# Patient Record
Sex: MALE | Race: WHITE | NOT HISPANIC OR LATINO | Employment: OTHER | URBAN - METROPOLITAN AREA
[De-identification: names, ages, dates, MRNs, and addresses within clinical notes are randomized per-mention and may not be internally consistent; named-entity substitution may affect disease eponyms.]

---

## 2017-12-08 ENCOUNTER — GENERIC CONVERSION - ENCOUNTER (OUTPATIENT)
Dept: OTHER | Facility: OTHER | Age: 82
End: 2017-12-08

## 2017-12-08 ENCOUNTER — APPOINTMENT (OUTPATIENT)
Dept: RADIOLOGY | Facility: MEDICAL CENTER | Age: 82
End: 2017-12-08
Payer: COMMERCIAL

## 2017-12-08 DIAGNOSIS — M17.12 PRIMARY OSTEOARTHRITIS OF LEFT KNEE: ICD-10-CM

## 2017-12-08 PROCEDURE — 73562 X-RAY EXAM OF KNEE 3: CPT

## 2018-01-24 VITALS
HEART RATE: 97 BPM | WEIGHT: 285 LBS | DIASTOLIC BLOOD PRESSURE: 84 MMHG | BODY MASS INDEX: 40.8 KG/M2 | HEIGHT: 70 IN | SYSTOLIC BLOOD PRESSURE: 155 MMHG

## 2018-05-04 ENCOUNTER — OFFICE VISIT (OUTPATIENT)
Dept: OBGYN CLINIC | Facility: MEDICAL CENTER | Age: 83
End: 2018-05-04
Payer: COMMERCIAL

## 2018-05-04 VITALS
DIASTOLIC BLOOD PRESSURE: 83 MMHG | WEIGHT: 284.39 LBS | HEIGHT: 69 IN | HEART RATE: 73 BPM | SYSTOLIC BLOOD PRESSURE: 155 MMHG | BODY MASS INDEX: 42.12 KG/M2

## 2018-05-04 DIAGNOSIS — G89.29 CHRONIC PAIN OF LEFT KNEE: ICD-10-CM

## 2018-05-04 DIAGNOSIS — M17.12 PRIMARY OSTEOARTHRITIS OF LEFT KNEE: Primary | ICD-10-CM

## 2018-05-04 DIAGNOSIS — M25.562 CHRONIC PAIN OF LEFT KNEE: ICD-10-CM

## 2018-05-04 PROCEDURE — 99213 OFFICE O/P EST LOW 20 MIN: CPT | Performed by: ORTHOPAEDIC SURGERY

## 2018-05-04 RX ORDER — ACETAMINOPHEN 325 MG/1
975 TABLET ORAL ONCE
Status: CANCELLED | OUTPATIENT
Start: 2018-05-04 | End: 2018-05-04

## 2018-05-04 RX ORDER — LISINOPRIL AND HYDROCHLOROTHIAZIDE 20; 12.5 MG/1; MG/1
TABLET ORAL
COMMUNITY
Start: 2018-03-23 | End: 2018-05-17

## 2018-05-04 RX ORDER — GABAPENTIN 300 MG/1
300 CAPSULE ORAL ONCE
Status: CANCELLED | OUTPATIENT
Start: 2018-05-04 | End: 2018-05-04

## 2018-05-04 RX ORDER — ASPIRIN 81 MG/1
TABLET, CHEWABLE ORAL DAILY
COMMUNITY

## 2018-05-04 RX ORDER — AMLODIPINE BESYLATE 5 MG/1
5 TABLET ORAL DAILY
COMMUNITY
End: 2018-05-17

## 2018-05-04 NOTE — PROGRESS NOTES
Assessment/Plan:    No problem-specific Assessment & Plan notes found for this encounter  Diagnoses and all orders for this visit:    Primary osteoarthritis of left knee    Chronic pain of left knee    Other orders  -     aspirin 81 mg chewable tablet; Chew  -     amLODIPine (NORVASC) 5 mg tablet; Take by mouth  -     lisinopril-hydrochlorothiazide (PRINZIDE,ZESTORETIC) 20-12 5 MG per tablet;         Plan:    He patient is an 59-year-old male that presents today with left knee pain when walking for a significant distance  He previously had an injection into the left knee which were provided some relief  He states he continues to have pain walking long distances  It was discussed at length the different treatment options and he would like to proceed with a left total knee arthroplasty  Risks and benefits to surgery were discussed with the patient that include but not limited to infection, increased pain, need for revision surgery, damage to blood vessels, damage to nerves, anesthesia risks  The patient understands risks and wishes to proceed all questions were answered to his satisfaction will get this scheduled for him at his  Convenience  Subjective:      Patient ID: Courtney Kunz is a [de-identified] y o  male  HPI  Patient is a 29-year-old male that presents to discuss total knee arthroplasty  He has tried cortisone injections with little relief  He states it affects his activities of daily living to a significant degree      The following portions of the patient's history were reviewed and updated as appropriate: allergies, current medications, past family history, past medical history, past social history, past surgical history and problem list     Review of Systems    ROS:   General: no fever, no chills  HEENT:  No loss of hearing or eyesight problems  Eyes:  No red eyes  Respiratory:  No coughing, shortness of breath or wheezing  Cardiovascular:  No chest pain, no palpitations  GI:  Abdomen soft nontender, denies nausea  Endocrine:  No muscle weakness, no frequent urination, no excessive thirst  Urinary:  No dysuria, no incontinence  Musculoskeletal: see HPI and PE  SKIN:  No skin rash, no dry skin  Neurological:  No headaches, no confusion  Psychiatric:  No suicide thoughts, no anxiety, no depression  Review of all other systems is negative  Objective:      /83   Pulse 73   Ht 5' 9" (1 753 m)   Wt 129 kg (284 lb 6 3 oz)   BMI 42 00 kg/m²          Physical Exam      Left knee examination shows skin intact with no evidence of erythema or ecchymosis noted  Range of motion is 0-115 degrees with mild effusion  tenderness noted with palpation over the medial joint line and over anterior knee  Knee is ligamentous stable to varus and valgus stress  Negative Lachman  Negative anterior and posterior drawer  Sensation intact to light touch L1-S1 distributions  5/5 strength with knee flexion extension, ankle flexion extension and flexion extension of all toes

## 2018-05-17 ENCOUNTER — HOSPITAL ENCOUNTER (OUTPATIENT)
Dept: RADIOLOGY | Facility: HOSPITAL | Age: 83
Discharge: HOME | End: 2018-05-17

## 2018-05-17 ENCOUNTER — OFFICE VISIT (OUTPATIENT)
Dept: LAB | Facility: HOSPITAL | Age: 83
End: 2018-05-17
Attending: ORTHOPAEDIC SURGERY
Payer: COMMERCIAL

## 2018-05-17 DIAGNOSIS — M17.12 PRIMARY OSTEOARTHRITIS OF LEFT KNEE: ICD-10-CM

## 2018-05-17 DIAGNOSIS — M25.562 CHRONIC PAIN OF LEFT KNEE: ICD-10-CM

## 2018-05-17 DIAGNOSIS — G89.29 CHRONIC PAIN OF LEFT KNEE: ICD-10-CM

## 2018-05-17 LAB
ABO GROUP BLD: NORMAL
ALBUMIN SERPL BCP-MCNC: 4.3 G/DL (ref 3.5–5)
ALP SERPL-CCNC: 74 U/L (ref 46–116)
ALT SERPL W P-5'-P-CCNC: 33 U/L (ref 12–78)
ANION GAP SERPL CALCULATED.3IONS-SCNC: 7 MMOL/L (ref 4–13)
APTT PPP: 28 SECONDS (ref 24–36)
AST SERPL W P-5'-P-CCNC: 21 U/L (ref 5–45)
ATRIAL RATE: 83 BPM
BASOPHILS # BLD AUTO: 0.04 THOUSANDS/ΜL (ref 0–0.1)
BASOPHILS NFR BLD AUTO: 1 % (ref 0–1)
BILIRUB SERPL-MCNC: 1.04 MG/DL (ref 0.2–1)
BLD GP AB SCN SERPL QL: NEGATIVE
BUN SERPL-MCNC: 22 MG/DL (ref 5–25)
CALCIUM SERPL-MCNC: 9.5 MG/DL (ref 8.3–10.1)
CHLORIDE SERPL-SCNC: 103 MMOL/L (ref 100–108)
CO2 SERPL-SCNC: 29 MMOL/L (ref 21–32)
CREAT SERPL-MCNC: 1.28 MG/DL (ref 0.6–1.3)
EOSINOPHIL # BLD AUTO: 0.23 THOUSAND/ΜL (ref 0–0.61)
EOSINOPHIL NFR BLD AUTO: 4 % (ref 0–6)
ERYTHROCYTE [DISTWIDTH] IN BLOOD BY AUTOMATED COUNT: 12.8 % (ref 11.6–15.1)
EST. AVERAGE GLUCOSE BLD GHB EST-MCNC: 123 MG/DL
GFR SERPL CREATININE-BSD FRML MDRD: 53 ML/MIN/1.73SQ M
GLUCOSE SERPL-MCNC: 92 MG/DL (ref 65–140)
HBA1C MFR BLD: 5.9 % (ref 4.2–6.3)
HCT VFR BLD AUTO: 46.6 % (ref 36.5–49.3)
HGB BLD-MCNC: 15.4 G/DL (ref 12–17)
IMM GRANULOCYTES # BLD AUTO: 0.01 THOUSAND/UL (ref 0–0.2)
IMM GRANULOCYTES NFR BLD AUTO: 0 % (ref 0–2)
INR PPP: 0.94 (ref 0.86–1.09)
LYMPHOCYTES # BLD AUTO: 1.74 THOUSANDS/ΜL (ref 0.6–4.47)
LYMPHOCYTES NFR BLD AUTO: 30 % (ref 14–44)
MCH RBC QN AUTO: 33 PG (ref 26.8–34.3)
MCHC RBC AUTO-ENTMCNC: 33 G/DL (ref 31.4–37.4)
MCV RBC AUTO: 100 FL (ref 82–98)
MONOCYTES # BLD AUTO: 0.34 THOUSAND/ΜL (ref 0.17–1.22)
MONOCYTES NFR BLD AUTO: 6 % (ref 4–12)
NEUTROPHILS # BLD AUTO: 3.48 THOUSANDS/ΜL (ref 1.85–7.62)
NEUTS SEG NFR BLD AUTO: 60 % (ref 43–75)
NRBC BLD AUTO-RTO: 0 /100 WBCS
P AXIS: 95 DEGREES
PLATELET # BLD AUTO: 198 THOUSANDS/UL (ref 149–390)
PMV BLD AUTO: 9.8 FL (ref 8.9–12.7)
POTASSIUM SERPL-SCNC: 4.5 MMOL/L (ref 3.5–5.3)
PR INTERVAL: 196 MS
PROT SERPL-MCNC: 7.8 G/DL (ref 6.4–8.2)
PROTHROMBIN TIME: 12.7 SECONDS (ref 11.8–14.2)
QRS AXIS: -24 DEGREES
QRSD INTERVAL: 90 MS
QT INTERVAL: 394 MS
QTC INTERVAL: 462 MS
RBC # BLD AUTO: 4.67 MILLION/UL (ref 3.88–5.62)
RH BLD: POSITIVE
SODIUM SERPL-SCNC: 139 MMOL/L (ref 136–145)
SPECIMEN EXPIRATION DATE: NORMAL
T WAVE AXIS: 34 DEGREES
VENTRICULAR RATE: 83 BPM
WBC # BLD AUTO: 5.84 THOUSAND/UL (ref 4.31–10.16)

## 2018-05-17 PROCEDURE — 71046 X-RAY EXAM CHEST 2 VIEWS: CPT

## 2018-05-17 PROCEDURE — 36415 COLL VENOUS BLD VENIPUNCTURE: CPT

## 2018-05-17 PROCEDURE — 85610 PROTHROMBIN TIME: CPT

## 2018-05-17 PROCEDURE — 86901 BLOOD TYPING SEROLOGIC RH(D): CPT | Performed by: ORTHOPAEDIC SURGERY

## 2018-05-17 PROCEDURE — 85730 THROMBOPLASTIN TIME PARTIAL: CPT

## 2018-05-17 PROCEDURE — 93005 ELECTROCARDIOGRAM TRACING: CPT

## 2018-05-17 PROCEDURE — 86900 BLOOD TYPING SEROLOGIC ABO: CPT | Performed by: ORTHOPAEDIC SURGERY

## 2018-05-17 PROCEDURE — 80053 COMPREHEN METABOLIC PANEL: CPT

## 2018-05-17 PROCEDURE — 93010 ELECTROCARDIOGRAM REPORT: CPT | Performed by: INTERNAL MEDICINE

## 2018-05-17 PROCEDURE — 86850 RBC ANTIBODY SCREEN: CPT | Performed by: ORTHOPAEDIC SURGERY

## 2018-05-17 PROCEDURE — 83036 HEMOGLOBIN GLYCOSYLATED A1C: CPT

## 2018-05-17 PROCEDURE — 85025 COMPLETE CBC W/AUTO DIFF WBC: CPT

## 2018-05-17 RX ORDER — ASCORBIC ACID 500 MG
500 TABLET ORAL DAILY
COMMUNITY
End: 2018-06-16 | Stop reason: HOSPADM

## 2018-05-17 RX ORDER — FERROUS SULFATE 325(65) MG
325 TABLET ORAL
COMMUNITY
End: 2018-06-16 | Stop reason: HOSPADM

## 2018-05-17 RX ORDER — LISINOPRIL AND HYDROCHLOROTHIAZIDE 25; 20 MG/1; MG/1
1 TABLET ORAL DAILY
COMMUNITY

## 2018-05-17 RX ORDER — FOLIC ACID 1 MG/1
TABLET ORAL DAILY
COMMUNITY
End: 2018-06-16 | Stop reason: HOSPADM

## 2018-05-17 NOTE — PRE-PROCEDURE INSTRUCTIONS
Pre-Surgery Instructions:   Medication Instructions    ascorbic acid (VITAMIN C) 500 mg tablet Patient was instructed by Physician and understands   aspirin 81 mg chewable tablet Instructed patient per Anesthesia Guidelines   Bimatoprost (LUMIGAN OP) Instructed patient per Anesthesia Guidelines   ferrous sulfate 325 (65 Fe) mg tablet Patient was instructed by Physician and understands   folic acid (FOLVITE) 1 mg tablet Patient was instructed by Physician and understands   lisinopril-hydrochlorothiazide (PRINZIDE,ZESTORETIC) 20-25 MG per tablet Instructed patient per Anesthesia Guidelines  Patient had PAT appt, wife present  Medication list reviewed & instructed  As of 6 7 18 pt to stop aspirin  Instructed on tylenol only  Last dose of lisinopril-hctz 6 12 18  Pt has script & instructed to start vit C, iron, folic acid week of 4 30 81 and continue taking until 6 13 18  Am DOS no medications  Showering instructions & cloths given by office, reviewed at time of call  Soap and incentive given to patient  All instructed and verbally understood by patient and patient wife  Callback number given  ACE/ARB Med Class     Do not take this medication the day before and the morning of the day of surgery/procedure  ASA Med Class: Aspirin     Should be discontinued at least one week prior to planned operation, unless specifically stated otherwise by surgical service  Your Surgeon may have patient stop taking aspirin up to a week before surgery if having intracranial, middle ear, posterior eye, spine surgery or prostate surgery  [Patients taking aspirin for coronary stents should be reviewed by an anesthesiologist in the optimization clinic  Please do not discontinue aspirin in patients with coronary stents unless given specific permission to do so by the cardiologist who prescribed medication ]   If your surgeon approves please continue to take this medication on your normal schedule  You may take this medication on the morning of your surgery with a sip of water

## 2018-05-24 ENCOUNTER — TRANSCRIBE ORDERS (OUTPATIENT)
Dept: ADMINISTRATIVE | Facility: HOSPITAL | Age: 83
End: 2018-05-24

## 2018-05-24 DIAGNOSIS — M67.951 UNSPECIFIED DISORDER OF SYNOVIUM AND TENDON, RIGHT THIGH: ICD-10-CM

## 2018-05-24 DIAGNOSIS — M70.41 PREPATELLAR BURSITIS OF RIGHT KNEE: Primary | ICD-10-CM

## 2018-05-30 ENCOUNTER — ANESTHESIA EVENT (OUTPATIENT)
Dept: PERIOP | Facility: HOSPITAL | Age: 83
DRG: 470 | End: 2018-05-30
Payer: COMMERCIAL

## 2018-05-31 ENCOUNTER — HOSPITAL ENCOUNTER (OUTPATIENT)
Dept: RADIOLOGY | Facility: HOSPITAL | Age: 83
Discharge: HOME | End: 2018-05-31
Payer: COMMERCIAL

## 2018-05-31 DIAGNOSIS — M70.41 PREPATELLAR BURSITIS OF RIGHT KNEE: ICD-10-CM

## 2018-05-31 DIAGNOSIS — M67.951 UNSPECIFIED DISORDER OF SYNOVIUM AND TENDON, RIGHT THIGH: ICD-10-CM

## 2018-05-31 PROCEDURE — 73721 MRI JNT OF LWR EXTRE W/O DYE: CPT

## 2018-06-06 ENCOUNTER — TELEPHONE (OUTPATIENT)
Dept: OBGYN CLINIC | Facility: HOSPITAL | Age: 83
End: 2018-06-06

## 2018-06-06 NOTE — TELEPHONE ENCOUNTER
Call placed to do pre-op elective admission assessment, spoke with pt  Pt reporting he lives in a one story home with his wife, Isidro Bowens  Pt has 2 steps to get into the home and enters to the first and only level  He will stay on the first level  Pt has a walker and a cane, he does not have a bedside commode  Pt is currently ambulating independently of assistive devices and is independent with ADLs  Pt has a walk-in shower  Pt uses [address]  He reports he manages his medications with the assistance of his wife- they use a pillbox  Pt reporting he has the RX for the post-op lovenox, I advised him to fill it PTA and pt agreeable to do so  Pt planning to go home when [facility] and plans to go to outpt PT at [facility] does have a $40 co-pay with outpt PT visit and has a limited number of PT visits (Per surgery scheduler Michael Juarez)  Pt educated on early mobilization (POD0), indication for/use of incentive spirometer (10x/hour while awake) and indication for/use of foot/leg pumps (18 hours/day)  RAPT score 9  Pt enrolled in caresense  Pt denies having questions/concerns at this time

## 2018-06-08 ENCOUNTER — OFFICE VISIT (OUTPATIENT)
Dept: OBGYN CLINIC | Facility: MEDICAL CENTER | Age: 83
End: 2018-06-08
Payer: COMMERCIAL

## 2018-06-08 VITALS — HEIGHT: 69 IN | WEIGHT: 284.39 LBS | BODY MASS INDEX: 42.12 KG/M2

## 2018-06-08 DIAGNOSIS — M25.561 ACUTE PAIN OF RIGHT KNEE: Primary | ICD-10-CM

## 2018-06-08 DIAGNOSIS — M70.41 PREPATELLAR BURSITIS OF RIGHT KNEE: ICD-10-CM

## 2018-06-08 PROCEDURE — 20610 DRAIN/INJ JOINT/BURSA W/O US: CPT | Performed by: ORTHOPAEDIC SURGERY

## 2018-06-08 PROCEDURE — 99213 OFFICE O/P EST LOW 20 MIN: CPT | Performed by: ORTHOPAEDIC SURGERY

## 2018-06-08 NOTE — PROGRESS NOTES
[de-identified] y o male who is scheduled to undergo left total knee replacement this upcoming week, presents for evaluation of painful swelling in the anterior aspect of his right knee  Been present for few weeks, gradually getting worse  Occurs with some trauma to the direct anterior aspect of his right knee, occurs without systemic symptoms such as fevers or chills  Review of Systems  Review of systems negative unless otherwise specified in HPI    Past Medical History  Past Medical History:   Diagnosis Date    Hypertension     Sleep apnea     no CPAP        Past Surgical History  Past Surgical History:   Procedure Laterality Date    CARPAL TUNNEL RELEASE         Current Medications  Current Outpatient Prescriptions on File Prior to Visit   Medication Sig Dispense Refill    ascorbic acid (VITAMIN C) 500 mg tablet Take 500 mg by mouth daily      aspirin 81 mg chewable tablet Chew      Bimatoprost (LUMIGAN OP) Apply to eye      ferrous sulfate 325 (65 Fe) mg tablet Take 325 mg by mouth daily with breakfast      folic acid (FOLVITE) 1 mg tablet Take by mouth daily      lisinopril-hydrochlorothiazide (PRINZIDE,ZESTORETIC) 20-25 MG per tablet Take 1 tablet by mouth daily       No current facility-administered medications on file prior to visit  Recent Labs [facility])    0  Lab Value Date/Time   HCT 46 6 05/17/2018 1347   HGB 15 4 05/17/2018 1347   WBC 5 84 05/17/2018 1347   INR 0 94 05/17/2018 1347   GLUCOSE 92 05/17/2018 1347   HGBA1C 5 9 05/17/2018 1347         Physical exam  · General: Awake, Alert, Oriented  · Eyes: Pupils equal, round and reactive to light  · Heart: regular rate and rhythm  · Lungs: No audible wheezing  · Abdomen: soft  Gait pattern is antalgic  Right hip moves well  Right thighs devoid of atrophy right knee is in varus  There is no effusion  There is bony enlargement and tenderness medially  There is a soft tissue collection anterior to the right patella    The skin is not michelle in deep knee flexion  There is no erythema, there is no fluctuance  Calf compartments are soft and supple  Toes are warm, sensate, mobile  Imaging  No new x-rays accompany this patient    Procedure  An aspiration of the right knee prepatellar bursa was performed sterilely  It is documented below    Large joint arthrocentesis  Date/Time: 6/8/2018 11:38 AM  Consent given by: patient  Supporting Documentation  Indications: pain   Procedure Details  Location: knee - R knee    Aspirate amount: 30 mL  Patient tolerance: patient tolerated the procedure well with no immediate complications  Dressing:  Sterile dressing applied          Assessment/Plan:   82 y o male who has prepatellar bursitis  Aspiration of the hemorrhagic bursa is performed a bandages applied    I would welcome the opportunity see this patient back in the office next week, when he schedule undergo left total knee replacement surgery

## 2018-06-14 ENCOUNTER — ANESTHESIA (OUTPATIENT)
Dept: PERIOP | Facility: HOSPITAL | Age: 83
DRG: 470 | End: 2018-06-14
Payer: COMMERCIAL

## 2018-06-14 ENCOUNTER — HOSPITAL ENCOUNTER (INPATIENT)
Facility: HOSPITAL | Age: 83
LOS: 2 days | Discharge: HOME | DRG: 470 | End: 2018-06-16
Attending: ORTHOPAEDIC SURGERY | Admitting: ORTHOPAEDIC SURGERY
Payer: COMMERCIAL

## 2018-06-14 DIAGNOSIS — I15.9 SECONDARY HYPERTENSION: Primary | ICD-10-CM

## 2018-06-14 DIAGNOSIS — M17.12 PRIMARY OSTEOARTHRITIS OF LEFT KNEE: ICD-10-CM

## 2018-06-14 LAB
ABO GROUP BLD: NORMAL
BLD GP AB SCN SERPL QL: NEGATIVE
RH BLD: POSITIVE
SPECIMEN EXPIRATION DATE: NORMAL

## 2018-06-14 PROCEDURE — 86850 RBC ANTIBODY SCREEN: CPT | Performed by: ORTHOPAEDIC SURGERY

## 2018-06-14 PROCEDURE — C1776 JOINT DEVICE (IMPLANTABLE): HCPCS | Performed by: ORTHOPAEDIC SURGERY

## 2018-06-14 PROCEDURE — 86900 BLOOD TYPING SEROLOGIC ABO: CPT | Performed by: ORTHOPAEDIC SURGERY

## 2018-06-14 PROCEDURE — 27447 TOTAL KNEE ARTHROPLASTY: CPT | Performed by: ORTHOPAEDIC SURGERY

## 2018-06-14 PROCEDURE — G8979 MOBILITY GOAL STATUS: HCPCS

## 2018-06-14 PROCEDURE — 0S9C3ZZ DRAINAGE OF RIGHT KNEE JOINT, PERCUTANEOUS APPROACH: ICD-10-PCS | Performed by: ORTHOPAEDIC SURGERY

## 2018-06-14 PROCEDURE — G8978 MOBILITY CURRENT STATUS: HCPCS

## 2018-06-14 PROCEDURE — C1713 ANCHOR/SCREW BN/BN,TIS/BN: HCPCS | Performed by: ORTHOPAEDIC SURGERY

## 2018-06-14 PROCEDURE — 97163 PT EVAL HIGH COMPLEX 45 MIN: CPT

## 2018-06-14 PROCEDURE — 20610 DRAIN/INJ JOINT/BURSA W/O US: CPT | Performed by: ORTHOPAEDIC SURGERY

## 2018-06-14 PROCEDURE — 86901 BLOOD TYPING SEROLOGIC RH(D): CPT | Performed by: ORTHOPAEDIC SURGERY

## 2018-06-14 PROCEDURE — 0SRD0J9 REPLACEMENT OF LEFT KNEE JOINT WITH SYNTHETIC SUBSTITUTE, CEMENTED, OPEN APPROACH: ICD-10-PCS | Performed by: ORTHOPAEDIC SURGERY

## 2018-06-14 DEVICE — SMARTSET HV HIGH VISCOSITY BONE CEMENT 40G
Type: IMPLANTABLE DEVICE | Site: KNEE | Status: FUNCTIONAL
Brand: SMARTSET

## 2018-06-14 DEVICE — ATTUNE PATELLA MEDIALIZED DOME 41MM CEMENTED AOX
Type: IMPLANTABLE DEVICE | Site: KNEE | Status: FUNCTIONAL
Brand: ATTUNE

## 2018-06-14 DEVICE — ATTUNE KNEE SYSTEM TIBIAL BASE FIXED BEARING SIZE 8 CEMENTED
Type: IMPLANTABLE DEVICE | Site: KNEE | Status: FUNCTIONAL
Brand: ATTUNE

## 2018-06-14 DEVICE — ATTUNE KNEE SYSTEM TIBIAL INSERT FIXED BEARING POSTERIOR STABILIZED 9 7MM AOX
Type: IMPLANTABLE DEVICE | Site: KNEE | Status: FUNCTIONAL
Brand: ATTUNE

## 2018-06-14 DEVICE — ATTUNE KNEE SYSTEM FEMORAL POSTERIOR STABILIZED SIZE 9 LEFT CEMENTED
Type: IMPLANTABLE DEVICE | Site: KNEE | Status: FUNCTIONAL
Brand: ATTUNE

## 2018-06-14 RX ORDER — MIDAZOLAM HYDROCHLORIDE 1 MG/ML
INJECTION INTRAMUSCULAR; INTRAVENOUS
Status: DISPENSED
Start: 2018-06-14 | End: 2018-06-14

## 2018-06-14 RX ORDER — FOLIC ACID 1 MG/1
1 TABLET ORAL DAILY
Status: DISCONTINUED | OUTPATIENT
Start: 2018-06-14 | End: 2018-06-16 | Stop reason: HOSPADM

## 2018-06-14 RX ORDER — SODIUM CHLORIDE, SODIUM LACTATE, POTASSIUM CHLORIDE, CALCIUM CHLORIDE 600; 310; 30; 20 MG/100ML; MG/100ML; MG/100ML; MG/100ML
INJECTION, SOLUTION INTRAVENOUS CONTINUOUS PRN
Status: DISCONTINUED | OUTPATIENT
Start: 2018-06-14 | End: 2018-06-14 | Stop reason: SURG

## 2018-06-14 RX ORDER — ACETAMINOPHEN 325 MG/1
650 TABLET ORAL EVERY 6 HOURS PRN
Status: DISCONTINUED | OUTPATIENT
Start: 2018-06-14 | End: 2018-06-16 | Stop reason: HOSPADM

## 2018-06-14 RX ORDER — OXYCODONE HYDROCHLORIDE 5 MG/1
TABLET ORAL
Qty: 30 TABLET | Refills: 0 | Status: SHIPPED | OUTPATIENT
Start: 2018-06-14 | End: 2018-07-27

## 2018-06-14 RX ORDER — SODIUM CHLORIDE, SODIUM LACTATE, POTASSIUM CHLORIDE, CALCIUM CHLORIDE 600; 310; 30; 20 MG/100ML; MG/100ML; MG/100ML; MG/100ML
100 INJECTION, SOLUTION INTRAVENOUS CONTINUOUS
Status: ACTIVE | OUTPATIENT
Start: 2018-06-14 | End: 2018-06-14

## 2018-06-14 RX ORDER — PROPOFOL 10 MG/ML
INJECTION, EMULSION INTRAVENOUS AS NEEDED
Status: DISCONTINUED | OUTPATIENT
Start: 2018-06-14 | End: 2018-06-14 | Stop reason: SURG

## 2018-06-14 RX ORDER — TRAMADOL HYDROCHLORIDE 50 MG/1
50 TABLET ORAL EVERY 6 HOURS SCHEDULED
Status: DISCONTINUED | OUTPATIENT
Start: 2018-06-14 | End: 2018-06-16 | Stop reason: HOSPADM

## 2018-06-14 RX ORDER — MAGNESIUM HYDROXIDE 1200 MG/15ML
LIQUID ORAL AS NEEDED
Status: DISCONTINUED | OUTPATIENT
Start: 2018-06-14 | End: 2018-06-14 | Stop reason: HOSPADM

## 2018-06-14 RX ORDER — ACETAMINOPHEN 325 MG/1
975 TABLET ORAL ONCE
Status: COMPLETED | OUTPATIENT
Start: 2018-06-14 | End: 2018-06-14

## 2018-06-14 RX ORDER — ONDANSETRON 2 MG/ML
INJECTION INTRAMUSCULAR; INTRAVENOUS AS NEEDED
Status: DISCONTINUED | OUTPATIENT
Start: 2018-06-14 | End: 2018-06-14 | Stop reason: SURG

## 2018-06-14 RX ORDER — ONDANSETRON 2 MG/ML
4 INJECTION INTRAMUSCULAR; INTRAVENOUS ONCE AS NEEDED
Status: DISCONTINUED | OUTPATIENT
Start: 2018-06-14 | End: 2018-06-14 | Stop reason: HOSPADM

## 2018-06-14 RX ORDER — FENTANYL CITRATE/PF 50 MCG/ML
50 SYRINGE (ML) INJECTION
Status: DISCONTINUED | OUTPATIENT
Start: 2018-06-14 | End: 2018-06-14 | Stop reason: HOSPADM

## 2018-06-14 RX ORDER — ROPIVACAINE HYDROCHLORIDE 5 MG/ML
INJECTION, SOLUTION EPIDURAL; INFILTRATION; PERINEURAL AS NEEDED
Status: DISCONTINUED | OUTPATIENT
Start: 2018-06-14 | End: 2018-06-14 | Stop reason: SURG

## 2018-06-14 RX ORDER — ONDANSETRON 2 MG/ML
4 INJECTION INTRAMUSCULAR; INTRAVENOUS EVERY 4 HOURS PRN
Status: DISCONTINUED | OUTPATIENT
Start: 2018-06-14 | End: 2018-06-16 | Stop reason: HOSPADM

## 2018-06-14 RX ORDER — SODIUM CHLORIDE, SODIUM LACTATE, POTASSIUM CHLORIDE, CALCIUM CHLORIDE 600; 310; 30; 20 MG/100ML; MG/100ML; MG/100ML; MG/100ML
1.5 INJECTION, SOLUTION INTRAVENOUS CONTINUOUS
Status: DISCONTINUED | OUTPATIENT
Start: 2018-06-14 | End: 2018-06-16 | Stop reason: HOSPADM

## 2018-06-14 RX ORDER — ACETAMINOPHEN 325 MG/1
650 TABLET ORAL EVERY 6 HOURS PRN
Qty: 60 TABLET | Refills: 0 | Status: SHIPPED | OUTPATIENT
Start: 2018-06-14 | End: 2018-07-14

## 2018-06-14 RX ORDER — SUCCINYLCHOLINE CHLORIDE 20 MG/ML
INJECTION INTRAMUSCULAR; INTRAVENOUS AS NEEDED
Status: DISCONTINUED | OUTPATIENT
Start: 2018-06-14 | End: 2018-06-14 | Stop reason: SURG

## 2018-06-14 RX ORDER — ASCORBIC ACID 500 MG
500 TABLET ORAL DAILY
Status: DISCONTINUED | OUTPATIENT
Start: 2018-06-14 | End: 2018-06-16 | Stop reason: HOSPADM

## 2018-06-14 RX ORDER — HYDRALAZINE HYDROCHLORIDE 25 MG/1
25 TABLET, FILM COATED ORAL EVERY 8 HOURS PRN
Status: DISCONTINUED | OUTPATIENT
Start: 2018-06-14 | End: 2018-06-16 | Stop reason: HOSPADM

## 2018-06-14 RX ORDER — DOCUSATE SODIUM 100 MG/1
100 CAPSULE, LIQUID FILLED ORAL 2 TIMES DAILY
Status: DISCONTINUED | OUTPATIENT
Start: 2018-06-14 | End: 2018-06-16 | Stop reason: HOSPADM

## 2018-06-14 RX ORDER — OXYCODONE HYDROCHLORIDE 5 MG/1
5 TABLET ORAL EVERY 4 HOURS PRN
Status: DISCONTINUED | OUTPATIENT
Start: 2018-06-14 | End: 2018-06-16 | Stop reason: HOSPADM

## 2018-06-14 RX ORDER — GABAPENTIN 300 MG/1
300 CAPSULE ORAL ONCE
Status: COMPLETED | OUTPATIENT
Start: 2018-06-14 | End: 2018-06-14

## 2018-06-14 RX ORDER — FERROUS SULFATE 325(65) MG
325 TABLET ORAL
Status: DISCONTINUED | OUTPATIENT
Start: 2018-06-15 | End: 2018-06-16 | Stop reason: HOSPADM

## 2018-06-14 RX ORDER — FENTANYL CITRATE 50 UG/ML
INJECTION, SOLUTION INTRAMUSCULAR; INTRAVENOUS AS NEEDED
Status: DISCONTINUED | OUTPATIENT
Start: 2018-06-14 | End: 2018-06-14 | Stop reason: SURG

## 2018-06-14 RX ORDER — SENNOSIDES 8.6 MG
1 TABLET ORAL DAILY
Status: DISCONTINUED | OUTPATIENT
Start: 2018-06-14 | End: 2018-06-16 | Stop reason: HOSPADM

## 2018-06-14 RX ORDER — ASPIRIN 81 MG/1
81 TABLET, CHEWABLE ORAL DAILY
Status: DISCONTINUED | OUTPATIENT
Start: 2018-06-14 | End: 2018-06-16 | Stop reason: HOSPADM

## 2018-06-14 RX ORDER — MIDAZOLAM HYDROCHLORIDE 2 MG/ML
SYRUP ORAL AS NEEDED
Status: DISCONTINUED | OUTPATIENT
Start: 2018-06-14 | End: 2018-06-14 | Stop reason: SURG

## 2018-06-14 RX ORDER — OXYCODONE HYDROCHLORIDE 10 MG/1
10 TABLET ORAL EVERY 4 HOURS PRN
Status: DISCONTINUED | OUTPATIENT
Start: 2018-06-14 | End: 2018-06-16 | Stop reason: HOSPADM

## 2018-06-14 RX ORDER — TRAMADOL HYDROCHLORIDE 50 MG/1
50 TABLET ORAL EVERY 6 HOURS PRN
Qty: 30 TABLET | Refills: 0 | Status: SHIPPED | OUTPATIENT
Start: 2018-06-14 | End: 2018-06-24

## 2018-06-14 RX ADMIN — DOCUSATE SODIUM 100 MG: 100 CAPSULE, LIQUID FILLED ORAL at 17:48

## 2018-06-14 RX ADMIN — ROPIVACAINE HYDROCHLORIDE 15 ML: 5 INJECTION, SOLUTION EPIDURAL; INFILTRATION; PERINEURAL at 08:50

## 2018-06-14 RX ADMIN — MIDAZOLAM HYDROCHLORIDE 2 MG: 2 SYRUP ORAL at 08:45

## 2018-06-14 RX ADMIN — DEXAMETHASONE SODIUM PHOSPHATE 10 MG: 10 INJECTION INTRAMUSCULAR; INTRAVENOUS at 10:16

## 2018-06-14 RX ADMIN — FENTANYL CITRATE 50 MCG: 50 INJECTION, SOLUTION INTRAMUSCULAR; INTRAVENOUS at 10:09

## 2018-06-14 RX ADMIN — FENTANYL CITRATE 50 MCG: 50 INJECTION, SOLUTION INTRAMUSCULAR; INTRAVENOUS at 10:58

## 2018-06-14 RX ADMIN — ACETAMINOPHEN 975 MG: 325 TABLET, FILM COATED ORAL at 07:46

## 2018-06-14 RX ADMIN — ONDANSETRON 4 MG: 2 INJECTION INTRAMUSCULAR; INTRAVENOUS at 10:55

## 2018-06-14 RX ADMIN — SODIUM CHLORIDE, SODIUM LACTATE, POTASSIUM CHLORIDE, AND CALCIUM CHLORIDE: .6; .31; .03; .02 INJECTION, SOLUTION INTRAVENOUS at 10:55

## 2018-06-14 RX ADMIN — OXYCODONE HYDROCHLORIDE AND ACETAMINOPHEN 500 MG: 500 TABLET ORAL at 13:28

## 2018-06-14 RX ADMIN — SUCCINYLCHOLINE CHLORIDE 160 MG: 20 INJECTION, SOLUTION INTRAMUSCULAR; INTRAVENOUS at 09:55

## 2018-06-14 RX ADMIN — FENTANYL CITRATE 50 MCG: 50 INJECTION, SOLUTION INTRAMUSCULAR; INTRAVENOUS at 11:29

## 2018-06-14 RX ADMIN — GABAPENTIN 300 MG: 300 CAPSULE ORAL at 07:45

## 2018-06-14 RX ADMIN — SENNOSIDES 8.6 MG: 8.6 TABLET, FILM COATED ORAL at 13:26

## 2018-06-14 RX ADMIN — FOLIC ACID 1 MG: 1 TABLET ORAL at 13:28

## 2018-06-14 RX ADMIN — FENTANYL CITRATE 50 MCG: 50 INJECTION, SOLUTION INTRAMUSCULAR; INTRAVENOUS at 09:46

## 2018-06-14 RX ADMIN — PROPOFOL 200 MG: 10 INJECTION, EMULSION INTRAVENOUS at 09:54

## 2018-06-14 RX ADMIN — SODIUM CHLORIDE, SODIUM LACTATE, POTASSIUM CHLORIDE, AND CALCIUM CHLORIDE: .6; .31; .03; .02 INJECTION, SOLUTION INTRAVENOUS at 08:56

## 2018-06-14 RX ADMIN — ACETAMINOPHEN 650 MG: 325 TABLET, FILM COATED ORAL at 21:46

## 2018-06-14 RX ADMIN — TRAMADOL HYDROCHLORIDE 50 MG: 50 TABLET, FILM COATED ORAL at 13:26

## 2018-06-14 RX ADMIN — SODIUM CHLORIDE, SODIUM LACTATE, POTASSIUM CHLORIDE, AND CALCIUM CHLORIDE 1.5 ML/KG/HR: .6; .31; .03; .02 INJECTION, SOLUTION INTRAVENOUS at 12:24

## 2018-06-14 RX ADMIN — Medication 3000 MG: at 09:58

## 2018-06-14 RX ADMIN — ASPIRIN 81 MG 81 MG: 81 TABLET ORAL at 13:31

## 2018-06-14 RX ADMIN — BIMATOPROST 1 DROP: 0.1 SOLUTION/ DROPS OPHTHALMIC at 21:47

## 2018-06-14 RX ADMIN — CEFAZOLIN SODIUM 2000 MG: 10 INJECTION, POWDER, FOR SOLUTION INTRAVENOUS at 17:39

## 2018-06-14 RX ADMIN — TRAMADOL HYDROCHLORIDE 50 MG: 50 TABLET, FILM COATED ORAL at 18:47

## 2018-06-14 NOTE — CONSULTS
Consultation - Vidya Adams [de-identified] y o  male MRN: [id number]    Unit/Bed#: CW3 343-01 Encounter: [id number]        History of Present Illness     HPI: Vidya Adams is a [de-identified]y o  year old male who presents with severe osteoarthritis of the left knee that failed outpatient conservative treatments  He opted to undergo elective replacement with Dr Celeste Lester today without any difficulty  ROS:  Constitutional: Negative  HENT: Negative  Respiratory: Negative  Cardiovascular: Negative  Gastrointestinal: Negative  Musculoskeletal: + left knee pain  Neurological: Negative  Psychiatric/Behavioral: Negative  Historical Information   Past Medical History:   Diagnosis Date    Hypertension     Sleep apnea     no CPAP      Past Surgical History:   Procedure Laterality Date    CARPAL TUNNEL RELEASE       Social History   History   Alcohol Use No     History   Drug Use No     History   Smoking Status    Former Smoker   Smokeless Tobacco    Never Used     Comment: quit > 40 years ago      History reviewed  No pertinent family history      Meds/Allergies   current meds:  Current Facility-Administered Medications   Medication Dose Route Frequency    acetaminophen (TYLENOL) tablet 650 mg  650 mg Oral Q6H PRN    ascorbic acid (VITAMIN C) tablet 500 mg  500 mg Oral Daily    aspirin chewable tablet 81 mg  81 mg Oral Daily    bimatoprost (LUMIGAN) 0 01 % ophthalmic solution 1 drop  1 drop Both Eyes HS    ceFAZolin (ANCEF) 2,000 mg in sodium chloride 0 9 % 50 mL IVPB  2,000 mg Intravenous Q8H    docusate sodium (COLACE) capsule 100 mg  100 mg Oral BID    [START ON 6/15/2018] enoxaparin (LOVENOX) subcutaneous injection 40 mg  40 mg Subcutaneous Q24H [facility]    [START ON 6/15/2018] ferrous sulfate tablet 325 mg  325 mg Oral Daily With Breakfast    folic acid (FOLVITE) tablet 1 mg  1 mg Oral Daily    HYDROmorphone (DILAUDID) injection 1 mg  1 mg Intravenous Q4H PRN    lactated ringers infusion  100 mL/hr Intravenous Continuous    lactated ringers infusion  1 5 mL/kg/hr Intravenous Continuous    [START ON 6/15/2018] lisinopril-hydrochlorothiazide (PRINZIDE 20/25) combo dose   Oral Daily    midazolam (VERSED) 2 mg/2 mL injection **AcuDose Override Pull**        ondansetron (ZOFRAN) injection 4 mg  4 mg Intravenous Q4H PRN    oxyCODONE (ROXICODONE) immediate release tablet 10 mg  10 mg Oral Q4H PRN    oxyCODONE (ROXICODONE) IR tablet 5 mg  5 mg Oral Q4H PRN    senna (SENOKOT) tablet 8 6 mg  1 tablet Oral Daily    traMADol (ULTRAM) tablet 50 mg  50 mg Oral Q6H [address]       PTA meds:   Prescriptions Prior to Admission   Medication    ascorbic acid (VITAMIN C) 500 mg tablet    aspirin 81 mg chewable tablet    Bimatoprost (LUMIGAN OP)    ferrous sulfate 325 (65 Fe) mg tablet    folic acid (FOLVITE) 1 mg tablet    lisinopril-hydrochlorothiazide (PRINZIDE,ZESTORETIC) 20-25 MG per tablet     No Known Allergies    Objective   Vitals: Blood pressure 151/77, pulse 77, temperature 98 3 °F (36 8 °C), temperature source Oral, resp  rate 16, height 5' 9" (1 753 m), weight 129 kg (284 lb), SpO2 96 %  Physical Exam   Constitutional: Pt is oriented to person, place, and time  HENT:  Normocephalic  EOM are normal  PERRLAt  Neck supple  Cardiovascular: Normal rate and regular rhythm  Pulmonary: Breath sounds normal  No respiratory distress  Pt has no wheezes nor rales  Abdominal: Soft  Bowel sounds are normal  Pt exhibits no distension  There is no tenderness  There is no rebound and no guarding  Neurological: Pt is alert and oriented to person, place, and time  Psychiatric: Pt has a normal mood and affect  Musculoskeletal: + dressing intact to left knee      Lab Results:                   Glucose (mg/dL)   Date Value   05/17/2018 92       Labs reviewed    Imaging: reviewed  EKG, Pathology, and Other Studies: I have personally reviewed pertinent reports      VTE Prophylaxis: Enoxaparin (Lovenox)    Code Status: Level 1 - Full Code   Advance Directive and Living Will:      Power of :    POLST:      Assessment/Plan     # L TKR: cont pain  Management per ortho; monitor for constipation d/t narcotics for pain control; Lovenox for DVT prophylaxis    # HTN: due to risk of acute renal insufficiency, will hold lisinopril/hctz until bmp is obtained in the a m  In the meantime will use hydralazine as needed for SBP >140    # Anemia: CBC in a m , monitor for acute anemia; transfuse hgb <7 0    Counseling / Coordination of Care  Total floor / unit time spent today 45 minutes  Greater than 50% of total time was spent with the patient and / or family counseling and / or coordination of care

## 2018-06-14 NOTE — ANESTHESIA PROCEDURE NOTES
Peripheral Block    Start time: 6/14/2018 8:45 AM  Removal time: 6/14/2018 8:52 AM  Reason for block: procedure for pain and at surgeon's request  Staffing  Anesthesiologist: Aubrey Gonzalez  Performed: anesthesiologist   Preanesthetic Checklist  Completed: patient identified, site marked, surgical consent, pre-op evaluation, timeout performed, IV checked, risks and benefits discussed and monitors and equipment checked  Peripheral Block  Patient position: sitting  Prep: ChloraPrep  Patient monitoring: heart rate, continuous pulse ox and frequent blood pressure checks  Block type: adductor canal block  Laterality: left  Injection technique: single-shot  Ultrasound permanent image saved  Local infiltration: ropivacaine  Infiltration strength: 0 5 %  Dose: 15 mL  Needle  Needle type: [employer]   Needle gauge: 4 inch    Needle localization: ultrasound guidance  Assessment  Injection assessment: incremental injection, local visualized surrounding nerve on ultrasound, negative aspiration for heme and no paresthesia on injection  Heart rate change: no  Slow fractionated injection: yes  Post-procedure:  site cleaned  patient tolerated the procedure well with no immediate complications  Additional Notes  Neg heme aspiration q5cc injection

## 2018-06-14 NOTE — ANESTHESIA POSTPROCEDURE EVALUATION
Post-Op Assessment Note      CV Status:  Stable    Mental Status:  Alert and awake    Hydration Status:  Euvolemic    PONV Controlled:  Controlled    Airway Patency:  Patent    Post Op Vitals Reviewed: Yes          Staff: Anesthesiologist       Comments: uneventful post-op course - no pain when I spoke to him          BP      Temp      Pulse     Resp      SpO2

## 2018-06-14 NOTE — ANESTHESIA PREPROCEDURE EVALUATION
Review of Systems/Medical History      No history of anesthetic complications     Cardiovascular  Hypertension ,    Pulmonary  Sleep apnea ,        GI/Hepatic      Comment: Obesity, BMI ~ 42     Negative  ROS        Endo/Other  Negative endo/other ROS      GYN  Negative gynecology ROS          Hematology  Negative hematology ROS   No coagulation disorder , No thrombocytopenia,    Musculoskeletal    Arthritis     Neurology  Negative neurology ROS      Psychology   Negative psychology ROS              Physical Exam    Airway    Mallampati score: IV  TM Distance: >3 FB  Neck ROM: full     Dental       Cardiovascular      Pulmonary      Other Findings        Anesthesia Plan  ASA Score- 3     Anesthesia Type- spinal with ASA Monitors  Additional Monitors:   Airway Plan:     Comment: IV sedation, GA back up; standard ASA monitors  Risks and benefits discussed with patient; patient consented and agrees to proceed  I saw and evaluated the patient  If seen with CRNA, we have discussed the anesthetic plan and I am in agreement that the plan is appropriate for the patient  Spinal, risks discussed, including bleeding/infection/neurologiocal compromise; no blooder thinners, plt 198, INR < 1  Pt had grade 4 view with ETT placement for CTR, required bougie  We will have Kelly ready in case spinal does not take  Plan Factors-    Induction-     Postoperative Plan- Plan for postoperative opioid use  Informed Consent- Anesthetic plan and risks discussed with patient  I personally reviewed this patient with the CRNA  Discussed and agreed on the Anesthesia Plan with the CRNA  Patrick Palm

## 2018-06-14 NOTE — H&P (VIEW-ONLY)
[de-identified] y o male who is scheduled to undergo left total knee replacement this upcoming week, presents for evaluation of painful swelling in the anterior aspect of his right knee  Been present for few weeks, gradually getting worse  Occurs with some trauma to the direct anterior aspect of his right knee, occurs without systemic symptoms such as fevers or chills  Review of Systems  Review of systems negative unless otherwise specified in HPI    Past Medical History  Past Medical History:   Diagnosis Date    Hypertension     Sleep apnea     no CPAP        Past Surgical History  Past Surgical History:   Procedure Laterality Date    CARPAL TUNNEL RELEASE         Current Medications  Current Outpatient Prescriptions on File Prior to Visit   Medication Sig Dispense Refill    ascorbic acid (VITAMIN C) 500 mg tablet Take 500 mg by mouth daily      aspirin 81 mg chewable tablet Chew      Bimatoprost (LUMIGAN OP) Apply to eye      ferrous sulfate 325 (65 Fe) mg tablet Take 325 mg by mouth daily with breakfast      folic acid (FOLVITE) 1 mg tablet Take by mouth daily      lisinopril-hydrochlorothiazide (PRINZIDE,ZESTORETIC) 20-25 MG per tablet Take 1 tablet by mouth daily       No current facility-administered medications on file prior to visit  Recent Labs [facility])    0  Lab Value Date/Time   HCT 46 6 05/17/2018 1347   HGB 15 4 05/17/2018 1347   WBC 5 84 05/17/2018 1347   INR 0 94 05/17/2018 1347   GLUCOSE 92 05/17/2018 1347   HGBA1C 5 9 05/17/2018 1347         Physical exam  · General: Awake, Alert, Oriented  · Eyes: Pupils equal, round and reactive to light  · Heart: regular rate and rhythm  · Lungs: No audible wheezing  · Abdomen: soft  Gait pattern is antalgic  Right hip moves well  Right thighs devoid of atrophy right knee is in varus  There is no effusion  There is bony enlargement and tenderness medially  There is a soft tissue collection anterior to the right patella    The skin is not michelle in deep knee flexion  There is no erythema, there is no fluctuance  Calf compartments are soft and supple  Toes are warm, sensate, mobile  Imaging  No new x-rays accompany this patient    Procedure  An aspiration of the right knee prepatellar bursa was performed sterilely  It is documented below    Large joint arthrocentesis  Date/Time: 6/8/2018 11:38 AM  Consent given by: patient  Supporting Documentation  Indications: pain   Procedure Details  Location: knee - R knee    Aspirate amount: 30 mL  Patient tolerance: patient tolerated the procedure well with no immediate complications  Dressing:  Sterile dressing applied          Assessment/Plan:   82 y o male who has prepatellar bursitis  Aspiration of the hemorrhagic bursa is performed a bandages applied    I would welcome the opportunity see this patient back in the office next week, when he schedule undergo left total knee replacement surgery

## 2018-06-14 NOTE — DISCHARGE INSTRUCTIONS
Discharge Instructions - Orthopedics  Neeta Tim [de-identified] y o  male MRN: [id number]  Unit/Bed#: CW3 343-01    Weight Bearing Status:                                           Weight Bearing as tolerated to the left lower extremity  DVT prophylaxis:  Complete course of Lovenox as directed    Pain:  Continue analgesics as directed    Showering Instructions:   Do not shower until POD #3  Dressing Instructions:   Keep dressing clean, dry and intact until follow up appointment  Driving Instructions:  No driving until cleared by Orthopaedic Surgery  PT/OT:  Continue PT/OT on outpatient basis as directed    Appt Instructions:    If you do not have your appointment, please call the clinic at [phone]  Otherwise followup as scheduled below:

## 2018-06-14 NOTE — PLAN OF CARE
Problem: PHYSICAL THERAPY ADULT  Goal: Performs mobility at highest level of function for planned discharge setting  See evaluation for individualized goals  Treatment/Interventions: Functional transfer training, LE strengthening/ROM, Elevations, Therapeutic exercise, Endurance training, Bed mobility, Gait training, Spoke to nursing, Spoke to case management, Spoke to advanced practitioner  Equipment Recommended: Darrick Cox       See flowsheet documentation for full assessment, interventions and recommendations  Prognosis: Good  Problem List: Decreased strength, Decreased range of motion, Decreased endurance, Impaired balance, Decreased mobility, Obesity, Pain  Assessment: Pt is [de-identified] y o  male admitted with Dx of primary osteoarthritis of left knee, chronic pain of left knee, and hemorrhagic prepatellar bursitis right knee and underwent ARTHROPLASTY KNEE TOTAL (Left) and ASPIRATION RIGHT KNEE BURSA on 6/14/2018  Pt 's comorbidities affecting POC include: HTN, sleep apnea and personal factors of: advanced age and JUDITH  Pt's clinical presentation is currently unstable/unpredictable which is evident in ongoing suppl O2 needs at 3 L/min, postop pain and guarding limiting mobility skills, ongoing pulse ox monitoring, and (A)x2 required w/ all aspects of mobility when usually mobilizing (I) and w/o AD  Pt presents w/ postop pain, min generalized weakness, decreased (L) LE AROM and decreased (L) > (R) LE strength, decreased functional endurance and activity tolerance w/ elevated HR noted post limited mobilization, impaired balance w/ associated gait deviations requiring (A)x2 to assure safety and fall risk  Will cont to follow pt in PT for progressive mobilization to address above functional deficits and to max level of (I), endurance, and safety   Currently, anticipate pt will return home w/ family support upon D/C provided he cont improving w/ mobility skills, endurance and safety (incl on the steps) and appropriate level of support is available at home; home PT follow up may be required depending on progress; will follow  Barriers to Discharge: Inaccessible home environment     Recommendation: Home with family support, Home PT (pending progress; 1st floor set-up)          See flowsheet documentation for full assessment

## 2018-06-14 NOTE — PHYSICAL THERAPY NOTE
Physical Therapy Evaluation     Patient's Name: Courtney Kunz    Admitting Diagnosis  Primary osteoarthritis of left knee [M17 12]  Chronic pain of left knee [M2 562, I70 29]    Problem List  Patient Active Problem List   Diagnosis    Primary osteoarthritis of left knee    Chronic pain of left knee    Acute pain of right knee    Prepatellar bursitis of right knee       Past Medical History  Past Medical History:   Diagnosis Date    Hypertension     Sleep apnea     no CPAP        Past Surgical History  Past Surgical History:   Procedure Laterality Date    CARPAL TUNNEL RELEASE          06/14/18 2249   Note Type   Note type Eval only   Pain Assessment   Pain Assessment FLACC   Pain Type Acute pain;Surgical pain   Pain Location Incision;Knee   Pain Orientation Left   Pain Descriptors Aching;Burning;Discomfort   Pain Frequency Constant/continuous   Pain Onset Ongoing   Clinical Progression Not changed   Effect of Pain on Daily Activities guarding w/ mobility and discomfort w/ WB   Home Living   Type of [place] [id number] to live on main level with bedroom/bathroom  (1st floor set-up w/ 1 + 1 JUDITH or 2 JUDITH w/ no hand rail)   Home Equipment Walker;Cane  (? rollator)   Prior Function   Level of [place] Independent with ADLs and functional mobility  (amb w/o AD)   Lives With Spouse   Receives Help From Family   Restrictions/Precautions   LLE Weight Bearing Per Order WBAT   Braces or Orthoses (none at home; also no bracing at this time as per ortho PA)   Other Precautions Fall Risk;O2;Telemetry;Multiple lines;Pain; Chair Alarm  (chair alarm activated at the end of session)   General   Additional Pertinent History Cleared for assessment/mobilization (spoke to nsg);  WB status was also clarified w/ PA-C w/ ortho on the phone --> WBAT   Cognition   Overall Cognitive Status WFL   Arousal/Participation Alert   Orientation Level Oriented to person;Oriented to place;Oriented to situation   Memory Within functional limits   Following Commands Follows one step commands without difficulty   Comments Pt is observed in bed; responds to questions appropriately; pleasant and cooperative; agreeable to mobilize   RUE Assessment   RUE Assessment WFL  (AROM)   LUE Assessment   LUE Assessment WFL  (AROM)   RLE Assessment   RLE Assessment WFL  (AROM)   Strength RLE   RLE Overall Strength (fair +/ good - (grossly))   LLE Assessment   LLE Assessment X  (decreased AROM hip and knee)   Strength LLE   LLE Overall Strength (poor - hip and knee (grossl); fair ankle)   Bed Mobility   Supine to Sit 3  Moderate assistance   Additional items Assist x 1; Increased time required;Verbal cues;LE management  (stand by (A) of 2nd)   Transfers   Sit to Stand 3  Moderate assistance   Additional items Assist x 2; Increased time required;Verbal cues  (elevated bed surface)   Stand to Sit 3  Moderate assistance   Additional items Assist x 2;Verbal cues; Increased time required   Stand pivot 3  Moderate assistance   Additional items Assist x 2; Increased time required;Verbal cues  (bed to chair to the (L) side)   Additional Comments Pt was reclined in the chair w/ LE elevated at the end of session; pillows placed for back and head support; (B) foot pumps back on; ice to (L) knee; call bell and phone placed w/in reach; chair alarm on;   Ambulation/Elevation   Gait pattern Excessively slow; Step to;Short stride;Decreased L stance;Decreased foot clearance; Antalgic   Gait Assistance 3  Moderate assist   Additional items Assist x 2;Verbal cues; Tactile cues   Assistive Device Rolling walker   Distance 3 ft total distance for bed to chair transition   Balance   Static Sitting Fair -   Static Standing Poor   Dynamic Standing Poor -   Ambulatory Poor -   Activity Tolerance   Activity Tolerance Patient limited by fatigue;Patient limited by pain  (O2 sat at 95 %; HR in 110s bpm; RN aware)   Medical Staff Made Aware spoke to Kinjal Bland w/ ortho   Nurse Made Aware spoke to [place]   Assessment   Prognosis Good   Problem List Decreased strength;Decreased range of motion;Decreased endurance; Impaired balance;Decreased mobility;Obesity;Pain   Assessment Pt is [de-identified] y o  male admitted with Dx of primary osteoarthritis of left knee, chronic pain of left knee, and hemorrhagic prepatellar bursitis right knee and underwent ARTHROPLASTY KNEE TOTAL (Left) and ASPIRATION RIGHT KNEE BURSA on 6/14/2018  Pt 's comorbidities affecting POC include: HTN, sleep apnea and personal factors of: advanced age and JUDITH  Pt's clinical presentation is currently unstable/unpredictable which is evident in ongoing suppl O2 needs at 3 L/min, postop pain and guarding limiting mobility skills, ongoing pulse ox monitoring, and (A)x2 required w/ all aspects of mobility when usually mobilizing (I) and w/o AD  Pt presents w/ postop pain, min generalized weakness, decreased (L) LE AROM and decreased (L) > (R) LE strength, decreased functional endurance and activity tolerance w/ elevated HR noted post limited mobilization, impaired balance w/ associated gait deviations requiring (A)x2 to assure safety and fall risk  Will cont to follow pt in PT for progressive mobilization to address above functional deficits and to max level of (I), endurance, and safety  Currently, anticipate pt will return home w/ family support upon D/C provided he cont improving w/ mobility skills, endurance and safety (incl on the steps) and appropriate level of support is available at home; home PT follow up may be required depending on progress; will follow  Barriers to Discharge Inaccessible home environment   Goals   Patient Goals to be able to move   STG Expiration Date 06/24/18   Short Term Goal #1 7-10 days  Pt will amb 150 ft w/ rw, mod (I) in order to facilitate safe return to premorbid environment and at least household amb status   Pt will negotiate 1+1 steps w/ appropriate assistive device/technique, mod (I) in order to assure safe navigation in and out of the premorbid living environment  Pt will achieve (I) level w/ bed mob in order to facilitate safety with OOB and back to bed transitions in own living environment  Pt will perform transfers w/ mod (I) to assure (I) and safety w/ functional mobility/transitions w/ all aspects of mobility/locomotion  Pt will participate in LE therex, HEP and balance activities to max progression w/ mobility skills  Plan   Treatment/Interventions Functional transfer training;LE strengthening/ROM; Elevations; Therapeutic exercise; Endurance training;Bed mobility;Gait training;Spoke to nursing;Spoke to case management;Spoke to advanced practitioner   PT Frequency Weekend; Twice a day; Other (Comment)  (6-7x/week)   Recommendation   Recommendation Home with family support;Home PT  (pending progress; 1st floor set-up)   Equipment Recommended Walker   Modified [place] Scale   Modified Braxton Scale 4   Barthel Index   Feeding 10   Bathing 0   Grooming Score 5   Dressing Score 5   Bladder Score 0   Bowels Score 10   Toilet Use Score 5   Transfers (Bed/Chair) Score 5   Mobility (Level Surface) Score 0   Stairs Score 0   Barthel Index Score 40         Dennys Baumann, PT

## 2018-06-14 NOTE — OP NOTE
OPERATIVE REPORT  PATIENT NAME: Daniel Alva    :  1935  MRN: [id number]  Pt Location: [facility] OR ROOM 04    SURGERY DATE: 2018    Surgeon(s) and Role:     * Laura rBuno MD - Primary     * Linda Pena - Assisting     * Nette Lamar PA-C - Assisting    Preop Diagnosis:  Primary osteoarthritis of left knee [M17 12]  Chronic pain of left knee [M25 562, G89 29]  Hemorrhagic prepatellar bursitis right knee   Post-Op Diagnosis Codes:     * Primary osteoarthritis of left knee [M17 12]     * Chronic pain of left knee [M25 562, G89 29]  Hemorrhagic prepatellar bursitis right knee  Procedure(s) (LRB):  ARTHROPLASTY KNEE TOTAL (Left)  ASPIRATION RIGHT KNEE BURSA    Specimen(s):  * No specimens in log *    Estimated Blood Loss:   Minimal    Drains:  Closed/Suction Drain Left Knee Accordion 10 Fr  (Active)   Number of days: 0       Urethral Catheter Latex 16 Fr  (Active)   Number of days: 0       Anesthesia Type:   Spinal w/ Femoral Nerve Block    Operative Indications:  Primary osteoarthritis of left knee [M17 12]  Chronic pain of left knee [M25 562, G89 29]  Hemorrhagic prepatellar bursitis right knee    Operative Findings:  depuy attune   Femur-9   Poly-7   Tibia-8   Patella-41    Right prepatellar bursa had aspiration of 30 cc of blood, and a pressure bandage was applied    Complications:   None    Procedure and Technique: Following induction of adequate level of general anesthesia, Allen catheters and sterilely introduced into this patient's bladder  Antibiotics were administered  The left thigh was then fitted with a thigh-high tourniquet  The left lower extremity was then prepped and draped sterilely  The left lower extremity was exsanguinated gravity, tourniquet inflated to 300 mm of mercury  A midline knee incision was created the knee in flexion  Full-thickness flaps raised get the extensor mechanism  A medial arthrotomy was created to open up the knee joint    Bony soft tissue releases were performed where necessary  The distal femoral cut was made 6° valgus  This is made of a long intramedullary nathalie  The proximal tibia cut was then next  As this was a varus knee, 2 mm reference medially  Care was taken in order to protect the integrity medial collateral, lateral collateral, and posterior structures during these maneuvers  The distal femoral sizing guide was used, the appropriate form 1 cutting blocks impacted  The anterior, posterior, chamfer cuts were made  The box cut was then made for the posterior stabilized unit  With the trial components in, the knee was taken through range of motion, and found to be capable of full extension, good flexion, no mid flexion valgus instability  The patella was then resurfaced will utilize the [facility] equipment, it was found to be a size 41 mm button  The trial components removed, and the knee was prepared for insertion of cemented components  Cemented tibia, cemented femur, trial poly, and cemented patella placed  Excess cement was removed, and the knee was brought into extension  The cement was allowed to cure  The trial poly was taken out, the knee was packed off  The tourniquet was deflated, hemostasis was secured  The insert polyethylene was then snapped into position  The knee was taken through a final range of motion, and found to be capable of full extension, good flexion, no mid flexion valgus instability, and excellent patellar tracking  Satisfied with the extent of surgery, the wounds then flushed with saline closed  A Betadine soak was initiated  A drain was placed deep brought out via a separate lateral stab incision  The arthrotomy was closed number Vicryl suture  The subcu tissue closed 2 Vicryl suture  The skin was closed staples  A sterile dressings applied  Attention was then directed towards the right knee    Where following sterile prep and drape, an 18 gauge needle attached to a 60 cc syringe was utilized to drain the prepatellar bursa of approximately 30 cc of fluid  The needle was removed, pressure bandage was applied       He was awakened from general anesthesia, taken recovery room stable condition with plans to include physical therapy weight-bearing to tolerance, he will require DVT prophylaxis with Lovenox   I was present for the entire procedure    Patient Disposition:  PACU     SIGNATURE: Cm Schwarz MD  DATE: June 14, 2018  TIME: 11:13 AM

## 2018-06-14 NOTE — INTERVAL H&P NOTE
H&P reviewed  After examining the patient I find no changes in the patients condition since the H&P had been written  Preop for left total knee replacement, in addition I have added to the consent form aspiration of a hemorrhagic bursa overlying the right knee  Patient is in agreement

## 2018-06-15 LAB
ANION GAP SERPL CALCULATED.3IONS-SCNC: 8 MMOL/L (ref 4–13)
BASOPHILS # BLD AUTO: 0.01 THOUSANDS/ΜL (ref 0–0.1)
BASOPHILS NFR BLD AUTO: 0 % (ref 0–1)
BUN SERPL-MCNC: 18 MG/DL (ref 5–25)
CALCIUM SERPL-MCNC: 8.2 MG/DL (ref 8.3–10.1)
CHLORIDE SERPL-SCNC: 105 MMOL/L (ref 100–108)
CO2 SERPL-SCNC: 25 MMOL/L (ref 21–32)
CREAT SERPL-MCNC: 1.21 MG/DL (ref 0.6–1.3)
EOSINOPHIL # BLD AUTO: 0 THOUSAND/ΜL (ref 0–0.61)
EOSINOPHIL NFR BLD AUTO: 0 % (ref 0–6)
ERYTHROCYTE [DISTWIDTH] IN BLOOD BY AUTOMATED COUNT: 12.6 % (ref 11.6–15.1)
GFR SERPL CREATININE-BSD FRML MDRD: 56 ML/MIN/1.73SQ M
GLUCOSE SERPL-MCNC: 148 MG/DL (ref 65–140)
HCT VFR BLD AUTO: 32.5 % (ref 36.5–49.3)
HGB BLD-MCNC: 10.5 G/DL (ref 12–17)
IMM GRANULOCYTES # BLD AUTO: 0.04 THOUSAND/UL (ref 0–0.2)
IMM GRANULOCYTES NFR BLD AUTO: 1 % (ref 0–2)
LYMPHOCYTES # BLD AUTO: 0.8 THOUSANDS/ΜL (ref 0.6–4.47)
LYMPHOCYTES NFR BLD AUTO: 10 % (ref 14–44)
MCH RBC QN AUTO: 32.2 PG (ref 26.8–34.3)
MCHC RBC AUTO-ENTMCNC: 32.3 G/DL (ref 31.4–37.4)
MCV RBC AUTO: 100 FL (ref 82–98)
MONOCYTES # BLD AUTO: 0.83 THOUSAND/ΜL (ref 0.17–1.22)
MONOCYTES NFR BLD AUTO: 10 % (ref 4–12)
NEUTROPHILS # BLD AUTO: 6.68 THOUSANDS/ΜL (ref 1.85–7.62)
NEUTS SEG NFR BLD AUTO: 79 % (ref 43–75)
NRBC BLD AUTO-RTO: 0 /100 WBCS
PLATELET # BLD AUTO: 180 THOUSANDS/UL (ref 149–390)
PMV BLD AUTO: 9.8 FL (ref 8.9–12.7)
POTASSIUM SERPL-SCNC: 4.5 MMOL/L (ref 3.5–5.3)
RBC # BLD AUTO: 3.26 MILLION/UL (ref 3.88–5.62)
SODIUM SERPL-SCNC: 138 MMOL/L (ref 136–145)
WBC # BLD AUTO: 8.36 THOUSAND/UL (ref 4.31–10.16)

## 2018-06-15 PROCEDURE — 80048 BASIC METABOLIC PNL TOTAL CA: CPT | Performed by: ORTHOPAEDIC SURGERY

## 2018-06-15 PROCEDURE — G8987 SELF CARE CURRENT STATUS: HCPCS

## 2018-06-15 PROCEDURE — 97116 GAIT TRAINING THERAPY: CPT

## 2018-06-15 PROCEDURE — 97110 THERAPEUTIC EXERCISES: CPT

## 2018-06-15 PROCEDURE — 99024 POSTOP FOLLOW-UP VISIT: CPT | Performed by: ORTHOPAEDIC SURGERY

## 2018-06-15 PROCEDURE — 85025 COMPLETE CBC W/AUTO DIFF WBC: CPT | Performed by: ORTHOPAEDIC SURGERY

## 2018-06-15 PROCEDURE — G8988 SELF CARE GOAL STATUS: HCPCS

## 2018-06-15 PROCEDURE — 97166 OT EVAL MOD COMPLEX 45 MIN: CPT

## 2018-06-15 PROCEDURE — G8989 SELF CARE D/C STATUS: HCPCS

## 2018-06-15 RX ADMIN — TRAMADOL HYDROCHLORIDE 50 MG: 50 TABLET, FILM COATED ORAL at 23:19

## 2018-06-15 RX ADMIN — TRAMADOL HYDROCHLORIDE 50 MG: 50 TABLET, FILM COATED ORAL at 12:44

## 2018-06-15 RX ADMIN — CEFAZOLIN SODIUM 2000 MG: 10 INJECTION, POWDER, FOR SOLUTION INTRAVENOUS at 01:06

## 2018-06-15 RX ADMIN — FOLIC ACID 1 MG: 1 TABLET ORAL at 08:55

## 2018-06-15 RX ADMIN — TRAMADOL HYDROCHLORIDE 50 MG: 50 TABLET, FILM COATED ORAL at 06:05

## 2018-06-15 RX ADMIN — ASPIRIN 81 MG 81 MG: 81 TABLET ORAL at 08:55

## 2018-06-15 RX ADMIN — ENOXAPARIN SODIUM 40 MG: 100 INJECTION SUBCUTANEOUS at 08:54

## 2018-06-15 RX ADMIN — OXYCODONE HYDROCHLORIDE 10 MG: 10 TABLET ORAL at 08:54

## 2018-06-15 RX ADMIN — DOCUSATE SODIUM 100 MG: 100 CAPSULE, LIQUID FILLED ORAL at 08:55

## 2018-06-15 RX ADMIN — TRAMADOL HYDROCHLORIDE 50 MG: 50 TABLET, FILM COATED ORAL at 17:55

## 2018-06-15 RX ADMIN — DOCUSATE SODIUM 100 MG: 100 CAPSULE, LIQUID FILLED ORAL at 17:55

## 2018-06-15 RX ADMIN — Medication 325 MG: at 08:55

## 2018-06-15 RX ADMIN — BIMATOPROST 1 DROP: 0.1 SOLUTION/ DROPS OPHTHALMIC at 21:09

## 2018-06-15 RX ADMIN — ACETAMINOPHEN 650 MG: 325 TABLET, FILM COATED ORAL at 08:54

## 2018-06-15 RX ADMIN — OXYCODONE HYDROCHLORIDE 10 MG: 10 TABLET ORAL at 15:34

## 2018-06-15 RX ADMIN — OXYCODONE HYDROCHLORIDE 10 MG: 10 TABLET ORAL at 21:12

## 2018-06-15 RX ADMIN — TRAMADOL HYDROCHLORIDE 50 MG: 50 TABLET, FILM COATED ORAL at 01:00

## 2018-06-15 RX ADMIN — SODIUM CHLORIDE, SODIUM LACTATE, POTASSIUM CHLORIDE, AND CALCIUM CHLORIDE 1.5 ML/KG/HR: .6; .31; .03; .02 INJECTION, SOLUTION INTRAVENOUS at 01:06

## 2018-06-15 RX ADMIN — OXYCODONE HYDROCHLORIDE AND ACETAMINOPHEN 500 MG: 500 TABLET ORAL at 08:55

## 2018-06-15 RX ADMIN — SENNOSIDES 8.6 MG: 8.6 TABLET, FILM COATED ORAL at 08:55

## 2018-06-15 NOTE — PROGRESS NOTES
Orthopedics   Maycol Jean-Baptiste [de-identified] y o  male MRN: [id number]  Unit/Bed#: CW3 343-01      Subjective:  [de-identified] y o male post operative day 1 left total knee arthroplasty  Pt doing well  Pain controlled      Labs:    0  Lab Value Date/Time   HCT 32 5 (L) 06/15/2018 0523   HCT 46 6 05/17/2018 1347   HGB 10 5 (L) 06/15/2018 0523   HGB 15 4 05/17/2018 1347   INR 0 94 05/17/2018 1347   WBC 8 36 06/15/2018 0523   WBC 5 84 05/17/2018 1347       Meds:    Current Facility-Administered Medications:     acetaminophen (TYLENOL) tablet 650 mg, 650 mg, Oral, Q6H PRN, Martha Cake, 650 mg at 06/14/18 2146    ascorbic acid (VITAMIN C) tablet 500 mg, 500 mg, Oral, Daily, Martha Cake, 500 mg at 06/14/18 1328    aspirin chewable tablet 81 mg, 81 mg, Oral, Daily, Martha Cake, 81 mg at 06/14/18 1331    bimatoprost (LUMIGAN) 0 01 % ophthalmic solution 1 drop, 1 drop, Both Eyes, HS, Martha Cake, 1 drop at 06/14/18 2147    docusate sodium (COLACE) capsule 100 mg, 100 mg, Oral, BID, Martha Cake, 100 mg at 06/14/18 1748    enoxaparin (LOVENOX) subcutaneous injection 40 mg, 40 mg, Subcutaneous, Q24H GABBY, Martha Cake    ferrous sulfate tablet 325 mg, 325 mg, Oral, Daily With Breakfast, Martha Cake    folic acid (FOLVITE) tablet 1 mg, 1 mg, Oral, Daily, Martha Cake, 1 mg at 06/14/18 1328    hydrALAZINE (APRESOLINE) tablet 25 mg, 25 mg, Oral, Q8H PRN, Garon Polite, CRNP    HYDROmorphone (DILAUDID) injection 1 mg, 1 mg, Intravenous, Q4H PRN, Martha Cake    lactated ringers infusion, 1 5 mL/kg/hr, Intravenous, Continuous, Martha Cake, Last Rate: 193 5 mL/hr at 06/15/18 0106, 1 5 mL/kg/hr at 06/15/18 0106    ondansetron (ZOFRAN) injection 4 mg, 4 mg, Intravenous, Q4H PRN, Martha Cake    oxyCODONE (ROXICODONE) immediate release tablet 10 mg, 10 mg, Oral, Q4H PRN, Martha Cake    oxyCODONE (ROXICODONE) IR tablet 5 mg, 5 mg, Oral, Q4H PRN, Martha Cake    senna (SENOKOT) tablet 8 6 mg, 1 tablet, Oral, Daily, Dalton Jade, 8 6 mg at 06/14/18 1326    traMADol (ULTRAM) tablet 50 mg, 50 mg, Oral, Q6H [address], Demetra Area, 50 mg at 06/15/18 0605    Blood Culture:   No results found for: BLOODCX    Wound Culture:   No results found for: WOUNDCULT    Ins and Outs:  I/O last 24 hours: In: 1887 [P O :237; I V :1400; NG/GT:250]  Out: 1525 [Urine:1250; Drains:225; Blood:50]          Physical:  Vitals:    06/15/18 0300   BP: 115/58   Pulse: 88   Resp: 18   Temp: 98 5 °F (36 9 °C)   SpO2: 96%     left lower extremity:  · Dressings C/D/I  · Toes warm and well perfused  · HVx1  · [place] s/sp/dp/s  · EHL/FHL positive    _*_*_*_*_*_*_*_*_*_*_*_*_*_*_*_*_*_*_*_*_*_*_*_*_*_*_*_*_*_*_*_*_*_*_*_*_*_*_*_*_*    Assessment: 82 y o male post operative day 1 left total knee arthroplasty   Doing well    Plan:  · Weight Bearing as tolerated  · Up and out of bed  · DVT prophylaxis  · Analgesics  · PT/OT  · Patient noted to have acute blood loss anemia due to a drop in Hbg of > 2 0g from preop levels, will monitor vital signs and resuscitate with IV fluids as needed    Amanda Genao

## 2018-06-15 NOTE — PLAN OF CARE
Problem: DISCHARGE PLANNING - CARE MANAGEMENT  Goal: Discharge to post-acute care or home with appropriate resources  INTERVENTIONS:  - Conduct assessment to determine patient/family and health care team treatment goals, and need for post-acute services based on payer coverage, community resources, and patient preferences, and barriers to discharge  - Address psychosocial, clinical, and financial barriers to discharge as identified in assessment in conjunction with the patient/family and health care team  - Arrange appropriate level of post-acute services according to patient's   needs and preference and payer coverage in collaboration with the physician and health care team  - Communicate with and update the patient/family, physician, and health care team regarding progress on the discharge plan  - Arrange appropriate transportation to post-acute venues  -Assist patient and family with making referrals for Awilda MARI, follow up care    Outcome: Progressing

## 2018-06-15 NOTE — SOCIAL WORK
CM met with patient, explained CM role with introduction  Patient lives with wife in [facility] ,lives on first floor  Patient independent PTA,incluidng driving  Patient has a rollator, cane, commode and shower chair, has no prior C or inpatient rehab experience  Patient agreeable to [facility] if recommended by therapy and prefers [facility]  PCP is Dr Kj Alarcon, has prescription plan and uses [address] in [place]   Wife Liliana Torres is POA, patient believes paperwork has been brought into hospital already  Patient denies  MH or drug and alcohol treatment  Patient has Lovenox filled, has  outpt PT scheduled at [address] on [address]  AT this time, therapy recommending home  Therapy, ECINreferral sent to [facility] for home RN/PT/OT   Primary  is wife Liliana Torres, [phone]  Patient has 2 adult children who live on the same property as patient  Wife will be home to assist patient at home  CM reviewed d/c planning process including the following: identifying help at home, patient preference for d/c planning needs, Discharge Lounge, [facility] Meds to Bed program, availability of treatment team to discuss questions or concerns patient and/or family may have regarding understanding medications and recognizing signs and symptoms once discharged  CM also encouraged patient to follow up with all recommended appointments after discharge  Patient advised of importance for patient and family to participate in managing patients medical well being  ECIN referral sent to [facility] for RW and bedside commode  CM will follow for discharge needs

## 2018-06-15 NOTE — PLAN OF CARE
Problem: PHYSICAL THERAPY ADULT  Goal: Performs mobility at highest level of function for planned discharge setting  See evaluation for individualized goals  Treatment/Interventions: Functional transfer training, LE strengthening/ROM, Elevations, Therapeutic exercise, Endurance training, Bed mobility, Gait training, Spoke to nursing, Spoke to case management, Spoke to advanced practitioner  Equipment Recommended: Josie Weiss       See flowsheet documentation for full assessment, interventions and recommendations  Outcome: Progressing  Prognosis: Good  Problem List: Decreased strength, Decreased range of motion, Decreased endurance, Impaired balance, Decreased mobility, Obesity, Pain  Assessment: The pt  is ambulating household distances without any loss of balance  He does fatigue with ambulation, and he required a standing and then a seated rest  He was able to perform dynamic head movements during gait as well  He does remain limited from his baseline which continued physical therapy will assist with his return to independence  Barriers to Discharge: Inaccessible home environment     Recommendation: Home with family support, Outpatient PT     PT - OK to Discharge: Yes    See flowsheet documentation for full assessment

## 2018-06-15 NOTE — PHYSICAL THERAPY NOTE
PHYSICAL THERAPY TREATMENT NOTE    Patient Name: Tejal Tidwell  DSPJY'O Date: 6/15/2018     06/15/18 1039   Pain Assessment   Pain Assessment 0-10   Pain Score 6   Pain Type Acute pain   Pain Location Knee   Pain Orientation Left   Hospital Pain Intervention(s) Ambulation/increased activity;Repositioned   Response to Interventions tolerated   Restrictions/Precautions   Weight Bearing Precautions Per Order Yes   LLE Weight Bearing Per Order WBAT   Other Precautions Fall Risk;Telemetry;Multiple lines;Pain; Chair Alarm   General   Chart Reviewed Yes   Response to Previous Treatment Patient with no complaints from previous session  Family/Caregiver Present No   Cognition   Overall Cognitive Status WFL   Arousal/Participation Cooperative   Attention Within functional limits   Subjective   Subjective Pt agrees to PT treatment  Bed Mobility   Supine to Sit 4  Minimal assistance   Additional items Assist x 1;HOB elevated; Bedrails; Increased time required;Verbal cues;LE management   Sit to Supine Unable to assess  (left OOB in chair post session)   Transfers   Sit to Stand 4  Minimal assistance   Additional items Assist x 1; Increased time required;Verbal cues   Stand to Sit 4  Minimal assistance   Additional items Assist x 1; Increased time required;Verbal cues   Stand pivot 4  Minimal assistance   Additional items Assist x 1; Increased time required;Verbal cues  (with RW)   Ambulation/Elevation   Gait pattern Improper Weight shift; Antalgic; Forward Flexion;Decreased foot clearance; Short stride; Step to;Excessively slow  (progressed to "step through" towards end of gait training)   Gait Assistance 4  Minimal assist   Additional items Assist x 1;Verbal cues   Assistive Device Rolling walker   Distance 100` x2  (seated rest break)   Stair Management Assistance 3  Moderate assist   Additional items Assist x 1;Verbal cues   Stair Management Technique No rails; With walker  (curb step)   Number of Stairs 1   Balance   Static Sitting Fair   Dynamic Sitting Fair -   Static Standing Fair   Dynamic Standing Fair -   Ambulatory Fair -   Endurance Deficit   Endurance Deficit Yes   Endurance Deficit Description limited ambulation distance, sitting rest breaks   Activity Tolerance   Activity Tolerance Patient limited by fatigue;Patient limited by pain   Nurse Made Aware Per RN, pt appropriate to treat   Assessment   Prognosis Good   Problem List Decreased strength;Decreased range of motion;Decreased endurance; Impaired balance;Decreased mobility;Obesity;Pain   Assessment Pt tolerated treatment well and is progressing with functional mobility  Able to ambulate increased distance with decreased level of assist   Gait deviations decreased with increased distance/training  Pt is able to negotiate 1 curb step with mod A x1 and use of RW  Educated on appropriate sequencing and technique with fair understanding  Will continue to benefit from ongoing skilled PT to maximize his functional mobility and increase his level of independence  Recommending home with family support and OPPT pending progress  Goals   Patient Goals to move more today   STG Expiration Date 06/24/18   Treatment Day 1   Plan   Treatment/Interventions Functional transfer training;LE strengthening/ROM; Elevations; Therapeutic exercise; Endurance training;Patient/family training;Equipment eval/education; Bed mobility;Gait training   Progress Progressing toward goals   PT Frequency Twice a day  (6-7 days per week)   Recommendation   Recommendation Home with family support; Outpatient PT   Equipment Recommended Rylee Vance, PT,DPT

## 2018-06-15 NOTE — PROGRESS NOTES
Internal Medicine Progress Note  Patient: Daniel Alva  Age/sex: [de-identified] y o  male  Medical Record #: [id number]      ASSESSMENT/PLAN:  Daniel Alva is seen and examined and mangement for following issues:    # L TKR: states pain is controlled; no nausea and afebrile POD#2; Lovenox for DVT prophylaxis     # HTN: BP stable; due to risk of acute renal insufficiency, will hold lisinopril/hctz until discharge;  In the meantime will use hydralazine as needed for SBP >140     # Anemia: CBC stable and reviewed         Subjective: Patient seen and examined   New or overnight issues include no significant overnight events or reported nursing issues    ROS:   GI: denies abdominal pain, change bowel habits or reflux symptoms  Neuro: No new neurologic changes  Respiratory: No Cough, SOB  Cardiovascular: No CP, palpitations     Scheduled Meds:    Current Facility-Administered Medications:  acetaminophen 650 mg Oral Q6H PRN Linda Pena    ascorbic acid 500 mg Oral Daily Linda Pena    aspirin 81 mg Oral Daily Dalton Jade    bimatoprost 1 drop Both Eyes HS Dalton Jade    docusate sodium 100 mg Oral BID Dalton Jade    enoxaparin 40 mg Subcutaneous Q24H [address] Dalton Jade    ferrous sulfate 325 mg Oral Daily With Breakfast Dalton Jade    folic acid 1 mg Oral Daily Dalton Jade    hydrALAZINE 25 mg Oral Q8H PRN PARISH Veronica    HYDROmorphone 1 mg Intravenous Q4H PRN Dalton Jade    lactated ringers 1 5 mL/kg/hr Intravenous Continuous Linda Pena Last Rate: 1 5 mL/kg/hr (06/15/18 0106)   ondansetron 4 mg Intravenous Q4H PRN Linda Pena    oxyCODONE 10 mg Oral Q4H PRN Linda Pena    oxyCODONE 5 mg Oral Q4H PRN Dalton Jade    senna 1 tablet Oral Daily Dalton Jade    traMADol 50 mg Oral Q6H [address] Dalton [employer]:       Results from last 7 days  Lab Units 06/15/18  0523   WBC Thousand/uL 8 36   HEMOGLOBIN g/dL 10 5*   HEMATOCRIT % 32 5*   PLATELETS Thousands/uL 180       Results from last 7 days  Lab Units 06/15/18  0523 SODIUM mmol/L 138   POTASSIUM mmol/L 4 5   CHLORIDE mmol/L 105   CO2 mmol/L 25   BUN mg/dL 18   CREATININE mg/dL 1 21   GLUCOSE RANDOM mg/dL 148*   CALCIUM mg/dL 8 2*                Glucose (mg/dL)   Date Value   06/15/2018 148 (H)   05/17/2018 92       Labs reviewed    Physical Examination:  Vitals:   Vitals:    06/14/18 1900 06/14/18 2300 06/15/18 0300 06/15/18 0738   BP: 158/88 148/82 115/58 139/65   BP Location: Left arm Left arm Left arm Left arm   Pulse: 90 92 88 82   Resp: 17 19 18 20   Temp: 98 1 °F (36 7 °C) 98 1 °F (36 7 °C) 98 5 °F (36 9 °C) 98 °F (36 7 °C)   TempSrc: Oral Oral Oral Oral   SpO2: 94% 95% 96% 95%   Weight:       Height:           GEN: NAD  RESP: CTAB, no R/R/W  CV: +S1 S2, regular rate, no rubs  ABD: soft, NT, ND, normal BS   : catheter removed  EXT: DP pulses intact b/l  Neuro: AAOx3    [x ] Total time spent: 30 Mins and greater than 50% of this time was spent counseling/coordinating care

## 2018-06-15 NOTE — CASE MANAGEMENT
Initial Clinical Review    Age/Sex: [de-identified] y o  male  admitted on 6/14 for elective surgery - OR    Surgery Date: 6/14    Procedure: S/P ARTHROPLASTY KNEE TOTAL (Left Knee)      ASPIRATION RIGHT KNEE BURSA (Hip)    Anesthesia: General    Admission Orders: Date/Time/Statement: Inpatient 6/14/18 @ 1132 Med Surg     Orders Placed This Encounter   Procedures    Inpatient Admission     Standing Status:   Standing     Number of Occurrences:   1     Order Specific Question:   Admitting Physician     Answer:   Manny Cabral [[id number]]     Order Specific Question:   Level of Care     Answer:   Med Surg [16]     Order Specific Question:   Estimated length of stay     Answer:   Inpatient Only Surgery       Vital Signs: /65 (BP Location: Left arm)   Pulse 82   Temp 98 °F (36 7 °C) (Oral)   Resp 20   Ht 5' 9" (1 753 m)   Wt 129 kg (284 lb)   SpO2 95%   BMI 41 94 kg/m²     Diet:        Diet Orders            Start     Ordered    06/14/18 1307  Diet Regular; Regular House  Diet effective now     Question Answer Comment   Diet Type Regular    Regular Regular House    RD to adjust diet per protocol?  Yes        06/14/18 1306          Mobility: WBAT LLE  PT/OT eval and treat    DVT Prophylaxis: Foot Pump    Scheduled Meds:  Cefazolin  Intravenous x3   ascorbic acid 500 mg Oral Daily   aspirin 81 mg Oral Daily   bimatoprost 1 drop Both Eyes HS   docusate sodium 100 mg Oral BID   enoxaparin 40 mg Subcutaneous Q24H GABBY   ferrous sulfate 325 mg Oral Daily With Breakfast   folic acid 1 mg Oral Daily   senna 1 tablet Oral Daily   traMADol 50 mg Oral Q6H [address]     Continuous Infusions:  lactated ringers 1 5 mL/kg/hr Last Rate: 1 5 mL/kg/hr (06/15/18 0106)     PRN Meds:    Acetaminophen po x2    hydrALAZINE    HYDROmorphone    ondansetron    oxyCODONE po x1

## 2018-06-15 NOTE — PLAN OF CARE
Problem: PHYSICAL THERAPY ADULT  Goal: Performs mobility at highest level of function for planned discharge setting  See evaluation for individualized goals  Treatment/Interventions: Functional transfer training, LE strengthening/ROM, Elevations, Therapeutic exercise, Endurance training, Bed mobility, Gait training, Spoke to nursing, Spoke to case management, Spoke to advanced practitioner  Equipment Recommended: Nicolasa Gilman       See flowsheet documentation for full assessment, interventions and recommendations  Outcome: Progressing  Prognosis: Good  Problem List: Decreased strength, Decreased range of motion, Decreased endurance, Impaired balance, Decreased mobility, Obesity, Pain  Assessment: Pt tolerated treatment well and is progressing with functional mobility  Able to ambulate increased distance with decreased level of assist   Gait deviations decreased with increased distance/training  Pt is able to negotiate 1 curb step with mod A x1 and use of RW  Educated on appropriate sequencing and technique with fair understanding  Will continue to benefit from ongoing skilled PT to maximize his functional mobility and increase his level of independence  Recommending home with family support and OPPT pending progress  Barriers to Discharge: Inaccessible home environment     Recommendation: Home with family support, Outpatient PT          See flowsheet documentation for full assessment

## 2018-06-15 NOTE — PHYSICAL THERAPY NOTE
Physical Therapy Progress Note     06/15/18 1745   Pain Assessment   Pain Assessment 0-10   Pain Score 6   Pain Type Acute pain;Surgical pain   Pain Location Knee   Pain Orientation Left   Hospital Pain Intervention(s) Cold applied;Repositioned; Ambulation/increased activity; Emotional support   Response to Interventions Good  Restrictions/Precautions   RLE Weight Bearing Per Order WBAT   LLE Weight Bearing Per Order WBAT   Other Precautions Pain; Fall Risk   Subjective   Subjective The pt  is ready to work with therapy, but he would like to be back soon because he has ice cream coming with his dinner  Bed Mobility   Sit to Supine 5  Supervision   Additional items Increased time required   Transfers   Sit to Stand 5  Supervision   Stand to Sit 5  Supervision   Ambulation/Elevation   Gait pattern Excessively slow; Inconsistent angelika;Decreased foot clearance; Forward Flexion   Gait Assistance 5  Supervision   Additional items Verbal cues   Assistive Device Rolling walker   Distance 80 feet x 2  Balance   Static Sitting Good   Dynamic Sitting Fair   Static Standing Fair +   Ambulatory Fair -   Activity Tolerance   Activity Tolerance Patient tolerated treatment well;Patient limited by pain; Patient limited by fatigue   Nurse [address], RN  Exercises   TKR Supine;Sitting;Left;AAROM;15 reps   Assessment   Prognosis Good   Problem List Decreased strength;Decreased range of motion;Decreased endurance; Impaired balance;Decreased mobility;Obesity;Pain   Assessment The pt  is ambulating household distances without any loss of balance  He does fatigue with ambulation, and he required a standing and then a seated rest  He was able to perform dynamic head movements during gait as well  He does remain limited from his baseline which continued physical therapy will assist with his return to independence  Barriers to Discharge Inaccessible home environment   Goals   Patient Goals To get back home     STG Expiration Date 06/24/18   Treatment Day 2   Plan   Treatment/Interventions Functional transfer training;LE strengthening/ROM; Therapeutic exercise; Endurance training;Patient/family training;Bed mobility;Gait training;Elevations   Progress Progressing toward goals   PT Frequency Twice a day   Recommendation   Recommendation Home with family support; Outpatient PT   Equipment Recommended Nadia Plata   PT - OK to Discharge Yes     Che Bales, PTA

## 2018-06-16 VITALS
TEMPERATURE: 98.1 F | BODY MASS INDEX: 42.06 KG/M2 | HEIGHT: 69 IN | RESPIRATION RATE: 18 BRPM | HEART RATE: 101 BPM | OXYGEN SATURATION: 95 % | DIASTOLIC BLOOD PRESSURE: 70 MMHG | WEIGHT: 284 LBS | SYSTOLIC BLOOD PRESSURE: 138 MMHG

## 2018-06-16 LAB
ANION GAP SERPL CALCULATED.3IONS-SCNC: 7 MMOL/L (ref 4–13)
BASOPHILS # BLD AUTO: 0.05 THOUSANDS/ΜL (ref 0–0.1)
BASOPHILS NFR BLD AUTO: 1 % (ref 0–1)
BUN SERPL-MCNC: 20 MG/DL (ref 5–25)
CALCIUM SERPL-MCNC: 8.3 MG/DL (ref 8.3–10.1)
CHLORIDE SERPL-SCNC: 100 MMOL/L (ref 100–108)
CO2 SERPL-SCNC: 27 MMOL/L (ref 21–32)
CREAT SERPL-MCNC: 1.28 MG/DL (ref 0.6–1.3)
EOSINOPHIL # BLD AUTO: 0.01 THOUSAND/ΜL (ref 0–0.61)
EOSINOPHIL NFR BLD AUTO: 0 % (ref 0–6)
ERYTHROCYTE [DISTWIDTH] IN BLOOD BY AUTOMATED COUNT: 13 % (ref 11.6–15.1)
GFR SERPL CREATININE-BSD FRML MDRD: 53 ML/MIN/1.73SQ M
GLUCOSE SERPL-MCNC: 169 MG/DL (ref 65–140)
HCT VFR BLD AUTO: 30.8 % (ref 36.5–49.3)
HGB BLD-MCNC: 9.9 G/DL (ref 12–17)
IMM GRANULOCYTES # BLD AUTO: 0.07 THOUSAND/UL (ref 0–0.2)
IMM GRANULOCYTES NFR BLD AUTO: 1 % (ref 0–2)
LYMPHOCYTES # BLD AUTO: 1.05 THOUSANDS/ΜL (ref 0.6–4.47)
LYMPHOCYTES NFR BLD AUTO: 11 % (ref 14–44)
MCH RBC QN AUTO: 32.6 PG (ref 26.8–34.3)
MCHC RBC AUTO-ENTMCNC: 32.1 G/DL (ref 31.4–37.4)
MCV RBC AUTO: 101 FL (ref 82–98)
MONOCYTES # BLD AUTO: 0.96 THOUSAND/ΜL (ref 0.17–1.22)
MONOCYTES NFR BLD AUTO: 10 % (ref 4–12)
NEUTROPHILS # BLD AUTO: 7.59 THOUSANDS/ΜL (ref 1.85–7.62)
NEUTS SEG NFR BLD AUTO: 77 % (ref 43–75)
NRBC BLD AUTO-RTO: 0 /100 WBCS
PLATELET # BLD AUTO: 179 THOUSANDS/UL (ref 149–390)
PMV BLD AUTO: 9.8 FL (ref 8.9–12.7)
POTASSIUM SERPL-SCNC: 4.2 MMOL/L (ref 3.5–5.3)
RBC # BLD AUTO: 3.04 MILLION/UL (ref 3.88–5.62)
SODIUM SERPL-SCNC: 134 MMOL/L (ref 136–145)
WBC # BLD AUTO: 9.73 THOUSAND/UL (ref 4.31–10.16)

## 2018-06-16 PROCEDURE — 99024 POSTOP FOLLOW-UP VISIT: CPT | Performed by: PHYSICIAN ASSISTANT

## 2018-06-16 PROCEDURE — 85025 COMPLETE CBC W/AUTO DIFF WBC: CPT | Performed by: ORTHOPAEDIC SURGERY

## 2018-06-16 PROCEDURE — 80048 BASIC METABOLIC PNL TOTAL CA: CPT | Performed by: ORTHOPAEDIC SURGERY

## 2018-06-16 PROCEDURE — 97116 GAIT TRAINING THERAPY: CPT

## 2018-06-16 RX ORDER — TRAMADOL HYDROCHLORIDE 50 MG/1
50 TABLET ORAL EVERY 6 HOURS SCHEDULED
Qty: 30 TABLET | Refills: 0
Start: 2018-06-16 | End: 2018-06-16 | Stop reason: HOSPADM

## 2018-06-16 RX ORDER — HYDRALAZINE HYDROCHLORIDE 25 MG/1
25 TABLET, FILM COATED ORAL EVERY 8 HOURS PRN
Qty: 30 TABLET | Refills: 0 | Status: SHIPPED | OUTPATIENT
Start: 2018-06-16 | End: 2018-06-16 | Stop reason: HOSPADM

## 2018-06-16 RX ORDER — OXYCODONE HYDROCHLORIDE 5 MG/1
5 TABLET ORAL EVERY 4 HOURS PRN
Qty: 30 TABLET | Refills: 0
Start: 2018-06-16 | End: 2018-06-16 | Stop reason: HOSPADM

## 2018-06-16 RX ORDER — ACETAMINOPHEN 325 MG/1
TABLET ORAL
Qty: 30 TABLET | Refills: 0 | Status: SHIPPED | OUTPATIENT
Start: 2018-06-16 | End: 2018-06-29

## 2018-06-16 RX ORDER — LACTULOSE 20 G/30ML
20 SOLUTION ORAL ONCE
Status: COMPLETED | OUTPATIENT
Start: 2018-06-16 | End: 2018-06-16

## 2018-06-16 RX ADMIN — TRAMADOL HYDROCHLORIDE 50 MG: 50 TABLET, FILM COATED ORAL at 05:29

## 2018-06-16 RX ADMIN — DOCUSATE SODIUM 100 MG: 100 CAPSULE, LIQUID FILLED ORAL at 08:25

## 2018-06-16 RX ADMIN — OXYCODONE HYDROCHLORIDE AND ACETAMINOPHEN 500 MG: 500 TABLET ORAL at 08:24

## 2018-06-16 RX ADMIN — ENOXAPARIN SODIUM 40 MG: 100 INJECTION SUBCUTANEOUS at 08:24

## 2018-06-16 RX ADMIN — ASPIRIN 81 MG 81 MG: 81 TABLET ORAL at 08:24

## 2018-06-16 RX ADMIN — SENNOSIDES 8.6 MG: 8.6 TABLET, FILM COATED ORAL at 08:25

## 2018-06-16 RX ADMIN — FOLIC ACID 1 MG: 1 TABLET ORAL at 08:25

## 2018-06-16 RX ADMIN — LACTULOSE 20 G: 20 SOLUTION ORAL at 11:53

## 2018-06-16 RX ADMIN — TRAMADOL HYDROCHLORIDE 50 MG: 50 TABLET, FILM COATED ORAL at 11:53

## 2018-06-16 RX ADMIN — Medication 325 MG: at 08:25

## 2018-06-16 NOTE — PROGRESS NOTES
Orthopedics   Domingo Narayan [de-identified] y o  male MRN: [id number]  Unit/Bed#: CW3 343-01      Subjective:  [de-identified] y o male, POD #2, left total knee arthroplasty  Pt doing well  Pain controlled    Awake alert    Labs:    0  Lab Value Date/Time   HCT 30 8 (L) 06/16/2018 0455   HCT 32 5 (L) 06/15/2018 0523   HCT 46 6 05/17/2018 1347   HGB 9 9 (L) 06/16/2018 0455   HGB 10 5 (L) 06/15/2018 0523   HGB 15 4 05/17/2018 1347   INR 0 94 05/17/2018 1347   WBC 9 73 06/16/2018 0455   WBC 8 36 06/15/2018 0523   WBC 5 84 05/17/2018 1347       Meds:    Current Facility-Administered Medications:     acetaminophen (TYLENOL) tablet 650 mg, 650 mg, Oral, Q6H PRN, Wander Tomas, 650 mg at 06/15/18 0854    ascorbic acid (VITAMIN C) tablet 500 mg, 500 mg, Oral, Daily, Wander Tomas, 500 mg at 06/15/18 3355    aspirin chewable tablet 81 mg, 81 mg, Oral, Daily, Wander Tomas, 81 mg at 06/15/18 0855    bimatoprost (LUMIGAN) 0 01 % ophthalmic solution 1 drop, 1 drop, Both Eyes, HS, Wander Tomas, 1 drop at 06/15/18 2109    docusate sodium (COLACE) capsule 100 mg, 100 mg, Oral, BID, Wander Tomas, 100 mg at 06/15/18 1755    enoxaparin (LOVENOX) subcutaneous injection 40 mg, 40 mg, Subcutaneous, Q24H [address], Wander Tomas, 40 mg at 06/15/18 0007    ferrous sulfate tablet 325 mg, 325 mg, Oral, Daily With Breakfast, Wander Tomas, 325 mg at 70/19/77 1459    folic acid (FOLVITE) tablet 1 mg, 1 mg, Oral, Daily, Wander Tomas, 1 mg at 06/15/18 0855    hydrALAZINE (APRESOLINE) tablet 25 mg, 25 mg, Oral, Q8H PRN, PARISH Moscoso    HYDROmorphone (DILAUDID) injection 1 mg, 1 mg, Intravenous, Q4H PRN, Wander Tomas    lactated ringers infusion, 1 5 mL/kg/hr, Intravenous, Continuous, Wander Tomas, Last Rate: 193 5 mL/hr at 06/15/18 0106, 1 5 mL/kg/hr at 06/15/18 0106    ondansetron (ZOFRAN) injection 4 mg, 4 mg, Intravenous, Q4H PRN, Wander Tomas    oxyCODONE (ROXICODONE) immediate release tablet 10 mg, 10 mg, Oral, Q4H PRN, Wander Tomas, 10 mg at 06/15/18 8081   oxyCODONE (ROXICODONE) IR tablet 5 mg, 5 mg, Oral, Q4H PRN, Martha Cake    senna (SENOKOT) tablet 8 6 mg, 1 tablet, Oral, Daily, Martha Cake, 8 6 mg at 06/15/18 0855    traMADol (ULTRAM) tablet 50 mg, 50 mg, Oral, Q6H [address], Martha Cake, 50 mg at 06/16/18 9299    Blood Culture:   No results found for: BLOODCX    Wound Culture:   No results found for: WOUNDCULT    Ins and Outs:  I/O last 24 hours: In: 1440 [P O :1440]  Out: 750 [Urine:750]          Physical:  Vitals:    06/16/18 0700   BP: 138/70   Pulse: 101   Resp: 18   Temp: 98 1 °F (36 7 °C)   SpO2: 95%     left lower extremity:  · Dressings removed in their entirety  · Incision is clean dry intact  · Toes warm and well perfused  · No calf pain of the lower extremities and negative Homans test    _*_*_*_*_*_*_*_*_*_*_*_*_*_*_*_*_*_*_*_*_*_*_*_*_*_*_*_*_*_*_*_*_*_*_*_*_*_*_*_*_*    Assessment: 82 y o male POD #2 left total knee arthroplasty   Doing well    Plan:  · Weight Bearing as tolerated  · Up and out of bed  · DVT prophylaxis  · Analgesics  · PT/OT  · Discharge planning  · Patient noted to have acute blood loss anemia due to a drop in Hbg of > 2 0g from preop levels, will monitor vital signs and resuscitate with IV fluids as needed    Celena Valenzuela PA-C

## 2018-06-16 NOTE — SOCIAL WORK
CM made a DME referral to [facility] for a roller walker and bedside commode, and delivered the items to the Pt's room prior to his d/c

## 2018-06-16 NOTE — PHYSICAL THERAPY NOTE
Physical Therapy Progress Note     06/16/18 1232   Pain Assessment   Pain Assessment 0-10   Pain Score No Pain   Pain Type Acute pain;Surgical pain   Pain Location Knee   Pain Orientation Left   Hospital Pain Intervention(s) Medication (See MAR); Ambulation/increased activity;Repositioned   Restrictions/Precautions   Weight Bearing Precautions Per Order Yes   RUE Weight Bearing Per Order WBAT   LUE Weight Bearing Per Order WBAT   RLE Weight Bearing Per Order WBAT   Other Precautions Fall Risk;Pain   General   Chart Reviewed Yes   Response to Previous Treatment Patient with no complaints from previous session  Family/Caregiver Present No   Cognition   Overall Cognitive Status WFL   Arousal/Participation Alert; Cooperative   Attention Within functional limits   Orientation Level Oriented X4   Memory Within functional limits   Following Commands Follows all commands and directions without difficulty   Transfers   Sit to Stand 5  Supervision   Additional items Assist x 1; Armrests; Increased time required;Verbal cues   Stand to Sit 5  Supervision   Additional items Armrests; Increased time required;Verbal cues   Ambulation/Elevation   Gait pattern Improper Weight shift;Decreased L stance;Decreased foot clearance; Short stride; Step to;Shuffling;Excessively slow   Gait Assistance 5  Supervision   Additional items Assist x 1;Verbal cues   Assistive Device Rolling walker   Distance 70'x2   Stair Management Assistance 5  Supervision   Additional items Assist x 1   Stair Management Technique No rails; With walker; Foreward; Step to pattern   Number of Stairs 1  (curb)   Endurance Deficit   Endurance Deficit Yes   Activity Tolerance   Activity Tolerance Patient limited by fatigue;Patient limited by pain   Nurse Made Aware LUCY diamond to see   Assessment   Prognosis Good   Problem List Decreased strength;Decreased range of motion;Decreased endurance; Impaired balance;Pain;Obesity   Assessment Pt seated in recliner upon arrival agreeable to OOB ambulatyion  Pt performs all transfers and ambualtes 70'x2 with use of RW and supervision only  Able to negotioate 1 curb step required to enter his home  Pt will beneift from skilled PT upon d/c in order to amxmize safe fucntional mobility  Barriers to Discharge None   Goals   Patient Goals to go home   STG Expiration Date 06/24/18   Short Term Goal #1 Per PT: 7-10 days  Pt will amb 150 ft w/ rw, mod (I) in order to facilitate safe return to premorbid environment and at least household amb status  Pt will negotiate 1+1 steps w/ appropriate assistive device/technique, mod (I) in order to assure safe navigation in and out of the premorbid living environment  Pt will achieve (I) level w/ bed mob in order to facilitate safety with OOB and back to bed transitions in own living environment  Pt will perform transfers w/ mod (I) to assure (I) and safety w/ functional mobility/transitions w/ all aspects of mobility/locomotion  Pt will participate in LE therex, HEP and balance activities to max progression w/ mobility skills   Treatment Day 3   Plan   Treatment/Interventions Functional transfer training   Progress Progressing toward goals   PT Frequency Twice a day;7x/wk   Recommendation   Recommendation Home with family support; Outpatient PT   Equipment Recommended Hassan Shone   PT - OK to Discharge Yes     Verena Rider, PTA

## 2018-06-16 NOTE — PROGRESS NOTES
Internal Medicine Progress Note  Patient: Sheila Chandler  Age/sex: [de-identified] y o  male  Medical Record #: [id number]      ASSESSMENT/PLAN:  Sheila Chandler is seen and examined and mangement for following issues:    # L TKR/aspiration right knee (6/14/18): states pain is controlled; no nausea and afebrile; Lovenox for DVT prophylaxis     # HTN: BP stable; due to risk of acute renal insufficiency, had been holding Lisinopril HCT 20-25 = OK to restart tomorrow at home      # Anemia: CBC stable     #  Tachycardia: mild w/o SOB; likely 2/2 pain, anemia    #  ? CKD:  Creat prior to surgery was 1 28 in May 2018 but PCP note 1 yr ago had no mention of such and there are no other labs to compare to in the computer  Today is 1 28  OK to restart Lisinopril HCT tomorrow at home  Encourage fluids  BMP Tuesday with copy to PCP  Avoid NSAIDS    #  Hyponatremia:  Mild, will watch    #  Constipation:  Will give dose Lactulose today         Subjective: Patient seen and examined   Offers no complaints    ROS:   GI: denies abdominal pain, change bowel habits or reflux symptoms  Neuro: No new neurologic changes  Respiratory: No Cough, SOB  Cardiovascular: No CP, palpitations     Scheduled Meds:    Current Facility-Administered Medications:  acetaminophen 650 mg Oral Q6H PRN Marnette Prudent    ascorbic acid 500 mg Oral Daily Marnette Prudent    aspirin 81 mg Oral Daily Dalton Jade    bimatoprost 1 drop Both Eyes HS Dalton Jade    docusate sodium 100 mg Oral BID Dalton Jade    enoxaparin 40 mg Subcutaneous Q24H [address] Dalton Jade    ferrous sulfate 325 mg Oral Daily With Breakfast Dalton Jade    folic acid 1 mg Oral Daily Dalton Jade    hydrALAZINE 25 mg Oral Q8H PRN Meeker Ill, CRNP    HYDROmorphone 1 mg Intravenous Q4H PRN Dalton Jade    lactated ringers 1 5 mL/kg/hr Intravenous Continuous Marnette Prudent Last Rate: 1 5 mL/kg/hr (06/15/18 0106)   ondansetron 4 mg Intravenous Q4H PRN Marnette Prudent    oxyCODONE 10 mg Oral Q4H PRN Marnette Prudent    oxyCODONE 5 mg Oral Q4H PRN Nelwyn Fudge    senna 1 tablet Oral Daily Nelwyn Fudge    traMADol 50 mg Oral Q6H [facility] Dalton Cotesydney        Labs:       Results from last 7 days  Lab Units 06/16/18  0455 06/15/18  0523   WBC Thousand/uL 9 73 8 36   HEMOGLOBIN g/dL 9 9* 10 5*   HEMATOCRIT % 30 8* 32 5*   PLATELETS Thousands/uL 179 180       Results from last 7 days  Lab Units 06/16/18  0455 06/15/18  0523   SODIUM mmol/L 134* 138   POTASSIUM mmol/L 4 2 4 5   CHLORIDE mmol/L 100 105   CO2 mmol/L 27 25   BUN mg/dL 20 18   CREATININE mg/dL 1 28 1 21   GLUCOSE RANDOM mg/dL 169* 148*   CALCIUM mg/dL 8 3 8 2*                  Glucose (mg/dL)   Date Value   06/16/2018 169 (H)   06/15/2018 148 (H)   05/17/2018 92       Labs reviewed    Physical Examination:  Vitals:   Vitals:    06/15/18 2300 06/16/18 0000 06/16/18 0300 06/16/18 0700   BP: 110/53  124/60 138/70   BP Location: Right arm  Right arm Right arm   Pulse: (!) 108  (!) 113 101   Resp: 19  19 18   Temp: 98 3 °F (36 8 °C)  98 5 °F (36 9 °C) 98 1 °F (36 7 °C)   TempSrc: Oral  Oral Oral   SpO2: 92% 92% 93% 95%   Weight:       Height:           GEN: NAD  RESP: BBS w/o crackles/wheeze/rhonci; resp unlabored  CV: +S1 S2, regular rate, no rubs/murmurs  ABD: soft, NT, ND, normal BS   : catheter removed  EXT: tight Left leg edema and trace Right leg edema  Neuro: AAOx3    [x ] Total time spent: 30 Mins and greater than 50% of this time was spent counseling/coordinating care

## 2018-06-16 NOTE — MALNUTRITION/BMI
This medical record reflects one or more clinical indicators suggestive morbid obesity  Malnutrition Findings:              BMI Findings: Body mass index is 41 94 kg/m²  See Nutrition note dated 06/16/2018 for additional details  Completed nutrition assessment is viewable in the nutrition documentation

## 2018-06-16 NOTE — DISCHARGE SUMMARY
Discharge Summary - [facility] [de-identified] y o  male MRN: [id number]    Unit/Bed#: CW3 343-01 Encounter: [id number]    Admission Date:   Admission Orders     Ordered        06/14/18 1134  Inpatient Admission  Once               Admitting Diagnosis: Primary osteoarthritis of left knee [M17 12]  Chronic pain of left knee [M25 562, G89 29]    HPI:  Adult male who underwent admission for elective left total knee replacement    Procedures Performed:  Left total knee replacement arthroplasty    Summary of Hospital Course: This gentleman underwent admission after left total knee replacement arthroplasty  He received physical therapy analgesia and discharge planning    Significant Findings, Care, Treatment and Services Provided:  The patient did exceedingly well pain control was established and eventually discharged to home    Complications:  none    Discharge Diagnosis:   Status post left total knee replacement arthroplasty    Resolved Problems  Date Reviewed: 6/8/2018    None          Condition at Discharge: good         Discharge instructions/Information to patient and family:   See after visit summary for information provided to patient and family  Provisions for Follow-Up Care:  See after visit summary for information related to follow-up care and any pertinent home health orders  PCP: Troy Alvarenag DO    Disposition: Home    Planned Readmission: No    Discharge Statement   I spent 15 minutes discharging the patient  This time was spent on the day of discharge  I had direct contact with the patient on the day of discharge  Additional documentation is required if more than 30 minutes were spent on discharge  Discharge Medications:  See after visit summary for reconciled discharge medications provided to patient and family

## 2018-06-16 NOTE — PLAN OF CARE
Problem: PHYSICAL THERAPY ADULT  Goal: Performs mobility at highest level of function for planned discharge setting  See evaluation for individualized goals  Treatment/Interventions: Functional transfer training, LE strengthening/ROM, Elevations, Therapeutic exercise, Endurance training, Bed mobility, Gait training, Spoke to nursing, Spoke to case management, Spoke to advanced practitioner  Equipment Recommended: Quiana Atkins       See flowsheet documentation for full assessment, interventions and recommendations  Outcome: Adequate for Discharge  Prognosis: Good  Problem List: Decreased strength, Decreased range of motion, Decreased endurance, Impaired balance, Pain, Obesity  Assessment: Pt seated in recliner upon arrival agreeable to BELLA ambulatyion  Pt performs all transfers and ambualtes 70'x2 with use of RW and supervision only  Able to negotioate 1 curb step required to enter his home  Pt will beneift from skilled PT upon d/c in order to amxmize safe fucntional mobility  Barriers to Discharge: None     Recommendation: Home with family support, Outpatient PT     PT - OK to Discharge: Yes    See flowsheet documentation for full assessment

## 2018-06-18 ENCOUNTER — TELEPHONE (OUTPATIENT)
Dept: OBGYN CLINIC | Facility: HOSPITAL | Age: 83
End: 2018-06-18

## 2018-06-18 ENCOUNTER — EVALUATION (OUTPATIENT)
Dept: PHYSICAL THERAPY | Facility: CLINIC | Age: 83
End: 2018-06-18
Payer: COMMERCIAL

## 2018-06-18 DIAGNOSIS — Z96.652 HISTORY OF TOTAL LEFT KNEE REPLACEMENT: ICD-10-CM

## 2018-06-18 DIAGNOSIS — M17.12 PRIMARY OSTEOARTHRITIS OF LEFT KNEE: Primary | ICD-10-CM

## 2018-06-18 DIAGNOSIS — G89.29 CHRONIC PAIN OF LEFT KNEE: ICD-10-CM

## 2018-06-18 DIAGNOSIS — M25.562 CHRONIC PAIN OF LEFT KNEE: ICD-10-CM

## 2018-06-18 PROCEDURE — G8979 MOBILITY GOAL STATUS: HCPCS | Performed by: PHYSICAL THERAPIST

## 2018-06-18 PROCEDURE — G8978 MOBILITY CURRENT STATUS: HCPCS | Performed by: PHYSICAL THERAPIST

## 2018-06-18 PROCEDURE — 97161 PT EVAL LOW COMPLEX 20 MIN: CPT | Performed by: PHYSICAL THERAPIST

## 2018-06-18 PROCEDURE — 97110 THERAPEUTIC EXERCISES: CPT | Performed by: PHYSICAL THERAPIST

## 2018-06-18 NOTE — TELEPHONE ENCOUNTER
Call placed to follow up with pt, spoke with both pt and wife  Pt currently denying any pain  He reports he is taking one tablet of tramadol every 6 hours, 650mg of tylenol every 6 hours and one tablet of oxycodone every 6 hours to control his pain  Pt confirms he is ambulating using his walker, he denies falls  Pt reporting his incision "looks nice", he denies bleeding/drainage/redness  Pt confirms he is doing his daily lovenox injection without issues, he denies bleeding from any source  [facility] 6/18- he denies bloody stools  Pt denying CP/SOB/dizziness/fevers/calf pain  Pt reporting he had outpt PT appt today and "it was good"        Pt's wife has questions regarding his BP meds  Pt's wife reporting that [facility] prescribed the pt hydralazine 25mg to be taken every 8 hours for BP   I am not seeing this RX in EPIC or on pt's AVS   Per wife, pt is currently only taking hydralazine every 8 hours and is not taking the lisinopril/hctz  His BP this AM per wife was 136/76  Message out to [facility] to clarify if patient is supposed to be taking hydralazine 25mg every 8 hours AND lisinopril-hctz 20-25mg daily to control his BP? AVS, AVS med list and F/Us reviewed with pt  Pt and pt's wife deny having any further questions/concerns at this time

## 2018-06-18 NOTE — PROGRESS NOTES
PT Evaluation     Today's date: 2018  Patient name: Nithin Cristina  : 1935  MRN: [id number]  Referring provider: Alexandra Diaz MD  Dx:   Encounter Diagnosis     ICD-10-CM    1  Primary osteoarthritis of left knee M17 12 Ambulatory referral to Physical Therapy   2  Chronic pain of left knee M25 562 Ambulatory referral to Physical Therapy    G89 29    3  History of total left knee replacement Z96 652                   Assessment  Impairments: abnormal gait, abnormal muscle tone, abnormal or restricted ROM, abnormal movement, activity intolerance, impaired balance, impaired physical strength, lacks appropriate home exercise program, pain with function and weight-bearing intolerance    Assessment details: Patient is an [de-identified]year old male s/p L TKA on 18  He has decreased knee ROM, decreased LE strength, decreased balance, and abnormal gait  Patient would benefit from skilled physical therapy in order to address said deficits and improve functional mobility  Understanding of Dx/Px/POC: good   Prognosis: good    Goals  Increase knee flexion and extension > 10 degrees   Increase LE strength to 4/5  Patient will sit to stand without use of UE  Patient will be independent with HEP  Patient will be able to walk > 15 minutes     Plan  Patient would benefit from: skilled physical therapy  Planned therapy interventions: therapeutic activities, therapeutic exercise, therapeutic training, manual therapy, home exercise program, strengthening, flexibility, graded exercise, patient education, neuromuscular re-education and balance  Treatment plan discussed with: patient        Subjective Evaluation    History of Present Illness  Date of surgery: 2018  Mechanism of injury: Patient underwent total left knee replacement on 18  He was in the hospital for 2 days and was discharged home  Patient currently using RW and prior to surgery was using no AD  Patient is currently not driving     Quality of life: good    Pain  Current pain ratin  At worst pain ratin  Quality: dull ache and throbbing  Relieving factors: rest  Aggravating factors: walking and standing  Progression: improved    Social Support  Stairs in house: yes     Treatments  Current treatment: medication  Discharged from (in last 30 days): inpatient hospitalization  Patient Goals  Patient goals for therapy: increased strength, independence with ADLs/IADLs, improved balance, decreased pain, decreased edema and increased motion  Patient goal: walk up to 3 miles a day         Objective     Active Range of Motion   Left Knee   Flexion: 70 degrees   Extension: 22 degrees     Right Knee   Flexion: 119 degrees   Extension: 0 degrees     Passive Range of Motion   Left Knee   Flexion: 78 degrees     Strength/Myotome Testing     Left Knee   Flexion: 4-  Extension: 3+    Additional Strength Details  L hip flexion 3+      General Comments     Knee Comments  5TSTS: 29 93sec    TU 43 sec          Precautions: None    Daily Treatment Diary     Manual              PROM NV                                                                    Exercise Diary              bike NV            Heel slides 10x            SLR 10x            Quad sets NV            SAQ NV            Mini squats  NV            Heel raises  10x            Ankle pumps  20x            Bridges  NV            Standing hip abd NV            Standing hip ext NV                                                                                                                                     Modalities

## 2018-06-19 ENCOUNTER — TELEPHONE (OUTPATIENT)
Dept: OBGYN CLINIC | Facility: HOSPITAL | Age: 83
End: 2018-06-19

## 2018-06-19 NOTE — TELEPHONE ENCOUNTER
Call placed to discuss BP med regimen with pt  Pt reported to me yesterday he was not taking lisinopril-hctz and was only taking hydralazine  I clarified with Carlita Sharma [address], Nick Chávez advised me to review the chart and internist's notes  On 6/16, Yvan LUGO's note states "# HTN: BP stable; due to risk of acute renal insufficiency, had been holding Lisinopril HCT 20-25 = OK to restart tomorrow at home "  AVS also lists that pt should be taking lisinopril- hctz 20-25 QD- hydralazine not listed on AVS       I spoke with both pt and wife  I advised them that pt should be taking lisinopril- hctz 20-25 QD and that he should not be taking hydralazine (It is not listed on AVS)  Wife agreeable to have pt stop hydralazine and resume his maintenance dose of lisinopril-hctz  I also advised pt to F/U with his PCP to address the BP medication  Pt and wife are denying having additional questions/concerns at this time

## 2018-06-20 ENCOUNTER — OFFICE VISIT (OUTPATIENT)
Dept: PHYSICAL THERAPY | Facility: CLINIC | Age: 83
End: 2018-06-20
Payer: COMMERCIAL

## 2018-06-20 DIAGNOSIS — Z96.652 HISTORY OF TOTAL LEFT KNEE REPLACEMENT: ICD-10-CM

## 2018-06-20 DIAGNOSIS — M25.562 CHRONIC PAIN OF LEFT KNEE: ICD-10-CM

## 2018-06-20 DIAGNOSIS — M17.12 PRIMARY OSTEOARTHRITIS OF LEFT KNEE: Primary | ICD-10-CM

## 2018-06-20 DIAGNOSIS — G89.29 CHRONIC PAIN OF LEFT KNEE: ICD-10-CM

## 2018-06-20 PROCEDURE — 97140 MANUAL THERAPY 1/> REGIONS: CPT | Performed by: PHYSICAL THERAPIST

## 2018-06-20 PROCEDURE — 97112 NEUROMUSCULAR REEDUCATION: CPT | Performed by: PHYSICAL THERAPIST

## 2018-06-20 PROCEDURE — 97110 THERAPEUTIC EXERCISES: CPT | Performed by: PHYSICAL THERAPIST

## 2018-06-20 NOTE — PROGRESS NOTES
Daily Note     Today's date: 2018  Patient name: Maycol Jean-Baptiste  : 1935  MRN: [id number]  Referring provider: Roseline Soni MD  Dx:   Encounter Diagnosis     ICD-10-CM    1  Primary osteoarthritis of left knee M17 12    2  Chronic pain of left knee M25 562     G89 29    3  History of total left knee replacement Z96 652                   Subjective: Patient reports he is feeling ok  No increase in pain after his evaluation  His wife changed his bandage again today       Objective: See treatment diary below      Assessment: Patient tolerated treatment well  He had no reports of pain with exercises though has significant tightness in knee with activity as well as with PROM  Tolerated treatment   Patient demonstrated fatigue post treatment and would benefit from continued PT      Plan: Progress treatment as tolerated           Manual             PROM NV 10'                                                                   Exercise Diary             bike NV NV           Heel slides 10x 15x           SLR 10x 2x10           Quad sets NV 10x10s           SAQ NV 2x10 2" hold           Mini squats  NV            Heel raises  10x 2x10            Ankle pumps  20x HEP           Bridges  NV NV           Standing hip abd NV x10           Standing hip ext NV x10                                                                                                                                    Modalities              CP PRN  8'

## 2018-06-22 ENCOUNTER — OFFICE VISIT (OUTPATIENT)
Dept: OBGYN CLINIC | Facility: MEDICAL CENTER | Age: 83
End: 2018-06-22

## 2018-06-22 ENCOUNTER — APPOINTMENT (OUTPATIENT)
Dept: RADIOLOGY | Facility: MEDICAL CENTER | Age: 83
End: 2018-06-22
Payer: COMMERCIAL

## 2018-06-22 ENCOUNTER — APPOINTMENT (OUTPATIENT)
Dept: LAB | Facility: MEDICAL CENTER | Age: 83
End: 2018-06-22
Payer: COMMERCIAL

## 2018-06-22 ENCOUNTER — TRANSCRIBE ORDERS (OUTPATIENT)
Dept: ADMINISTRATIVE | Facility: HOSPITAL | Age: 83
End: 2018-06-22

## 2018-06-22 VITALS
BODY MASS INDEX: 42.06 KG/M2 | DIASTOLIC BLOOD PRESSURE: 76 MMHG | HEART RATE: 87 BPM | HEIGHT: 69 IN | WEIGHT: 284 LBS | SYSTOLIC BLOOD PRESSURE: 133 MMHG

## 2018-06-22 DIAGNOSIS — Z96.652 S/P TOTAL KNEE ARTHROPLASTY, LEFT: ICD-10-CM

## 2018-06-22 DIAGNOSIS — N18.9 CHRONIC KIDNEY DISEASE, UNSPECIFIED CKD STAGE: ICD-10-CM

## 2018-06-22 DIAGNOSIS — N18.9 CHRONIC KIDNEY DISEASE, UNSPECIFIED CKD STAGE: Primary | ICD-10-CM

## 2018-06-22 DIAGNOSIS — Z96.652 AFTERCARE FOLLOWING LEFT KNEE JOINT REPLACEMENT SURGERY: ICD-10-CM

## 2018-06-22 DIAGNOSIS — Z96.652 S/P TOTAL KNEE ARTHROPLASTY, LEFT: Primary | ICD-10-CM

## 2018-06-22 DIAGNOSIS — Z47.1 AFTERCARE FOLLOWING LEFT KNEE JOINT REPLACEMENT SURGERY: ICD-10-CM

## 2018-06-22 LAB
ANION GAP SERPL CALCULATED.3IONS-SCNC: 8 MMOL/L (ref 4–13)
BUN SERPL-MCNC: 17 MG/DL (ref 5–25)
CALCIUM SERPL-MCNC: 9.6 MG/DL (ref 8.3–10.1)
CHLORIDE SERPL-SCNC: 101 MMOL/L (ref 100–108)
CO2 SERPL-SCNC: 28 MMOL/L (ref 21–32)
CREAT SERPL-MCNC: 1.22 MG/DL (ref 0.6–1.3)
GFR SERPL CREATININE-BSD FRML MDRD: 56 ML/MIN/1.73SQ M
GLUCOSE SERPL-MCNC: 138 MG/DL (ref 65–140)
POTASSIUM SERPL-SCNC: 3.5 MMOL/L (ref 3.5–5.3)
SODIUM SERPL-SCNC: 137 MMOL/L (ref 136–145)

## 2018-06-22 PROCEDURE — 80048 BASIC METABOLIC PNL TOTAL CA: CPT

## 2018-06-22 PROCEDURE — 36415 COLL VENOUS BLD VENIPUNCTURE: CPT

## 2018-06-22 PROCEDURE — 99024 POSTOP FOLLOW-UP VISIT: CPT | Performed by: ORTHOPAEDIC SURGERY

## 2018-06-22 PROCEDURE — 73560 X-RAY EXAM OF KNEE 1 OR 2: CPT

## 2018-06-22 RX ORDER — BIMATOPROST 0.01 %
DROPS OPHTHALMIC (EYE)
COMMUNITY
Start: 2018-05-14

## 2018-06-22 RX ORDER — SULFAMETHOXAZOLE AND TRIMETHOPRIM 800; 160 MG/1; MG/1
1 TABLET ORAL EVERY 12 HOURS SCHEDULED
Qty: 14 TABLET | Refills: 1 | Status: SHIPPED | OUTPATIENT
Start: 2018-06-22 | End: 2018-06-29

## 2018-06-22 NOTE — PROGRESS NOTES
;Subjective    53-year-old adult male 1 week status post left total knee replacement arthroplasty  He is accompanied by his wife for today's visit  He has minimal need for medications since his surgery  He displays unilateral leg swelling of the left lower extremity  He displays bruising of the medial heel and the lower outer quadrant of his abdomen in an area where he  is not injecting Lovenox  He describes no pain or palpable cords of his left lower calf  Of note he also has some bismark-incisional erythema  X-rays were ordered of the left knee at today's appointment  Past Medical History:   Diagnosis Date    Hypertension     Sleep apnea     no CPAP        Past Surgical History:   Procedure Laterality Date    CARPAL TUNNEL RELEASE      HI ARTHROCENTESIS ASPIR&/INJ MAJOR JT/BURSA W/O US  6/14/2018    Procedure: ASPIRATION RIGHT KNEE BURSA; Surgeon: Kirti Mujica MD;  [facility];  Service: Orthopedics    HI TOTAL KNEE ARTHROPLASTY Left 6/14/2018    Procedure: ARTHROPLASTY KNEE TOTAL;  Surgeon: Kirti Mujica MD;  [facility];  Service: Orthopedics       History reviewed  No pertinent family history  Social History   Substance Use Topics    Smoking status: Former Smoker    Smokeless tobacco: Never Used      Comment: quit > 40 years ago     Alcohol use No     Exam;    He has an intact suture line of the left knee  He has some bismark-incisional erythematous coloration it only light needs with elevation waist high of the left lower extremity  He has circumferential swelling of the left calf negative Homans test no palpable cords  Knee extension with extension lag of approximately 10 degrees flexion to 90    Imaging;    Left total knee replacement components    Impression    Status post left total knee replacement 1st appt    Plan; He will continue DVT prophylaxis  He was placed on Bactrim DS for 1 week given the bismark-incisional erythema    He was given a joint replacement identification card  Dental antibiotic prophylaxis was held at the current time to avoid conflict with the Bactrim that was ordered  We will see him next week for potential removal of his staples

## 2018-06-25 ENCOUNTER — OFFICE VISIT (OUTPATIENT)
Dept: PHYSICAL THERAPY | Facility: CLINIC | Age: 83
End: 2018-06-25
Payer: COMMERCIAL

## 2018-06-25 DIAGNOSIS — Z96.652 HISTORY OF TOTAL LEFT KNEE REPLACEMENT: ICD-10-CM

## 2018-06-25 DIAGNOSIS — M17.12 PRIMARY OSTEOARTHRITIS OF LEFT KNEE: Primary | ICD-10-CM

## 2018-06-25 DIAGNOSIS — G89.29 CHRONIC PAIN OF LEFT KNEE: ICD-10-CM

## 2018-06-25 DIAGNOSIS — M25.562 CHRONIC PAIN OF LEFT KNEE: ICD-10-CM

## 2018-06-25 PROCEDURE — 97112 NEUROMUSCULAR REEDUCATION: CPT | Performed by: PHYSICAL THERAPIST

## 2018-06-25 PROCEDURE — 97110 THERAPEUTIC EXERCISES: CPT | Performed by: PHYSICAL THERAPIST

## 2018-06-25 PROCEDURE — 97140 MANUAL THERAPY 1/> REGIONS: CPT | Performed by: PHYSICAL THERAPIST

## 2018-06-25 NOTE — PROGRESS NOTES
Daily Note     Today's date: 2018  Patient name: Maycol Jean-Baptiste  : 1935  MRN: [id number]  Referring provider: Roseline Soni MD  Dx:   Encounter Diagnosis     ICD-10-CM    1  Primary osteoarthritis of left knee M17 12    2  Chronic pain of left knee M25 562     G89 29    3  History of total left knee replacement Z96 652                   Subjective: Patient with no new complaints today  He reports he has not been icing it though feels about the same as last week  Objective: See treatment diary below      Assessment: Patient tolerated treatment well though has difficulty with muscle guarding while performing PROM  Patient moves slowly through all exercises and has increased fatigue   Patient would benefit from continued PT      Plan: Progress treatment as tolerated           Manual            PROM NV 10' 10'                                                                  Exercise Diary            bike NV NV 5'          Heel slides 10x 15x 10x          SLR 10x 2x10 2x10           Quad sets NV 10x10s 10x10s          SAQ NV 2x10 2" hold 2x10           Mini squats  NV x10  2x10           Heel raises  10x 2x10  2x10           Bridges  NV NV NV          Standing hip abd NV x10 2x10          Standing hip ext NV x10 2x10                                                                                                                                   Modalities             CP PRN  8' 10'

## 2018-06-28 ENCOUNTER — OFFICE VISIT (OUTPATIENT)
Dept: PHYSICAL THERAPY | Facility: CLINIC | Age: 83
End: 2018-06-28
Payer: COMMERCIAL

## 2018-06-28 DIAGNOSIS — M25.562 CHRONIC PAIN OF LEFT KNEE: ICD-10-CM

## 2018-06-28 DIAGNOSIS — Z96.652 HISTORY OF TOTAL LEFT KNEE REPLACEMENT: ICD-10-CM

## 2018-06-28 DIAGNOSIS — M17.12 PRIMARY OSTEOARTHRITIS OF LEFT KNEE: Primary | ICD-10-CM

## 2018-06-28 DIAGNOSIS — G89.29 CHRONIC PAIN OF LEFT KNEE: ICD-10-CM

## 2018-06-28 PROCEDURE — 97110 THERAPEUTIC EXERCISES: CPT | Performed by: PHYSICAL THERAPIST

## 2018-06-28 PROCEDURE — 97530 THERAPEUTIC ACTIVITIES: CPT | Performed by: PHYSICAL THERAPIST

## 2018-06-28 PROCEDURE — 97140 MANUAL THERAPY 1/> REGIONS: CPT | Performed by: PHYSICAL THERAPIST

## 2018-06-29 ENCOUNTER — OFFICE VISIT (OUTPATIENT)
Dept: OBGYN CLINIC | Facility: MEDICAL CENTER | Age: 83
End: 2018-06-29

## 2018-06-29 VITALS
SYSTOLIC BLOOD PRESSURE: 123 MMHG | DIASTOLIC BLOOD PRESSURE: 67 MMHG | HEIGHT: 69 IN | HEART RATE: 109 BPM | BODY MASS INDEX: 42.06 KG/M2 | WEIGHT: 284 LBS

## 2018-06-29 DIAGNOSIS — Z47.1 AFTERCARE FOLLOWING LEFT KNEE JOINT REPLACEMENT SURGERY: Primary | ICD-10-CM

## 2018-06-29 DIAGNOSIS — Z96.652 AFTERCARE FOLLOWING LEFT KNEE JOINT REPLACEMENT SURGERY: Primary | ICD-10-CM

## 2018-06-29 PROCEDURE — 99024 POSTOP FOLLOW-UP VISIT: CPT | Performed by: ORTHOPAEDIC SURGERY

## 2018-06-29 NOTE — PROGRESS NOTES
[de-identified] y o male presents for 2 weeks postoperative visit status post left TKA and aspiration of right knee prepatellar bursa (op date: 6/14/18)  The patient is doing well and has been ambulating with assistance of walker  He has been participating in physical therapy and doing well  He is compliant with Lovenox usage for deep vein thrombosis prophylaxis  Review of Systems  Review of systems negative unless otherwise specified in HPI    Past Medical History  Past Medical History:   Diagnosis Date    Hypertension     Sleep apnea     no CPAP        Past Surgical History  Past Surgical History:   Procedure Laterality Date    CARPAL TUNNEL RELEASE      [place] ARTHROCENTESIS ASPIR&/INJ MAJOR JT/BURSA W/O US  6/14/2018    Procedure: ASPIRATION RIGHT KNEE BURSA;   Surgeon: Agnieszka Collins MD;  [facility];  Service: Orthopedics    MN TOTAL KNEE ARTHROPLASTY Left 6/14/2018    Procedure: ARTHROPLASTY KNEE TOTAL;  Surgeon: Agnieszka Collins MD;  [facility];  Service: Orthopedics       Current Medications  Current Outpatient Prescriptions on File Prior to Visit   Medication Sig Dispense Refill    acetaminophen (TYLENOL) 325 mg tablet Take 2 tablets (650 mg total) by mouth every 6 (six) hours as needed for mild pain for up to 30 days 60 tablet 0    aspirin 81 mg chewable tablet Chew      Bimatoprost (LUMIGAN OP) Apply to eye      enoxaparin (LOVENOX) 40 mg/0 4 mL Inject 0 4 mL (40 mg total) under the skin daily for 28 days 11 2 mL 0    lisinopril-hydrochlorothiazide (PRINZIDE,ZESTORETIC) 20-25 MG per tablet Take 1 tablet by mouth daily      LUMIGAN 0 01 % ophthalmic drops       oxyCODONE (ROXICODONE) 5 mg immediate release tablet 1-2 tabs po q4-6hr prn pain 30 tablet 0    sulfamethoxazole-trimethoprim (BACTRIM DS) 800-160 mg per tablet Take 1 tablet by mouth every 12 (twelve) hours for 7 days 14 tablet 1    [DISCONTINUED] acetaminophen (TYLENOL) 325 mg tablet Take 650 mg p o  Q 6 hours 30 tablet 0 No current facility-administered medications on file prior to visit  Recent Labs [facility])    0  Lab Value Date/Time   HCT 30 8 (L) 06/16/2018 0455   HGB 9 9 (L) 06/16/2018 0455   WBC 9 73 06/16/2018 0455   INR 0 94 05/17/2018 1347   GLUCOSE 138 06/22/2018 1226   HGBA1C 5 9 05/17/2018 1347         Physical exam  General: Awake, Alert, Oriented  HEENT: No scleral injection, no evidence of facial trauma  Heart: Extremities warm and well perfused  Lungs: No audible wheezing  ·   left Knee exam  · AROM 0-90 without pain  · Extremity warm and well perfused  · Midline knee incision healed well with no drainage or erythema or evidence of infection (staples removed today)  · Minimal swelling of thigh and calf    Imaging  No xrays obtained today    Procedure  Suture removal  Date/Time: 6/29/2018 1:23 PM  Performed by: Shailesh Rios by: Lisset Manual     Consent:     Consent obtained:  Verbal    Consent given by:  Patient  Location:     Laterality:  Left    Location:  Lower extremity    Lower extremity location:  Knee    Knee location:  L knee  Procedure details:     Nail bed suture material: staple remover  Wound appearance:  No sign(s) of infection    Staples removed: entire incision  Post-procedure details:     Post-removal:  Steri-Strips applied    Patient tolerance of procedure:   Tolerated well, no immediate complications      Assessment plan:  72-year-old male now 2 weeks status with left total knee arthroplasty and aspiration of right prepatellar bursa  WBAT as tolerated bilateral lower extremities  Staples removed today, tolerated well, Steri-Strips placed  Follow-up in 1 month in clinic for repeat eval    Claressa High  06/29/18

## 2018-07-02 ENCOUNTER — OFFICE VISIT (OUTPATIENT)
Dept: PHYSICAL THERAPY | Facility: CLINIC | Age: 83
End: 2018-07-02
Payer: COMMERCIAL

## 2018-07-02 DIAGNOSIS — M25.562 CHRONIC PAIN OF LEFT KNEE: ICD-10-CM

## 2018-07-02 DIAGNOSIS — Z96.652 HISTORY OF TOTAL LEFT KNEE REPLACEMENT: ICD-10-CM

## 2018-07-02 DIAGNOSIS — G89.29 CHRONIC PAIN OF LEFT KNEE: ICD-10-CM

## 2018-07-02 DIAGNOSIS — M17.12 PRIMARY OSTEOARTHRITIS OF LEFT KNEE: Primary | ICD-10-CM

## 2018-07-02 PROCEDURE — 97140 MANUAL THERAPY 1/> REGIONS: CPT | Performed by: PHYSICAL THERAPIST

## 2018-07-02 PROCEDURE — G8979 MOBILITY GOAL STATUS: HCPCS | Performed by: PHYSICAL THERAPIST

## 2018-07-02 PROCEDURE — G8978 MOBILITY CURRENT STATUS: HCPCS | Performed by: PHYSICAL THERAPIST

## 2018-07-02 PROCEDURE — 97530 THERAPEUTIC ACTIVITIES: CPT | Performed by: PHYSICAL THERAPIST

## 2018-07-02 NOTE — PROGRESS NOTES
Daily Note     Today's date: 2018  Patient name: Kaitlynn Brothers  : 1935  MRN: [id number]  Referring provider: Michele Mtz MD  Dx:   Encounter Diagnosis     ICD-10-CM    1  Primary osteoarthritis of left knee M17 12    2  Chronic pain of left knee M25 562     G89 29    3  History of total left knee replacement Z96 652                   Subjective: Patient states he got his staples out on Friday his knee feels slightly looser though notes its still tight  Objective: See treatment diary below     No charge 20 minutes today    Assessment: Patient tolerated session well today  He had mild difficulty with step up and overs with cues needed for form and foot placement  Patient with increased pain with seated knee flexion stretch though was able to attain more ROM with stretch in this position today  Patient would benefit from continued PT  Plan: Progress treatment as tolerated           Manual   72        PROM NV 10' 10' 10' Seated  12'                                                                Exercise Diary   72        bike NV NV 5' 6' 6'        Heel slides 10x 15x 10x 15x 20x         SLR 10x 2x10 2x10  2x10  2# 2x10         Quad sets NV 10x10s 10x10s 10x10s 10x10s        SAQ NV 2x10 2" hold 2x10  2x10 3 sec hold  D/C        LAQ     2x10         Mini squats  NV x10  2x10   2x10        Heel raises  10x 2x10  2x10  2x10  2x10         Bridges  NV NV NV np NP        Standing hip abd NV x10 2x10 2x10 2x10        Standing hip ext NV x10 2x10 2x10 2x10        Step ups     6" step x 15 6" x10        Up and over      4" box x10                                                                                                       Modalities   7/2         CP PRN  8' 10' 8' 8'

## 2018-07-06 ENCOUNTER — OFFICE VISIT (OUTPATIENT)
Dept: PHYSICAL THERAPY | Facility: CLINIC | Age: 83
End: 2018-07-06
Payer: COMMERCIAL

## 2018-07-06 DIAGNOSIS — M17.12 PRIMARY OSTEOARTHRITIS OF LEFT KNEE: Primary | ICD-10-CM

## 2018-07-06 DIAGNOSIS — G89.29 CHRONIC PAIN OF LEFT KNEE: ICD-10-CM

## 2018-07-06 DIAGNOSIS — M25.562 CHRONIC PAIN OF LEFT KNEE: ICD-10-CM

## 2018-07-06 DIAGNOSIS — Z96.652 HISTORY OF TOTAL LEFT KNEE REPLACEMENT: ICD-10-CM

## 2018-07-06 PROCEDURE — 97110 THERAPEUTIC EXERCISES: CPT | Performed by: PHYSICAL MEDICINE & REHABILITATION

## 2018-07-06 PROCEDURE — 97140 MANUAL THERAPY 1/> REGIONS: CPT | Performed by: PHYSICAL MEDICINE & REHABILITATION

## 2018-07-06 NOTE — PROGRESS NOTES
Daily Note     Today's date: 2018  Patient name: Chriss Hu  : 1935  MRN: [id number]  Referring provider: Irena Sevilla MD  Dx:   Encounter Diagnosis     ICD-10-CM    1  Primary osteoarthritis of left knee M17 12    2  Chronic pain of left knee M25 562     G89 29    3  History of total left knee replacement Z96 652                   Subjective: Patient offers no new complaints, notes the knee feels "a little heavy "      Objective: See treatment diary below    Assessment: Patient tolerated session well today  Patient transfers well with mild compensation, allows free movement of knee during ambulation with RW  Challenge with SLR with resistance  CP to conclude  Will consider gait training with SPC nv  Patient would benefit from continued PT  Plan: Progress treatment as tolerated           Manual         PROM NV 10' 10' 10' Seated  [facility]                                                               Exercise Diary         bike NV NV 5' 6' 6' 6'       Heel slides 10x 15x 10x 15x 20x  10x10"       SLR 10x 2x10 2x10  2x10  2# 2x10  2# 2x10       Quad sets NV 10x10s 10x10s 10x10s 10x10s np       SAQ NV 2x10 2" hold 2x10  2x10 3 sec hold  D/C ---       LAQ     2x10  2x10       Mini squats  NV x10  2x10   2x10 2x10       Heel raises  10x 2x10  2x10  2x10  2x10  2x10       Bridges  NV NV NV np NP np       Standing hip abd NV x10 2x10 2x10 2x10 2x10       Standing hip ext NV x10 2x10 2x10 2x10 2x10       Step ups     6" step x 15 6" x10 10x 6"       Up and over      4" box x10 4" 10x                                                                                                      Modalities   7        CP PRN  8' 10' 8' 8' 5'

## 2018-07-09 ENCOUNTER — OFFICE VISIT (OUTPATIENT)
Dept: PHYSICAL THERAPY | Facility: CLINIC | Age: 83
End: 2018-07-09
Payer: COMMERCIAL

## 2018-07-09 DIAGNOSIS — G89.29 CHRONIC PAIN OF LEFT KNEE: ICD-10-CM

## 2018-07-09 DIAGNOSIS — M17.12 PRIMARY OSTEOARTHRITIS OF LEFT KNEE: Primary | ICD-10-CM

## 2018-07-09 DIAGNOSIS — Z96.652 HISTORY OF TOTAL LEFT KNEE REPLACEMENT: ICD-10-CM

## 2018-07-09 DIAGNOSIS — M25.562 CHRONIC PAIN OF LEFT KNEE: ICD-10-CM

## 2018-07-09 PROCEDURE — 97140 MANUAL THERAPY 1/> REGIONS: CPT

## 2018-07-09 PROCEDURE — 97010 HOT OR COLD PACKS THERAPY: CPT

## 2018-07-09 PROCEDURE — 97110 THERAPEUTIC EXERCISES: CPT

## 2018-07-09 NOTE — PROGRESS NOTES
Daily Note     Today's date: 2018  Patient name: Kwame Richardson  : 1935  MRN: [id number]  Referring provider: Geraldo Wilkerson MD  Dx:   Encounter Diagnosis     ICD-10-CM    1  Primary osteoarthritis of left knee M17 12    2  Chronic pain of left knee M25 562     G89 29    3  History of total left knee replacement Z96 652                   Subjective: Patient denies significant soreness following last session and reports he feels like he does not need his walker        Objective:  Progressed to 8" for forward and initiated gait training with SPC today  See treatment diary below            Manual           PROM NV 10' 10' 10' Seated  12' seated, LH Flex seat, ext supine 10'                                                                                                               Exercise Diary           bike NV NV 5' 6' 6' 6'  6'         Heel slides 10x 15x 10x 15x 20x  10x10" :10x10         SLR 10x 2x10 2x10  2x10  2# 2x10  2# 2x10  2# 2x10         Quad sets NV 10x10s 10x10s 10x10s 10x10s np :10x10         SAQ NV 2x10 2" hold 2x10  2x10 3 sec hold  D/C ---           LAQ         2x10  2x10  2x10         Mini squats  NV x10  2x10    2x10 2x10  2x10         Heel raises  10x 2x10  2x10  2x10  2x10  2x10  2x10         Bridges  NV NV NV np NP np  np         Standing hip abd NV x10 2x10 2x10 2x10 2x10  2x10         Standing hip ext NV x10 2x10 2x10 2x10 2x10  2x10         Step ups        6" step x 15 6" x10 10x 6"  8" 10x         Up and over          4" box x10 4" 10x  4" 10x                                                                                                                                                                                       Modalities             CP PRN  8' 10' 8' 8' 5'  10'                                                            Assessment: Tolerated treatment well    Increased pain with PROM extension>flexion  Patient has difficulty maintaining extension with SLR  Patient demonstrates step through pattern when gait training with SPC, requires cues for upright posture and proper sequencing with fair carry over  Patient would benefit from continued PT  Plan: Progress treatment as tolerated

## 2018-07-12 ENCOUNTER — OFFICE VISIT (OUTPATIENT)
Dept: PHYSICAL THERAPY | Facility: CLINIC | Age: 83
End: 2018-07-12
Payer: COMMERCIAL

## 2018-07-12 DIAGNOSIS — M25.562 CHRONIC PAIN OF LEFT KNEE: ICD-10-CM

## 2018-07-12 DIAGNOSIS — G89.29 CHRONIC PAIN OF LEFT KNEE: ICD-10-CM

## 2018-07-12 DIAGNOSIS — M17.12 PRIMARY OSTEOARTHRITIS OF LEFT KNEE: Primary | ICD-10-CM

## 2018-07-12 DIAGNOSIS — Z96.652 HISTORY OF TOTAL LEFT KNEE REPLACEMENT: ICD-10-CM

## 2018-07-12 PROCEDURE — 97110 THERAPEUTIC EXERCISES: CPT | Performed by: PHYSICAL THERAPIST

## 2018-07-12 NOTE — PROGRESS NOTES
Daily Note     Today's date: 2018  Patient name: Juvencio Hernandez  : 1935  MRN: [id number]  Referring provider: Jihan Nance MD  Dx:   Encounter Diagnosis     ICD-10-CM    1  Primary osteoarthritis of left knee M17 12    2  Chronic pain of left knee M25 562     G89 29    3  History of total left knee replacement Z96 652                   Subjective: Patient reports his knee feels increasingly stiff today  It continues to be sore but no increase in pain      Objective: Patient ambulating safely with SPC  See treatment diary below           Manual         PROM NV 10' 10' 10' Seated  12' seated, LH Flex seat, ext supine 10'  flexion seatd , ext supine  10'                                                                                                             Exercise Diary         bike NV NV 5' 6' 6' 6'  6'  6'       Heel slides 10x 15x 10x 15x 20x  10x10" :10x10  10x10"       SLR 10x 2x10 2x10  2x10  2# 2x10  2# 2x10  2# 2x10  2# 2x10       Quad sets NV 10x10s 10x10s 10x10s 10x10s np :10x10  10x10"       LAQ         2x10  2x10  2x10  2#  3x10        Mini squats  NV x10  2x10    2x10 2x10  2x10  2x10       Heel raises  10x 2x10  2x10  2x10  2x10  2x10  2x10  2x10        Bridges  NV NV NV np NP np  np  np       Standing hip abd NV x10 2x10 2x10 2x10 2x10  2x10 2# 2x10       Standing hip ext NV x10 2x10 2x10 2x10 2x10  2x10  2# 2x10        Step ups        6" step x 15 6" x10 10x 6"  8" 10x  8" x10       Up and over          4" box x10 4" 10x  4" 10x  6" x10                                                                                                                                                                                     Modalities           CP PRN  8' 10' 8' 8' 5'  10'  10'                                                          Assessment: Tolerated treatment well   Patient had slightly increased distraction today though tolerated improved PROM into knee flexion  Patient was slightly sore with quad sets and continues to lack terminal knee extension  Progressed strengthening exercises today with added weight with no difficulty  Patient would benefit from continued PT  Plan: Progress treatment as tolerated

## 2018-07-16 ENCOUNTER — EVALUATION (OUTPATIENT)
Dept: PHYSICAL THERAPY | Facility: CLINIC | Age: 83
End: 2018-07-16
Payer: COMMERCIAL

## 2018-07-16 DIAGNOSIS — G89.29 CHRONIC PAIN OF LEFT KNEE: ICD-10-CM

## 2018-07-16 DIAGNOSIS — Z96.652 HISTORY OF TOTAL LEFT KNEE REPLACEMENT: ICD-10-CM

## 2018-07-16 DIAGNOSIS — M17.12 PRIMARY OSTEOARTHRITIS OF LEFT KNEE: Primary | ICD-10-CM

## 2018-07-16 DIAGNOSIS — M25.562 CHRONIC PAIN OF LEFT KNEE: ICD-10-CM

## 2018-07-16 PROCEDURE — 97140 MANUAL THERAPY 1/> REGIONS: CPT | Performed by: PHYSICAL THERAPIST

## 2018-07-16 PROCEDURE — G8978 MOBILITY CURRENT STATUS: HCPCS | Performed by: PHYSICAL THERAPIST

## 2018-07-16 PROCEDURE — G8979 MOBILITY GOAL STATUS: HCPCS | Performed by: PHYSICAL THERAPIST

## 2018-07-16 PROCEDURE — 97530 THERAPEUTIC ACTIVITIES: CPT | Performed by: PHYSICAL THERAPIST

## 2018-07-16 PROCEDURE — 97110 THERAPEUTIC EXERCISES: CPT | Performed by: PHYSICAL THERAPIST

## 2018-07-16 NOTE — PROGRESS NOTES
PT Re-Evaluation     Today's date: 2018  Patient name: Estela Neal  : 1935  MRN: [id number]  Referring provider: Tanvir Mackenzie MD  Dx:   Encounter Diagnosis     ICD-10-CM    1  Primary osteoarthritis of left knee M17 12    2  Chronic pain of left knee M25 562     G89 29    3  History of total left knee replacement Z96 652                   Assessment  Impairments: abnormal gait, abnormal muscle tone, abnormal or restricted ROM, abnormal movement, activity intolerance, impaired balance, impaired physical strength, lacks appropriate home exercise program, pain with function and weight-bearing intolerance    Assessment details: Patient has demonstrated progression in ROM and strength since his evaluation  He has very mild pain levels and has difficulty with ambulation and balance due to decreased knee ROM  Patient would benefit from continued physical therapy in order to address said deficits and improve functional mobility     Understanding of Dx/Px/POC: good   Prognosis: good    Goals  Increase knee flexion and extension > 10 degrees -met  Increase knee flexion to 110 degrees   Increase LE strength to 4/5-met  Patient will sit to stand without use of UE-partially met   Patient will be independent with HEP-partially met  Patient will be able to walk > 15 minutes -met  Patient will ambulate safely without AD    Plan  Patient would benefit from: skilled physical therapy  Planned therapy interventions: therapeutic activities, therapeutic exercise, therapeutic training, manual therapy, home exercise program, strengthening, flexibility, graded exercise, patient education, neuromuscular re-education and balance  Frequency: 2x week  Duration in weeks: 4  Treatment plan discussed with: patient        Subjective Evaluation    History of Present Illness  Date of surgery: 2018  Mechanism of injury: Patient reports his knee is feeling better since he had his surgery and notes he is feeling about 50% better since his operation  He is able to go up the steps reciprocally though ocntinues to do one at a time going down  He is usually using no AD at home though carries a [facility] with him for safety as needed     Quality of life: good    Pain  Current pain ratin  At worst pain ratin  Quality: dull ache and throbbing  Relieving factors: rest  Aggravating factors: walking and standing  Progression: improved    Social Support  Stairs in house: yes     Treatments  Current treatment: medication  Discharged from (in last 30 days): inpatient hospitalization  Patient Goals  Patient goals for therapy: increased strength, independence with ADLs/IADLs, improved balance, decreased pain, decreased edema and increased motion  Patient goal: walk up to 3 miles a day         Objective     Active Range of Motion   Left Knee   Flexion: 90 degrees   Extension: 12 degrees     Right Knee   Flexion: 119 degrees   Extension: 0 degrees     Passive Range of Motion   Left Knee   Flexion: 98 degrees   Extension: 8 degrees     Additional Passive Range of Motion Details  98 degrees short sit position with knee flexion PROM    Strength/Myotome Testing     Left Knee   Flexion: 5  Extension: 5    Additional Strength Details  L hip flexion 5-      General Comments     Knee Comments  5TSTS: 29 93sec    TU 43 sec          Precautions: None    Daily Treatment Diary        Manual   7/2      PROM NV 10' 10' 10' Seated  12' seated, LH Flex seat, ext supine 10'  flexion seatd , ext supine  10'  10'                                                                                                           Exercise Diary   7/2 716     bike NV NV 5' 6' 6' 6'  6'  6'  6'     Heel slides 10x 15x 10x 15x 20x  10x10" :10x10  10x10"  10x10"      SLR 10x 2x10 2x10  2x10  2# 2x10  2# 2x10  2# 2x10  2# 2x10  2# 2x10     Quad sets NV 10x10s 10x10s 10x10s 10x10s np :10x10  10x10"  15x10"      LAQ         2x10  2x10  2x10  2#  3x10   3# 2x10      Mini squats  NV x10  2x10    2x10 2x10  2x10  2x10  Leg press  90# 2x10   110#   Heel raises  10x 2x10  2x10  2x10  2x10  2x10  2x10  2x10   2x10     Standing hip abd NV x10 2x10 2x10 2x10 2x10  2x10 2# 2x10  2# 2x10     Standing hip ext NV x10 2x10 2x10 2x10 2x10  2x10  2# 2x10   2# 2x10     Step ups        6" step x 15 6" x10 10x 6"  8" 10x  8" x10 np     Up and over          4" box x10 4" 10x  4" 10x  6" x10 6" stair case today up/down x3                                                                                                                                                                                   Modalities  6/20 6/25 6/28 7/2 7/6 7/9 7/12 7/16       CP PRN  8' 10' 8' 8' 5'  10'  10'  5'

## 2018-07-19 ENCOUNTER — OFFICE VISIT (OUTPATIENT)
Dept: PHYSICAL THERAPY | Facility: CLINIC | Age: 83
End: 2018-07-19
Payer: COMMERCIAL

## 2018-07-19 DIAGNOSIS — M17.12 PRIMARY OSTEOARTHRITIS OF LEFT KNEE: Primary | ICD-10-CM

## 2018-07-19 DIAGNOSIS — Z96.652 HISTORY OF TOTAL LEFT KNEE REPLACEMENT: ICD-10-CM

## 2018-07-19 DIAGNOSIS — M25.562 CHRONIC PAIN OF LEFT KNEE: ICD-10-CM

## 2018-07-19 DIAGNOSIS — G89.29 CHRONIC PAIN OF LEFT KNEE: ICD-10-CM

## 2018-07-19 PROCEDURE — 97110 THERAPEUTIC EXERCISES: CPT | Performed by: PHYSICAL THERAPIST

## 2018-07-19 PROCEDURE — 97530 THERAPEUTIC ACTIVITIES: CPT | Performed by: PHYSICAL THERAPIST

## 2018-07-19 PROCEDURE — 97140 MANUAL THERAPY 1/> REGIONS: CPT | Performed by: PHYSICAL THERAPIST

## 2018-07-19 NOTE — PROGRESS NOTES
Daily Note     Today's date: 2018  Patient name: Girma Livingston  : 1935  MRN: [id number]  Referring provider: Jaki Skinner MD  Dx:   Encounter Diagnosis     ICD-10-CM    1  Primary osteoarthritis of left knee M17 12    2  Chronic pain of left knee M25 562     G89 29    3  History of total left knee replacement Z96 652                   Subjective: Patient reports he is still tight in his knee but no increased pain or complaints after last session       Objective: Patient ambulating safely with SPC    See treatment diary below      10 minutes no charge today   Manual     PROM NV 10' 10' 10' Seated  12' seated, LH Flex seat, ext supine 10'  flexion seatd , ext supine  10'  10'  10'                                                                                                         Exercise Diary   7   bike NV NV 5' 6' 6' 6'  6'  6'  6'  6'   Heel slides 10x 15x 10x 15x 20x  10x10" :10x10  10x10"  10x10"   10x10"   SLR 10x 2x10 2x10  2x10  2# 2x10  2# 2x10  2# 2x10  2# 2x10  2# 2x10 3# 3x10    Quad sets NV 10x10s 10x10s 10x10s 10x10s np :10x10  10x10"  15x10"   15x10"   LAQ         2x10  2x10  2x10  2#  3x10   3# 2x10   5# 3x10    Mini squats  NV x10  2x10    2x10 2x10  2x10  2x10  Leg press  90# 2x10   110#  3x10    Heel raises  10x 2x10  2x10  2x10  2x10  2x10  2x10  2x10   2x10 3x10   Standing hip abd NV x10 2x10 2x10 2x10 2x10  2x10 2# 2x10  2# 2x10  3# 2x10   Standing hip ext NV x10 2x10 2x10 2x10 2x10  2x10  2# 2x10   2# 2x10  3# 2x10   Step ups        6" step x 15 6" x10 10x 6"  8" 10x  8" x10 np  np   Up and over          4" box x10 4" 10x  4" 10x  6" x10 6" stair case today up/down x3  6"    x20                                                                                                                                                                                 Modalities  6/20 6/25 6/28 7 7/6 7/9 7/12 7/16 7/19     CP PRN  8' 10' 8' 8' 5'  10'  10'  5'  5'                                                      Assessment: Tolerated treatment well  He has more pain in knee with PROM into extension > flexion seated  He move through exercises with increased weight well, no pain noted  Cues for proper form and position on steps needed  Patient would benefit from continued PT  Radha Wallace Plan: Progress treatment as tolerated

## 2018-07-23 ENCOUNTER — OFFICE VISIT (OUTPATIENT)
Dept: PHYSICAL THERAPY | Facility: CLINIC | Age: 83
End: 2018-07-23
Payer: COMMERCIAL

## 2018-07-23 DIAGNOSIS — Z96.652 HISTORY OF TOTAL LEFT KNEE REPLACEMENT: ICD-10-CM

## 2018-07-23 DIAGNOSIS — M17.12 PRIMARY OSTEOARTHRITIS OF LEFT KNEE: Primary | ICD-10-CM

## 2018-07-23 DIAGNOSIS — M25.562 CHRONIC PAIN OF LEFT KNEE: ICD-10-CM

## 2018-07-23 DIAGNOSIS — G89.29 CHRONIC PAIN OF LEFT KNEE: ICD-10-CM

## 2018-07-23 PROCEDURE — 97110 THERAPEUTIC EXERCISES: CPT

## 2018-07-23 PROCEDURE — 97112 NEUROMUSCULAR REEDUCATION: CPT

## 2018-07-23 NOTE — PROGRESS NOTES
Daily Note     Today's date: 2018  Patient name: Lorin Schwab  : 1935  MRN: [id number]  Referring provider: Robi Chung MD  Dx:   Encounter Diagnosis     ICD-10-CM    1  Primary osteoarthritis of left knee M17 12    2  Chronic pain of left knee M25 562     G89 29    3  History of total left knee replacement Z96 652                   Subjective: Pt continues to complain of swelling in L LE  Objective: See treatment diary below        10 minutes no charge today   Manual              PROM (flexion seated, ext supine) 10'                                                                                                                  Exercise Diary              bike 6' ROM            Heel slides 10x10"            SLR 3# 3x10            Quad sets 10x10"            LAQ 5# 3x10               Leg press  110# 2x20            Heel raises  3x10            Standing hip abd 3# 3x10            Standing hip ext 3# 3x10            Step ups   np            Up and over   6" x20                                                                                                                                                                                             Modalities               CP PRN                                                                    Assessment: Tolerated treatment well  Patient would benefit from continued PT  Passive knee extension continues to be limited by pain and swelling  Pt still unable to pedal fully around on bike  Plan: Progress treatment as tolerated

## 2018-07-26 ENCOUNTER — OFFICE VISIT (OUTPATIENT)
Dept: PHYSICAL THERAPY | Facility: CLINIC | Age: 83
End: 2018-07-26
Payer: COMMERCIAL

## 2018-07-26 DIAGNOSIS — M25.562 CHRONIC PAIN OF LEFT KNEE: ICD-10-CM

## 2018-07-26 DIAGNOSIS — Z96.652 HISTORY OF TOTAL LEFT KNEE REPLACEMENT: ICD-10-CM

## 2018-07-26 DIAGNOSIS — G89.29 CHRONIC PAIN OF LEFT KNEE: ICD-10-CM

## 2018-07-26 DIAGNOSIS — M17.12 PRIMARY OSTEOARTHRITIS OF LEFT KNEE: Primary | ICD-10-CM

## 2018-07-26 PROCEDURE — 97110 THERAPEUTIC EXERCISES: CPT

## 2018-07-26 PROCEDURE — 97140 MANUAL THERAPY 1/> REGIONS: CPT

## 2018-07-26 NOTE — PROGRESS NOTES
Daily Note     Today's date: 2018  Patient name: Tejal Tidwell  : 1935  MRN: [id number]  Referring provider: Iesha Fulton MD  Dx:   Encounter Diagnosis     ICD-10-CM    1  Primary osteoarthritis of left knee M17 12    2  Chronic pain of left knee M25 562     G89 29    3  History of total left knee replacement Z96 652                   Subjective: Pt states no sig change in sx  Pt states he has an MDV tomorrow  Objective: See treatment diary below  Manual                     PROM (flexion seated, ext supine) 10'  10'                                                                                                                         Exercise Diary                     bike 6' ROM 6' rom                   Heel slides 10x10" 10x10"                   SLR 3# 3x10 3# 3x10                   Quad sets 10x10" 10x10"                   LAQ 5# 3x10 5# 3x10                   Leg press  110# 2x20 110# 2x20                   Heel raises  3x10 3x10                   Standing hip abd 3# 3x10 3# 3x10                   Standing hip ext 3# 3x10 3# 3x10                   Step ups -lateral  np 6" x10                   Up and over   6" x20 6" x20                                            SLS    10"x3                                                                                                                                                 Modalities                     CP PRN     10'                                                                         Assessment: Tolerated treatment fair  Patient would benefit from continued PT  PROM limited by swelling  Added lateral step ups and SLS to program w/ out sig difficulty  Group setting:       Plan: Progress treatment as tolerated

## 2018-07-27 ENCOUNTER — OFFICE VISIT (OUTPATIENT)
Dept: OBGYN CLINIC | Facility: MEDICAL CENTER | Age: 83
End: 2018-07-27

## 2018-07-27 VITALS
DIASTOLIC BLOOD PRESSURE: 60 MMHG | HEART RATE: 72 BPM | BODY MASS INDEX: 40.09 KG/M2 | SYSTOLIC BLOOD PRESSURE: 123 MMHG | HEIGHT: 70 IN | WEIGHT: 280 LBS | RESPIRATION RATE: 18 BRPM

## 2018-07-27 DIAGNOSIS — Z47.1 AFTERCARE FOLLOWING LEFT KNEE JOINT REPLACEMENT SURGERY: Primary | ICD-10-CM

## 2018-07-27 DIAGNOSIS — Z96.652 AFTERCARE FOLLOWING LEFT KNEE JOINT REPLACEMENT SURGERY: Primary | ICD-10-CM

## 2018-07-27 PROCEDURE — 99024 POSTOP FOLLOW-UP VISIT: CPT | Performed by: ORTHOPAEDIC SURGERY

## 2018-07-27 RX ORDER — ACETAMINOPHEN 500 MG
500 TABLET ORAL EVERY 6 HOURS PRN
COMMUNITY
End: 2018-09-07

## 2018-07-27 NOTE — PROGRESS NOTES
Six weeks following left total knee replacement, this patient describes swelling in his leg, and reducing pain in his left knee  Exam finds gait pattern without assist device  Left knee has a healed anterior incision  There is a small effusion  Extension is almost full, flexion is 110 degrees  There is no mid flexion valgus instability  There is no tenderness in the left calf  Assessment/plan:  6 weeks following left total knee replacement, this patient looks well  He will benefit from physical therapy to restore motion strength to his left knee   I would welcome the opportunity see him in 6 weeks time, this include x-rays two views left knee upon arrival for his 3 month checkup

## 2018-07-30 ENCOUNTER — OFFICE VISIT (OUTPATIENT)
Dept: PHYSICAL THERAPY | Facility: CLINIC | Age: 83
End: 2018-07-30
Payer: COMMERCIAL

## 2018-07-30 DIAGNOSIS — M17.12 PRIMARY OSTEOARTHRITIS OF LEFT KNEE: Primary | ICD-10-CM

## 2018-07-30 DIAGNOSIS — Z96.652 HISTORY OF TOTAL LEFT KNEE REPLACEMENT: ICD-10-CM

## 2018-07-30 DIAGNOSIS — M25.562 CHRONIC PAIN OF LEFT KNEE: ICD-10-CM

## 2018-07-30 DIAGNOSIS — G89.29 CHRONIC PAIN OF LEFT KNEE: ICD-10-CM

## 2018-07-30 PROCEDURE — 97140 MANUAL THERAPY 1/> REGIONS: CPT

## 2018-07-30 PROCEDURE — 97110 THERAPEUTIC EXERCISES: CPT

## 2018-07-30 NOTE — PROGRESS NOTES
Daily Note     Today's date: 2018  Patient name: Zoila Stevens  : 1935  MRN: [id number]  Referring provider: Miriam Acuna MD  Dx:   Encounter Diagnosis     ICD-10-CM    1  Primary osteoarthritis of left knee M17 12    2  Chronic pain of left knee M25 562     G89 29    3  History of total left knee replacement Z96 652                   Subjective: Pt states no new complaints  Pt anxious to get outside and do some yard work (ie  Trimming bushes)      Objective: See treatment diary below    Manual                   PROM (flexion seated, ext supine) 10'  10'  10'                                                                                                                       Exercise Diary                   bike 6' ROM 6' rom  6'                 Heel slides 10x10" 10x10"  10x10"                 SLR 3# 3x10 3# 3x10 3# 3x10                 Quad sets 10x10" 10x10"  10x10"                 LAQ 5# 3x10 5# 3x10 5# 3x10                 Leg press  110# 2x20 110# 2x20  120# 2x20                 Heel raises  3x10 3x10  3x10                 Standing hip abd 3# 3x10 3# 3x10 3# 3x10                 Standing hip ext 3# 3x10 3# 3x10 3# 3x10                 Step ups -lateral  np 6" x10  np                 Up and over   6" x20 6" x20 6" x20                                          SLS    10"x3  10"x3                                                                                                                                               Modalities                   CP PRN     10'  10'                                                                       Assessment: Tolerated treatment well  Patient would benefit from continued PT  Passive knee extension continues to be limited by pain, swelling  Group: [id number]      Plan: Progress treatment as tolerated

## 2018-08-02 ENCOUNTER — OFFICE VISIT (OUTPATIENT)
Dept: PHYSICAL THERAPY | Facility: CLINIC | Age: 83
End: 2018-08-02
Payer: COMMERCIAL

## 2018-08-02 DIAGNOSIS — Z96.652 HISTORY OF TOTAL LEFT KNEE REPLACEMENT: ICD-10-CM

## 2018-08-02 DIAGNOSIS — M17.12 PRIMARY OSTEOARTHRITIS OF LEFT KNEE: Primary | ICD-10-CM

## 2018-08-02 DIAGNOSIS — G89.29 CHRONIC PAIN OF LEFT KNEE: ICD-10-CM

## 2018-08-02 DIAGNOSIS — M25.562 CHRONIC PAIN OF LEFT KNEE: ICD-10-CM

## 2018-08-02 PROCEDURE — G8979 MOBILITY GOAL STATUS: HCPCS | Performed by: PHYSICAL THERAPIST

## 2018-08-02 PROCEDURE — G8978 MOBILITY CURRENT STATUS: HCPCS | Performed by: PHYSICAL THERAPIST

## 2018-08-02 PROCEDURE — 97110 THERAPEUTIC EXERCISES: CPT | Performed by: PHYSICAL THERAPIST

## 2018-08-02 NOTE — PROGRESS NOTES
Daily Note     Today's date: 2018  Patient name: Charito Carreon  : 1935  MRN: [id number]  Referring provider: Jeff Triplett MD  Dx:   Encounter Diagnosis     ICD-10-CM    1  Primary osteoarthritis of left knee M17 12    2  Chronic pain of left knee M25 562     G89 29    3  History of total left knee replacement Z96 652                   Subjective: Patient is about to leave Tuesday for [place] for 3 weeks  He feels pretty good and he has less heat and swelling in his leg  Objective: See treatment diary below  100 degrees knee flexion PROM today short sit    3 minutes no charge     Manual    8/2               PROM (flexion seated, ext supine) 10'  10'  10'  10'                                                                                                                     Exercise Diary  2               bike 6' ROM 6' rom  6'  6'               Heel slides 10x10" 10x10"  10x10"                 SLR 3# 3x10 3# 3x10 3# 3x10  4# 2x10               Quad sets 10x10" 10x10"  10x10"                 LAQ 5# 3x10 5# 3x10 5# 3x10  33# 2x10              Leg press  110# 2x20 110# 2x20  120# 2x20  120# 2x20               Heel raises  3x10 3x10  3x10 3x10                Standing hip abd 3# 3x10 3# 3x10 3# 3x10  4# 2x10               Standing hip ext 3# 3x10 3# 3x10 3# 3x10  4# 2x10               Step ups -lateral  np 6" x10  np np               Up and over   6" x20 6" x20 6" x20  8" x10                                        SLS    10"x3  10"x3  NP                                                                                                                                             Modalities    8/2               CP PRN     10'  10'  10'                                                                     Assessment: Tolerated treatment well  Patient would benefit from continued PT    Patient continues to be stiff at end range with flexion extension with pain at end range  He has slowly improving motion and is making continued progress at this time  Patient will sbe leaving for [place] for 3 weeks  Suggested patient continue therapy upon return as he will "work out on his ow n" while he is away  Plan: Progress note during next visit  Progress treatment as tolerated

## 2018-08-06 ENCOUNTER — OFFICE VISIT (OUTPATIENT)
Dept: PHYSICAL THERAPY | Facility: CLINIC | Age: 83
End: 2018-08-06
Payer: COMMERCIAL

## 2018-08-06 DIAGNOSIS — M25.562 CHRONIC PAIN OF LEFT KNEE: ICD-10-CM

## 2018-08-06 DIAGNOSIS — M17.12 PRIMARY OSTEOARTHRITIS OF LEFT KNEE: Primary | ICD-10-CM

## 2018-08-06 DIAGNOSIS — Z96.652 HISTORY OF TOTAL LEFT KNEE REPLACEMENT: ICD-10-CM

## 2018-08-06 DIAGNOSIS — G89.29 CHRONIC PAIN OF LEFT KNEE: ICD-10-CM

## 2018-08-06 PROCEDURE — 97140 MANUAL THERAPY 1/> REGIONS: CPT | Performed by: PHYSICAL THERAPIST

## 2018-08-06 PROCEDURE — 97110 THERAPEUTIC EXERCISES: CPT | Performed by: PHYSICAL THERAPIST

## 2018-08-06 NOTE — PROGRESS NOTES
Daily Note     Today's date: 2018  Patient name: Girma Livingston  : 1935  MRN: [id number]  Referring provider: Jaki Skinner MD  Dx:   Encounter Diagnosis     ICD-10-CM    1  Primary osteoarthritis of left knee M17 12    2  Chronic pain of left knee M25 562     G89 29    3  History of total left knee replacement Z96 652                   Subjective: Patient notes he has been feeling pretty good  He will be away in [facility] for the next 3 weeks and will continue with PT upon return  Objective: See treatment diary below  20 minutes no charge     Manual               PROM (flexion seated, ext supine) 10'  10'  10'  10'  10'                                                                                                                   Exercise Diary               bike 6' ROM 6' rom  6'  6'  6'             Heel slides 10x10" 10x10"  10x10"    10x10"             SLR 3# 3x10 3# 3x10 3# 3x10  4# 2x10  NP             Quad sets 10x10" 10x10"  10x10"    NP             LAQ 5# 3x10 5# 3x10 5# 3x10  33# 2x10 44# 3x10              Ham curl machine      44# 4x10        Leg press  110# 2x20 110# 2x20  120# 2x20  120# 2x20  130#                Heel raises  3x10 3x10  3x10 3x10   3x10              Standing hip abd 3# 3x10 3# 3x10 3# 3x10  4# 2x10  5# 2x10             Standing hip ext 3# 3x10 3# 3x10 3# 3x10  4# 2x10  5# 2x10              Step ups -lateral  np 6" x10  np np  np             Up and over   6" x20 6" x20 6" x20  8" x10  8" x10                                       SLS    10"x3  10"x3  NP                                                                                                                                             Modalities  6             CP PRN     10'  10'  10'  10'                                                                   Assessment: Tolerated treatment well  Patient would benefit from continued PT    Patient tolerated session well  He continues to require cues for safety and proper form with stairs, especially on descent  He has increased tightness in knee into flexion and extension  Patient will be away for ~3 weeks and wishes to continue PT upon return  Plan: Progress treatment as tolerated

## 2018-08-27 ENCOUNTER — APPOINTMENT (OUTPATIENT)
Dept: PHYSICAL THERAPY | Facility: CLINIC | Age: 83
End: 2018-08-27
Payer: COMMERCIAL

## 2018-08-30 ENCOUNTER — APPOINTMENT (OUTPATIENT)
Dept: PHYSICAL THERAPY | Facility: CLINIC | Age: 83
End: 2018-08-30
Payer: COMMERCIAL

## 2018-09-04 ENCOUNTER — OFFICE VISIT (OUTPATIENT)
Dept: PHYSICAL THERAPY | Facility: CLINIC | Age: 83
End: 2018-09-04
Payer: COMMERCIAL

## 2018-09-04 DIAGNOSIS — M17.12 PRIMARY OSTEOARTHRITIS OF LEFT KNEE: Primary | ICD-10-CM

## 2018-09-04 DIAGNOSIS — Z96.652 HISTORY OF TOTAL LEFT KNEE REPLACEMENT: ICD-10-CM

## 2018-09-04 DIAGNOSIS — G89.29 CHRONIC PAIN OF LEFT KNEE: ICD-10-CM

## 2018-09-04 DIAGNOSIS — M25.562 CHRONIC PAIN OF LEFT KNEE: ICD-10-CM

## 2018-09-04 PROCEDURE — 97140 MANUAL THERAPY 1/> REGIONS: CPT

## 2018-09-04 PROCEDURE — 97110 THERAPEUTIC EXERCISES: CPT

## 2018-09-04 PROCEDURE — G8978 MOBILITY CURRENT STATUS: HCPCS

## 2018-09-04 PROCEDURE — G8979 MOBILITY GOAL STATUS: HCPCS

## 2018-09-04 NOTE — PROGRESS NOTES
PT Re-Evaluation    Daily Note     Today's date: 2018  Patient name: Juno Swan  : 1935  MRN: [id number]  Referring provider: Ailyn Ramos MD  Dx:   Encounter Diagnosis     ICD-10-CM    1  Primary osteoarthritis of left knee M17 12 PT plan of care cert/re-cert   2  Chronic pain of left knee M25 562 PT plan of care cert/re-cert    J35 74    3  History of total left knee replacement Z96 652 PT plan of care cert/re-cert                  Subjective: See RE for details  Objective: See treatment diary below           Manual             PROM (flexion seated, ext supine) 10'  10'  10'  10'  10'  10'                                                                                                                 Exercise Diary             bike 6' ROM 6' rom  6'  6'  6'  6'           Heel slides 10x10" 10x10"  10x10"    10x10"  np           SLR 3# 3x10 3# 3x10 3# 3x10  4# 2x10  NP NV           Quad sets 10x10" 10x10"  10x10"    NP np           LAQ 5# 3x10 5# 3x10 5# 3x10  33# 2x10 44# 3x10  44# 2x10           Ham curl machine      44# 4x10 44# 2x10       Leg press  110# 2x20 110# 2x20  120# 2x20  120# 2x20  130#    120# 2x20  130#         Heel raises  3x10 3x10  3x10 3x10   3x10   np           Standing hip abd 3# 3x10 3# 3x10 3# 3x10  4# 2x10  5# 2x10 5# 2x10 ea           Standing hip ext 3# 3x10 3# 3x10 3# 3x10  4# 2x10  5# 2x10  5# 2x10ea           Step ups -lateral  np 6" x10  np np  np  np           Up and over   6" x20 6" x20 6" x20  8" x10  8" x10  8" x20                                    SLS    10"x3  10"x3  NP    np                                                                                                                                         modalities                10'  10'  10'  10'  np                                                                 Assessment: Tolerates tx fairly well; increased discomfort w/ passive knee extension  Min cueing needed w/ TE for proper technique  Plan: Progress treatment as tolerated  Today's date: 2018  Patient name: Estela Neal  : 1935  MRN: [id number]  Referring provider: Tanvir Mackenzie MD  Dx:   Encounter Diagnosis     ICD-10-CM    1  Primary osteoarthritis of left knee M17 12    2  Chronic pain of left knee M25 562     G89 29    3  History of total left knee replacement Z96 652                   Assessment  Impairments: abnormal gait, abnormal muscle tone, abnormal or restricted ROM, abnormal movement, activity intolerance, impaired balance, impaired physical strength, lacks appropriate home exercise program, pain with function and weight-bearing intolerance    Assessment details: Patient has made slow progression in ROM and strength since his last evaluation  Patient had 3 week hiatus with therapy visits due to [place]  He continues to have ROM and mobility as well as functional deficits in L LE and would benefit from continued physical thearpy in order to address said deficits and improve functional mobility     Understanding of Dx/Px/POC: good   Prognosis: good    Goals  Increase knee flexion and extension > 10 degrees -met  Increase knee flexion to 110 degrees -partially met  Increase LE strength to 4/5-met  Patient will sit to stand without use of UE-partially met   Patient will be independent with HEP-partially met  Patient will be able to walk > 15 minutes -met  Patient will ambulate safely without AD-met    Plan  Patient would benefit from: skilled physical therapy  Planned therapy interventions: therapeutic activities, therapeutic exercise, therapeutic training, manual therapy, home exercise program, strengthening, flexibility, graded exercise, patient education, neuromuscular re-education and balance  Frequency: 2x week  Duration in weeks: 4  Treatment plan discussed with: patient        Subjective Evaluation    History of Present Illness  Date of surgery: 2018  Mechanism of injury: Patient notes he is doing better and was away  He is going up stairs step over step, and going down step to currently for safety  Not using any AD       Quality of life: good    Pain  Current pain ratin  At worst pain ratin  Quality: dull ache and throbbing  Relieving factors: rest  Aggravating factors: walking and standing  Progression: improved    Social Support  Stairs in house: yes     Treatments  Current treatment: medication  Discharged from (in last 30 days): inpatient hospitalization  Patient Goals  Patient goals for therapy: increased strength, independence with ADLs/IADLs, improved balance, decreased pain, decreased edema and increased motion  Patient goal: walk up to 3 miles a day         Objective     Active Range of Motion   Left Knee   Flexion: 104 degrees   Extension: 12 degrees     Right Knee   Flexion: 119 degrees   Extension: 0 degrees     Passive Range of Motion   Left Knee   Flexion: 109 degrees   Extension: 7 degrees     Additional Passive Range of Motion Details  98 degrees short sit position with knee flexion PROM    Strength/Myotome Testing     Left Knee   Flexion: 5  Extension: 5    Additional Strength Details  L hip flexion 5-      General Comments     Knee Comments  5TSTS: 29 93sec    TU 43 sec          Precautions: None

## 2018-09-06 ENCOUNTER — EVALUATION (OUTPATIENT)
Dept: PHYSICAL THERAPY | Facility: CLINIC | Age: 83
End: 2018-09-06
Payer: COMMERCIAL

## 2018-09-06 DIAGNOSIS — M25.562 CHRONIC PAIN OF LEFT KNEE: ICD-10-CM

## 2018-09-06 DIAGNOSIS — Z96.652 HISTORY OF TOTAL LEFT KNEE REPLACEMENT: ICD-10-CM

## 2018-09-06 DIAGNOSIS — M17.12 PRIMARY OSTEOARTHRITIS OF LEFT KNEE: Primary | ICD-10-CM

## 2018-09-06 DIAGNOSIS — G89.29 CHRONIC PAIN OF LEFT KNEE: ICD-10-CM

## 2018-09-06 PROCEDURE — 97140 MANUAL THERAPY 1/> REGIONS: CPT

## 2018-09-06 PROCEDURE — 97110 THERAPEUTIC EXERCISES: CPT

## 2018-09-07 ENCOUNTER — APPOINTMENT (OUTPATIENT)
Dept: RADIOLOGY | Facility: MEDICAL CENTER | Age: 83
End: 2018-09-07
Payer: COMMERCIAL

## 2018-09-07 ENCOUNTER — OFFICE VISIT (OUTPATIENT)
Dept: OBGYN CLINIC | Facility: MEDICAL CENTER | Age: 83
End: 2018-09-07

## 2018-09-07 VITALS
SYSTOLIC BLOOD PRESSURE: 139 MMHG | HEIGHT: 70 IN | WEIGHT: 280 LBS | BODY MASS INDEX: 40.09 KG/M2 | HEART RATE: 89 BPM | DIASTOLIC BLOOD PRESSURE: 81 MMHG

## 2018-09-07 DIAGNOSIS — Z47.1 AFTERCARE FOLLOWING LEFT KNEE JOINT REPLACEMENT SURGERY: ICD-10-CM

## 2018-09-07 DIAGNOSIS — Z96.652 AFTERCARE FOLLOWING LEFT KNEE JOINT REPLACEMENT SURGERY: Primary | ICD-10-CM

## 2018-09-07 DIAGNOSIS — Z96.652 AFTERCARE FOLLOWING LEFT KNEE JOINT REPLACEMENT SURGERY: ICD-10-CM

## 2018-09-07 DIAGNOSIS — Z47.1 AFTERCARE FOLLOWING LEFT KNEE JOINT REPLACEMENT SURGERY: Primary | ICD-10-CM

## 2018-09-07 PROCEDURE — 73560 X-RAY EXAM OF KNEE 1 OR 2: CPT

## 2018-09-07 PROCEDURE — 99024 POSTOP FOLLOW-UP VISIT: CPT | Performed by: ORTHOPAEDIC SURGERY

## 2018-09-07 RX ORDER — CEPHALEXIN 250 MG/1
500 CAPSULE ORAL EVERY 6 HOURS SCHEDULED
Qty: 40 CAPSULE | Refills: 0 | Status: SHIPPED | OUTPATIENT
Start: 2018-09-07 | End: 2018-09-17

## 2018-09-07 NOTE — PROGRESS NOTES
Subjective;    47-year-old male patient 3 months status post total knee replacement left knee  Patient comes the office accompanied by his wife  He is doing physical therapy to improve range of motion and function  He has to irregularly shaped area is on his anterior shin that are causing draining and slow to heal   He is using a he dressing to control the drainage,   In a gentleman who has no described fever or chills    Past Medical History:   Diagnosis Date    Hypertension     Sleep apnea     no CPAP        Past Surgical History:   Procedure Laterality Date    CARPAL TUNNEL RELEASE      [place] ARTHROCENTESIS ASPIR&/INJ MAJOR JT/BURSA W/O US  6/14/2018    Procedure: ASPIRATION RIGHT KNEE BURSA; Surgeon: Priscilla Babinski, MD;  [facility];  Service: Orthopedics    [place] TOTAL KNEE ARTHROPLASTY Left 6/14/2018    Procedure: ARTHROPLASTY KNEE TOTAL;  Surgeon: Priscilla Babinski, MD;  [facility];  Service: Orthopedics       No family history on file  Social History   Substance Use Topics    Smoking status: Former Smoker    Smokeless tobacco: Never Used      Comment: quit > 40 years ago     Alcohol use No     Exam;    The patient has a well-healed incision overlying the left knee  The patient has superficial defect in the anterior shin x2 limited the less than 2 cm in diameter and with clear serous drainage drops are seen at this time  He has an extension lag of approximately 10°, left knee  He has flexion well past 90°  His left knee exhibits no mid flexion arc instability    X-rays; I personally reviewed the studies with patient  Left knee series shows left total knee components in excellent position    Impression; Three months status post left total knee replacement  Superficial skin drainage left anterior shin    Plan; He was provided a non adherent dressing i e  a Telfa dressing to applied to the shin    He is asked not to apply antibiotic ointment or any other substance that keeps the skin surface moist   He was given a prescription for 10 days of antibiotics to dry this out  We will see him in follow-up to ascertain his improved range of motion improvement in extension and improvement the anterior shin defects    His exam was provided by and plan formulated by the attending surgeon

## 2018-09-10 ENCOUNTER — TELEPHONE (OUTPATIENT)
Dept: OBGYN CLINIC | Facility: HOSPITAL | Age: 83
End: 2018-09-10

## 2018-09-10 ENCOUNTER — OFFICE VISIT (OUTPATIENT)
Dept: PHYSICAL THERAPY | Facility: CLINIC | Age: 83
End: 2018-09-10
Payer: COMMERCIAL

## 2018-09-10 DIAGNOSIS — G89.29 CHRONIC PAIN OF LEFT KNEE: ICD-10-CM

## 2018-09-10 DIAGNOSIS — M25.562 CHRONIC PAIN OF LEFT KNEE: ICD-10-CM

## 2018-09-10 DIAGNOSIS — M17.12 PRIMARY OSTEOARTHRITIS OF LEFT KNEE: Primary | ICD-10-CM

## 2018-09-10 DIAGNOSIS — Z96.652 HISTORY OF TOTAL LEFT KNEE REPLACEMENT: ICD-10-CM

## 2018-09-10 PROCEDURE — 97110 THERAPEUTIC EXERCISES: CPT

## 2018-09-10 PROCEDURE — 97140 MANUAL THERAPY 1/> REGIONS: CPT

## 2018-09-10 NOTE — TELEPHONE ENCOUNTER
Caller: patient  Call back number: [phone]  Patient's doctor: Dr Candy Carranza    Patient called stating that the script for keflex can not be filled until the pharmacy gets the directions  Can you please call then and assist? Thank you!

## 2018-09-11 NOTE — TELEPHONE ENCOUNTER
The prescription for cephalexin should read:  Cephalexin 500 mg  One tab p o  Q 6 hours for 10 days  Number 40, no refills  Please call patient and advise when completed    Thank you

## 2018-09-11 NOTE — TELEPHONE ENCOUNTER
[facility]  This is Dr Luther Leos patient  Please talk to [facility]  It is usually 1 tab q 6 for 7 days  I tried calling patient but it went to voice mail(i did not leave a message)    nahun

## 2018-09-13 ENCOUNTER — OFFICE VISIT (OUTPATIENT)
Dept: PHYSICAL THERAPY | Facility: CLINIC | Age: 83
End: 2018-09-13
Payer: COMMERCIAL

## 2018-09-13 DIAGNOSIS — M17.12 PRIMARY OSTEOARTHRITIS OF LEFT KNEE: Primary | ICD-10-CM

## 2018-09-13 DIAGNOSIS — M25.562 CHRONIC PAIN OF LEFT KNEE: ICD-10-CM

## 2018-09-13 DIAGNOSIS — G89.29 CHRONIC PAIN OF LEFT KNEE: ICD-10-CM

## 2018-09-13 DIAGNOSIS — Z96.652 HISTORY OF TOTAL LEFT KNEE REPLACEMENT: ICD-10-CM

## 2018-09-13 PROCEDURE — 97110 THERAPEUTIC EXERCISES: CPT

## 2018-09-13 PROCEDURE — 97140 MANUAL THERAPY 1/> REGIONS: CPT

## 2018-09-13 NOTE — PROGRESS NOTES
Daily Note     Today's date: 2018  Patient name: Armin Peñaloza  : 1935  MRN: [id number]  Referring provider: Fay Garvin MD  Dx:   Encounter Diagnosis     ICD-10-CM    1  Primary osteoarthritis of left knee M17 12    2  Chronic pain of left knee M25 562     G89 29    3  History of total left knee replacement Z96 652                   Subjective: Pt states he lower leg abrasion is "healing" and states he has started the antibiotics        Objective: See treatment diary below  Manual  7/23 7/26 7/30  8/2  8/6  9/4  9/6  9/10  9/13     PROM (flexion seated, ext supine) 10'  10'  10'  10'  10'  10'  10'  10'  10'                                                                                                           Exercise Diary  7/23  7/26  7/30  8/2  8/6 9/4  9/6  9/10  9/13     bike 6' ROM 6' rom  6'  6'  6'  6'  6'  6'  6'     Heel slides 10x10" 10x10"  10x10"    10x10"  np           SLR 3# 3x10 3# 3x10 3# 3x10  4# 2x10  NP NV  4# 3x10 4# 3x10  4# 3x10     Quad sets 10x10" 10x10"  10x10"    NP np           LAQ 5# 3x10 5# 3x10 5# 3x10  33# 2x10 44# 3x10  44# 2x10 44# 3x10 44# 3x10  44# 3x10     Ham curl machine          44# 4x10 44# 2x10 44# 3x10 44# 3x10  44#  3x10     Leg press  110# 2x20 110# 2x20  120# 2x20  120# 2x20  130#     120# 2x20  130# 3x10 130# 3x10  130# 3x10     Heel raises  3x10 3x10  3x10 3x10   3x10   np  np  np  np     Standing hip abd 3# 3x10 3# 3x10 3# 3x10  4# 2x10  5# 2x10 5# 2x10 ea 5# 3x10 ea 5# 3x10 ea  5# 3x10 ea     Standing hip ext 3# 3x10 3# 3x10 3# 3x10  4# 2x10  5# 2x10  5# 2x10ea 5# 3x10 ea 5# 3x10 ea  5# 3x10 ea     Step ups -lateral  np 6" x10  np np  np  np  np  np  np     Up and over   6" x20 6" x20 6" x20  8" x10  8" x10  8" x20 8" x20 8" x20  8" 20x                              SLS    10"x3  10"x3  NP    np  15"x3 15"x3  15"x3                                                                                                                                 modalities 7/23  7/26  7/30  8/2  8/6  9/4 9/6 9/10 9/13      ice    10'  10'  10'  10'  np  10'  8'  10'                                                            Assessment: Tolerated treatment well  Patient would benefit from continued PT  Pt most challenged by SLS  Min discomfort noted w/ passive knee extension  Plan: Progress treatment as tolerated

## 2018-09-17 ENCOUNTER — OFFICE VISIT (OUTPATIENT)
Dept: PHYSICAL THERAPY | Facility: CLINIC | Age: 83
End: 2018-09-17
Payer: COMMERCIAL

## 2018-09-17 DIAGNOSIS — G89.29 CHRONIC PAIN OF LEFT KNEE: ICD-10-CM

## 2018-09-17 DIAGNOSIS — Z96.652 HISTORY OF TOTAL LEFT KNEE REPLACEMENT: ICD-10-CM

## 2018-09-17 DIAGNOSIS — M25.562 CHRONIC PAIN OF LEFT KNEE: ICD-10-CM

## 2018-09-17 DIAGNOSIS — M17.12 PRIMARY OSTEOARTHRITIS OF LEFT KNEE: Primary | ICD-10-CM

## 2018-09-17 PROCEDURE — 97110 THERAPEUTIC EXERCISES: CPT

## 2018-09-17 PROCEDURE — 97140 MANUAL THERAPY 1/> REGIONS: CPT

## 2018-09-17 NOTE — PROGRESS NOTES
Daily Note     Today's date: 2018  Patient name: Kaitlynn Brothers  : 1935  MRN: [id number]  Referring provider: Michele Mtz MD  Dx:   Encounter Diagnosis     ICD-10-CM    1  Primary osteoarthritis of left knee M17 12    2  Chronic pain of left knee M25 562     G89 29    3  History of total left knee replacement Z96 652                   Subjective: Pt states it usually takes him "a day to recover" following PT sessions          Objective: See treatment diary below  Manual  7/23 7/26 7/30  8/2  8/6  9/4  9/6  9/10  9/13  9/17   PROM (flexion seated, ext supine) 10'  10'  10'  10'  10'  10'  10'  10'  10'  10'                                                                                                         Exercise Diary  7/23  7/26  7/30  8/2  8/6 9/4  9/6  9/10  9/13  9/17   bike 6' ROM 6' rom  6'  6'  6'  6'  6'  6'  6'  6'   Heel slides 10x10" 10x10"  10x10"    10x10"  np           SLR 3# 3x10 3# 3x10 3# 3x10  4# 2x10  NP NV  4# 3x10 4# 3x10  4# 3x10  4# 3x10   Quad sets 10x10" 10x10"  10x10"    NP np           LAQ 5# 3x10 5# 3x10 5# 3x10  33# 2x10 44# 3x10  44# 2x10 44# 3x10 44# 3x10  44# 3x10  44# 3x10   Ham curl machine          44# 4x10 44# 2x10 44# 3x10 44# 3x10  44#  3x10  44#  3x10   Leg press  110# 2x20 110# 2x20  120# 2x20  120# 2x20  130#     120# 2x20  130# 3x10 130# 3x10  130# 3x10  130#    Heel raises  3x10 3x10  3x10 3x10   3x10   np  np  np  np  np   Standing hip abd 3# 3x10 3# 3x10 3# 3x10  4# 2x10  5# 2x10 5# 2x10 ea 5# 3x10 ea 5# 3x10 ea  5# 3x10 ea  5# 3x10 ea   Standing hip ext 3# 3x10 3# 3x10 3# 3x10  4# 2x10  5# 2x10  5# 2x10ea 5# 3x10 ea 5# 3x10 ea  5# 3x10 ea  5#  3x10 ea   Step ups -lateral  np 6" x10  np np  np  np  np  np  np  np   Up and over   6" x20 6" x20 6" x20  8" x10  8" x10  8" x20 8" x20 8" x20  8" 20x 30x, 8"                            SLS    10"x3  10"x3  NP    np  15"x3 15"x3  15"x3  15"x3                                                                                                                                 modalities 7/23  7/26  7/30  8/2  8/6  9/4 9/6 9/10 9/13  9/17    ice    10'  10'  10'  10'  np  10'  8'  10'  6'                                                          Assessment: Tolerated treatment well  Patient exhibited good technique with therapeutic exercises  Pt performs TE quickly today; fatigue noted by the end of tx session  Knee extension continues to be limited; however, tolerates manual stretching well  Plan: Progress treatment as tolerated

## 2018-09-21 ENCOUNTER — OFFICE VISIT (OUTPATIENT)
Dept: OBGYN CLINIC | Facility: MEDICAL CENTER | Age: 83
End: 2018-09-21
Payer: COMMERCIAL

## 2018-09-21 VITALS
WEIGHT: 280 LBS | HEIGHT: 70 IN | HEART RATE: 74 BPM | SYSTOLIC BLOOD PRESSURE: 139 MMHG | DIASTOLIC BLOOD PRESSURE: 76 MMHG | BODY MASS INDEX: 40.09 KG/M2

## 2018-09-21 DIAGNOSIS — Z96.652 AFTERCARE FOLLOWING LEFT KNEE JOINT REPLACEMENT SURGERY: Primary | ICD-10-CM

## 2018-09-21 DIAGNOSIS — Z47.1 AFTERCARE FOLLOWING LEFT KNEE JOINT REPLACEMENT SURGERY: Primary | ICD-10-CM

## 2018-09-21 PROCEDURE — 99213 OFFICE O/P EST LOW 20 MIN: CPT | Performed by: ORTHOPAEDIC SURGERY

## 2018-09-21 RX ORDER — CEPHALEXIN 500 MG/1
CAPSULE ORAL
COMMUNITY
Start: 2018-09-10 | End: 2018-12-07

## 2018-09-21 NOTE — PROGRESS NOTES
Subjective;    82-year-old adult male known to the practice  He is status post left total knee replacement and had skin redness and irritation of his pretibial area and unrelated presentation  Because of this he was placed on p o  antibiotics and is brought back today in follow-up to ascertain his progress  He is accompanied by his wife at today's visi he relates no fever chills he has a good appetite and no problems with elimination  Past Medical History:   Diagnosis Date    Hypertension     Sleep apnea     no CPAP        Past Surgical History:   Procedure Laterality Date    CARPAL TUNNEL RELEASE      [place] ARTHROCENTESIS ASPIR&/INJ MAJOR JT/BURSA W/O US  6/14/2018    Procedure: ASPIRATION RIGHT KNEE BURSA; Surgeon: Tsering Yao MD;  [facility];  Service: Orthopedics    [place] TOTAL KNEE ARTHROPLASTY Left 6/14/2018    Procedure: ARTHROPLASTY KNEE TOTAL;  Surgeon: Tsering Yao MD;  [facility];  Service: Orthopedics       No family history on file  Social History   Substance Use Topics    Smoking status: Former Smoker    Smokeless tobacco: Never Used      Comment: quit > 40 years ago     Alcohol use No     Exam;    He has well-healed vertical incision overlying his left knee  His left knee range of motion is very well he has a subtle extension lag a 3-5 degrees he has flexion well past 90° he has no mid flexion arc instability  His anterior tibial defect mid to distal portion of his calf is now without any erythema it has resolved the distal most defect in the skin has closed the upper 1 or proximal area of 2 cm diameter has yet to close is slightly moist and still requires a Band-Aid    Impression; History of left total knee replacement  Care of pretibial defect    Plan; We will see him in follow-up in 3 additional months  In the interim he was instructed to use the Band-Aids liberally avoid drainage,   And we anticipate closure of this pretibial wound   His exam was provided by and plan formulated by the attending surgeon it was my privilege to assist him in its delivery

## 2018-11-15 ENCOUNTER — TELEPHONE (OUTPATIENT)
Dept: OBGYN CLINIC | Facility: HOSPITAL | Age: 83
End: 2018-11-15

## 2018-11-15 NOTE — TELEPHONE ENCOUNTER
Don't forget Keflex RX  They are headed to [place] over winter and may see a dentist there  Has follow up appt at dentis here in [place] in 2 weeks

## 2018-11-15 NOTE — TELEPHONE ENCOUNTER
Patient's wife advised that he will need to take Keflex 4 500mg capsules 1 hr prior to procedure  Patient does not have any allergies  I tried to call the Dental office but they are closed until Monday  Please forward this message back to me and I will call dentist on 11/20  Patient will need RX for Keflex dental proph  Sent to pharmacy on file

## 2018-11-15 NOTE — TELEPHONE ENCOUNTER
I have read the above note, and agree with recommendations  No further action is required   Thank you

## 2018-11-15 NOTE — TELEPHONE ENCOUNTER
I reviewed your message and the patient's request    Please check the order for Keflex 21 September 2018,   which was already prescribed

## 2018-11-15 NOTE — TELEPHONE ENCOUNTER
Dr Brando Tam called wanted to know if patient should be pre medicated before dental procedure, patient was seen today 11/15/18 at dental office  Patients had total left knee surgery on 06/14/18 please advise thanks           Call back# [phone]  Dr Jazmine Jc

## 2018-11-16 DIAGNOSIS — Z47.1 AFTERCARE FOLLOWING JOINT REPLACEMENT SURGERY, UNSPECIFIED JOINT: Primary | ICD-10-CM

## 2018-11-16 RX ORDER — CEPHALEXIN 500 MG/1
CAPSULE ORAL
Qty: 12 CAPSULE | Refills: 3 | Status: SHIPPED | OUTPATIENT
Start: 2018-11-16 | End: 2018-11-30

## 2018-11-16 NOTE — TELEPHONE ENCOUNTER
I checked back with patient and he states that the keflex that was given was for his wound and he does not have any left  He will need a new RX for Keflex sent to his pharmacy  Thanks

## 2018-12-07 ENCOUNTER — APPOINTMENT (OUTPATIENT)
Dept: RADIOLOGY | Facility: MEDICAL CENTER | Age: 83
End: 2018-12-07
Payer: COMMERCIAL

## 2018-12-07 ENCOUNTER — OFFICE VISIT (OUTPATIENT)
Dept: OBGYN CLINIC | Facility: MEDICAL CENTER | Age: 83
End: 2018-12-07
Payer: COMMERCIAL

## 2018-12-07 VITALS — BODY MASS INDEX: 40.09 KG/M2 | HEIGHT: 70 IN | WEIGHT: 280 LBS

## 2018-12-07 DIAGNOSIS — Z96.652 AFTERCARE FOLLOWING LEFT KNEE JOINT REPLACEMENT SURGERY: Primary | ICD-10-CM

## 2018-12-07 DIAGNOSIS — Z96.652 AFTERCARE FOLLOWING LEFT KNEE JOINT REPLACEMENT SURGERY: ICD-10-CM

## 2018-12-07 DIAGNOSIS — Z47.1 AFTERCARE FOLLOWING LEFT KNEE JOINT REPLACEMENT SURGERY: ICD-10-CM

## 2018-12-07 DIAGNOSIS — Z47.1 AFTERCARE FOLLOWING LEFT KNEE JOINT REPLACEMENT SURGERY: Primary | ICD-10-CM

## 2018-12-07 PROCEDURE — 99213 OFFICE O/P EST LOW 20 MIN: CPT | Performed by: ORTHOPAEDIC SURGERY

## 2018-12-07 PROCEDURE — 73560 X-RAY EXAM OF KNEE 1 OR 2: CPT

## 2018-12-07 RX ORDER — CEPHALEXIN 500 MG/1
CAPSULE ORAL
Qty: 60 CAPSULE | Refills: 1 | Status: SHIPPED | OUTPATIENT
Start: 2018-12-07 | End: 2019-01-07

## 2018-12-07 NOTE — PROGRESS NOTES
Assessment:  1  Aftercare following left knee joint replacement surgery  XR knee 1 or 2 vw left    cephalexin (KEFLEX) 500 mg capsule       Plan:  Diagnostics reviewed and physical exam performed  Diagnosis, treatment options and associated risks were discussed with the patient  The patient was given the opportunity to ask questions regarding each  Six months removed left total knee arthroplasty with her dramatic reduction of his pain  Patient will be weight-bearing and activities as tolerated  Cephalexin sent to his pharmacy as per protocol for the next year and a half  Maintain exercise routine with stationary bicycle or elliptical machine  Avoid aggressive scrubbing on his left lower extremity healing wound  To do next visit:  Return in about 6 months (around 6/7/2019) for re-check with x-rays  The above stated was discussed in layman's terms and the patient expressed understanding  All questions were answered to the patient's satisfaction  Scribe Attestation    I,:   Kristina Grace am acting as a scribe while in the presence of the attending physician :        I,:   Adair Rogers MD personally performed the services described in this documentation    as scribed in my presence :              Subjective:   Mami Downey is a [de-identified] y o  male who presents repeat evaluation 6 months status post left total knee arthroplasty  He reports today that he has no pain at his knee  They are planning to leave for [place] for the next several months  He has been treating with Jenaro in hospital wound care for a healing ulcer at his left lower extremity just proximal to his ankle joint    He is currently not taking any oral antibiotics      Review of systems negative unless otherwise specified in HPI    Past Medical History:   Diagnosis Date    Hypertension     Sleep apnea     no CPAP        Past Surgical History:   Procedure Laterality Date    CARPAL TUNNEL RELEASE      [place] ARTHROCENTESIS ASPIR&/INJ MAJOR JT/BURSA W/O US  6/14/2018    Procedure: ASPIRATION RIGHT KNEE BURSA; Surgeon: Anisa Fernandez MD;  [facility];  Service: Orthopedics    [place] TOTAL KNEE ARTHROPLASTY Left 6/14/2018    Procedure: ARTHROPLASTY KNEE TOTAL;  Surgeon: Anisa Fernandez MD;  [facility];  Service: Orthopedics       History reviewed  No pertinent family history  Social History     Occupational History    Not on file  Social History Main Topics    Smoking status: Former Smoker    Smokeless tobacco: Never Used      Comment: quit > 40 years ago     Alcohol use No    Drug use: No    Sexual activity: Not on file         Current Outpatient Prescriptions:     aspirin 81 mg chewable tablet, Chew, Disp: , Rfl:     lisinopril-hydrochlorothiazide (PRINZIDE,ZESTORETIC) 20-25 MG per tablet, Take 1 tablet by mouth daily, Disp: , Rfl:     LUMIGAN 0 01 % ophthalmic drops, , Disp: , Rfl:     No Known Allergies         Vitals:       Objective:          Physical Exam                    Left Knee Exam     Tenderness   The patient is experiencing no tenderness  Range of Motion   The patient has normal left knee ROM  Left knee flexion: Patella tracks well without patellar instability  Muscle Strength     The patient has normal left knee strength  Tests   Varus: negative  Valgus: negative    Other   Erythema: absent  Sensation: normal  Swelling: none  Effusion: no effusion present    Comments:    Well-healed anterior incision without evidence of infection  No warmth or erythema  Calf is soft and nontender no signs of DVT  Extensor mechanism intact  Neutral alignment  There is a bandage on his left lower extremity which was removed in which the patient is/was being treated for a healing ulcer  Diagnostics, reviewed and taken today if performed as documented:     The attending physician has personally reviewed the pertinent films in PACS and interpretation is as follows:    Left knee x-rays taken reviewed the office today show:  Well placed and positioned total knee prosthesis that is well bonded without evidence of loosening  No fractures  Procedures, if performed today:    Procedures    None performed      Portions of the record may have been created with voice recognition software   Occasional wrong word or "sound a like" substitutions may have occurred due to the inherent limitations of voice recognition software   Read the chart carefully and recognize, using context, where substitutions have occurred

## 2019-06-21 ENCOUNTER — APPOINTMENT (OUTPATIENT)
Dept: RADIOLOGY | Facility: MEDICAL CENTER | Age: 84
End: 2019-06-21
Payer: COMMERCIAL

## 2019-06-21 ENCOUNTER — OFFICE VISIT (OUTPATIENT)
Dept: OBGYN CLINIC | Facility: MEDICAL CENTER | Age: 84
End: 2019-06-21
Payer: COMMERCIAL

## 2019-06-21 VITALS
HEIGHT: 70 IN | BODY MASS INDEX: 42.66 KG/M2 | HEART RATE: 60 BPM | WEIGHT: 298 LBS | DIASTOLIC BLOOD PRESSURE: 76 MMHG | SYSTOLIC BLOOD PRESSURE: 130 MMHG

## 2019-06-21 DIAGNOSIS — M25.562 CHRONIC PAIN OF LEFT KNEE: ICD-10-CM

## 2019-06-21 DIAGNOSIS — Z96.652 AFTERCARE FOLLOWING LEFT KNEE JOINT REPLACEMENT SURGERY: ICD-10-CM

## 2019-06-21 DIAGNOSIS — M17.12 PRIMARY OSTEOARTHRITIS OF LEFT KNEE: ICD-10-CM

## 2019-06-21 DIAGNOSIS — G89.29 CHRONIC PAIN OF LEFT KNEE: ICD-10-CM

## 2019-06-21 DIAGNOSIS — Z96.652 STATUS POST TOTAL LEFT KNEE REPLACEMENT: Primary | ICD-10-CM

## 2019-06-21 DIAGNOSIS — Z47.1 AFTERCARE FOLLOWING LEFT KNEE JOINT REPLACEMENT SURGERY: ICD-10-CM

## 2019-06-21 PROCEDURE — 99213 OFFICE O/P EST LOW 20 MIN: CPT | Performed by: ORTHOPAEDIC SURGERY

## 2019-06-21 PROCEDURE — 73560 X-RAY EXAM OF KNEE 1 OR 2: CPT

## 2019-08-23 ENCOUNTER — OFFICE VISIT (OUTPATIENT)
Dept: OBGYN CLINIC | Facility: MEDICAL CENTER | Age: 84
End: 2019-08-23
Payer: COMMERCIAL

## 2019-08-23 ENCOUNTER — APPOINTMENT (OUTPATIENT)
Dept: RADIOLOGY | Facility: MEDICAL CENTER | Age: 84
End: 2019-08-23
Payer: COMMERCIAL

## 2019-08-23 VITALS
HEART RATE: 92 BPM | WEIGHT: 298 LBS | DIASTOLIC BLOOD PRESSURE: 74 MMHG | HEIGHT: 70 IN | SYSTOLIC BLOOD PRESSURE: 161 MMHG | BODY MASS INDEX: 42.66 KG/M2

## 2019-08-23 DIAGNOSIS — M25.561 RIGHT KNEE PAIN, UNSPECIFIED CHRONICITY: Primary | ICD-10-CM

## 2019-08-23 DIAGNOSIS — M25.461 EFFUSION OF RIGHT KNEE: ICD-10-CM

## 2019-08-23 DIAGNOSIS — M25.561 RIGHT KNEE PAIN, UNSPECIFIED CHRONICITY: ICD-10-CM

## 2019-08-23 PROCEDURE — 20610 DRAIN/INJ JOINT/BURSA W/O US: CPT | Performed by: PHYSICIAN ASSISTANT

## 2019-08-23 PROCEDURE — 99213 OFFICE O/P EST LOW 20 MIN: CPT | Performed by: PHYSICIAN ASSISTANT

## 2019-08-23 PROCEDURE — 73560 X-RAY EXAM OF KNEE 1 OR 2: CPT

## 2019-08-23 RX ORDER — BUPIVACAINE HYDROCHLORIDE 2.5 MG/ML
2 INJECTION, SOLUTION INFILTRATION; PERINEURAL
Status: COMPLETED | OUTPATIENT
Start: 2019-08-23 | End: 2019-08-23

## 2019-08-23 RX ORDER — BETAMETHASONE SODIUM PHOSPHATE AND BETAMETHASONE ACETATE 3; 3 MG/ML; MG/ML
6 INJECTION, SUSPENSION INTRA-ARTICULAR; INTRALESIONAL; INTRAMUSCULAR; SOFT TISSUE
Status: COMPLETED | OUTPATIENT
Start: 2019-08-23 | End: 2019-08-23

## 2019-08-23 RX ORDER — LIDOCAINE HYDROCHLORIDE 10 MG/ML
2 INJECTION, SOLUTION INFILTRATION; PERINEURAL
Status: COMPLETED | OUTPATIENT
Start: 2019-08-23 | End: 2019-08-23

## 2019-08-23 RX ADMIN — BUPIVACAINE HYDROCHLORIDE 2 ML: 2.5 INJECTION, SOLUTION INFILTRATION; PERINEURAL at 14:00

## 2019-08-23 RX ADMIN — BETAMETHASONE SODIUM PHOSPHATE AND BETAMETHASONE ACETATE 6 MG: 3; 3 INJECTION, SUSPENSION INTRA-ARTICULAR; INTRALESIONAL; INTRAMUSCULAR; SOFT TISSUE at 14:00

## 2019-08-23 RX ADMIN — LIDOCAINE HYDROCHLORIDE 2 ML: 10 INJECTION, SOLUTION INFILTRATION; PERINEURAL at 14:00

## 2019-08-23 NOTE — PROGRESS NOTES
82 y o  male presents today for evaluation of his right knee pain and swelling  He is known to our group from a previous contralateral left total knee replacement  He was symptom-free on his right side until approximately 1-2 weeks ago when he got up and twisted his right knee  He did feel a little snap in it and developed delayed swelling  He presents today ambulating with a cane secondary to pain  ROS  Review of Systems    Past Medical History:   Diagnosis Date    Hypertension     Sleep apnea     no CPAP      Past Surgical History:   Procedure Laterality Date    CARPAL TUNNEL RELEASE      [place] ARTHROCENTESIS ASPIR&/INJ MAJOR JT/BURSA W/O US  6/14/2018    Procedure: ASPIRATION RIGHT KNEE BURSA; Surgeon: Meg Powers MD;  [facility];  Service: Orthopedics    [place] TOTAL KNEE ARTHROPLASTY Left 6/14/2018    Procedure: ARTHROPLASTY KNEE TOTAL;  Surgeon: Meg Powers MD;  [facility];  Service: Orthopedics     Results Reviewed     None          Physical Exam  Physical Exam  Ortho Exam    Imaging  I personally reviewed these images :  Right knee x-rays confirm medial compartment almost bone on bone arthritis  No acute injury such as fracture or dislocation are seen    Procedures  Large joint arthrocentesis: R knee  Date/Time: 8/23/2019 2:00 PM  Consent given by: patient  Supporting Documentation  Indications: pain   Procedure Details  Location: knee - R knee  Needle size: 18 G  Ultrasound guidance: no  Approach: superior  Medications administered: 2 mL lidocaine 1 %; 6 mg betamethasone acetate-betamethasone sodium phosphate 6 (3-3) mg/mL; 2 mL bupivacaine 0 25 %    Aspirate amount: 50 mL  Aspirate: yellow    Patient tolerance: patient tolerated the procedure well with no immediate complications            Assessment/Plan  82 y o  male    1  Right knee pain, unspecified chronicity    - XR knee 1 or 2 vw right; Future    2   Effusion of right knee  Arthrocentesis and cortisone injection as outlined below    Ignacio Coon will follow up in a few weeks if his symptoms of pain and swelling do not improve

## 2019-09-10 ENCOUNTER — OFFICE VISIT (OUTPATIENT)
Dept: OBGYN CLINIC | Facility: HOSPITAL | Age: 84
End: 2019-09-10
Payer: COMMERCIAL

## 2019-09-10 VITALS
BODY MASS INDEX: 42.66 KG/M2 | HEART RATE: 86 BPM | HEIGHT: 70 IN | SYSTOLIC BLOOD PRESSURE: 132 MMHG | DIASTOLIC BLOOD PRESSURE: 82 MMHG | WEIGHT: 298 LBS

## 2019-09-10 DIAGNOSIS — M25.561 RIGHT KNEE PAIN, UNSPECIFIED CHRONICITY: Primary | ICD-10-CM

## 2019-09-10 DIAGNOSIS — M25.461 EFFUSION OF RIGHT KNEE: ICD-10-CM

## 2019-09-10 DIAGNOSIS — M17.11 PRIMARY OSTEOARTHRITIS OF RIGHT KNEE: ICD-10-CM

## 2019-09-10 PROCEDURE — 99213 OFFICE O/P EST LOW 20 MIN: CPT | Performed by: ORTHOPAEDIC SURGERY

## 2019-09-10 NOTE — PROGRESS NOTES
Assessment:  1  Right knee pain, unspecified chronicity  Injection procedure prior authorization   2  Effusion of right knee     3  Primary osteoarthritis of right knee  Injection procedure prior authorization       Plan:  Due to the cortisone injection did not give him much relief, he recommends trying a series of visco injections  He explained that the visco injections, help reduce the friction  The injections are variable by patient  These injections could be done every 6 months  Patient wants to try these series of injections  To do next visit:  Return in about 2 weeks (around 9/24/2019) for Follow up for RIGHT knee visco injection  The above stated was discussed in layman's terms and the patient expressed understanding  All questions were answered to the patient's satisfaction  Scribe Attestation    I,:   Nico Mcbride am acting as a scribe while in the presence of the attending physician :        I,:   Andrew Renee MD personally performed the services described in this documentation    as scribed in my presence :              Subjective:   Kaitlynn Brothers is a 82 y o  male who presents today for a RIGHT knee pain  At his last visit, an aspiration and injection was done for his RIGHT knee osteoarthritis  He only had 2 days of relief with it  He does present with a knee sleeve today  The brace just gives him comfort but no stability  He ambulates with a single point cane today  He has a hx of a Left total knee arthroplasty, 6/14/19  Review of systems negative unless otherwise specified in HPI    Past Medical History:   Diagnosis Date    Hypertension     Sleep apnea     no CPAP        Past Surgical History:   Procedure Laterality Date    CARPAL TUNNEL RELEASE      [place] ARTHROCENTESIS ASPIR&/INJ MAJOR JT/BURSA W/O US  6/14/2018    Procedure: ASPIRATION RIGHT KNEE BURSA;   Surgeon: Andrew Renee MD;  [facility];  Service: Orthopedics    [place] TOTAL KNEE ARTHROPLASTY Left 6/14/2018 Procedure: ARTHROPLASTY KNEE TOTAL;  Surgeon: Navin Boateng MD;  [facility];  Service: Orthopedics       Family History   Problem Relation Age of Onset    No Known Problems Mother     No Known Problems Father        Social History     Occupational History    Not on file   Tobacco Use    Smoking status: Former Smoker    Smokeless tobacco: Never Used    Tobacco comment: quit > 40 years ago    Substance and Sexual Activity    Alcohol use: No    Drug use: No    Sexual activity: Not on file         Current Outpatient Medications:     aspirin 81 mg chewable tablet, Chew, Disp: , Rfl:     lisinopril-hydrochlorothiazide (PRINZIDE,ZESTORETIC) 20-25 MG per tablet, Take 1 tablet by mouth daily, Disp: , Rfl:     LUMIGAN 0 01 % ophthalmic drops, , Disp: , Rfl:     No Known Allergies         Vitals:    09/10/19 1518   BP: 132/82   Pulse: 86       Objective:                    Right Knee Exam     Tenderness   The patient is experiencing tenderness in the medial joint line  Range of Motion   Extension: -10   Flexion: 110 (patellar crepitus)     Other   Sensation: normal  Pulse: present  Swelling: mild  Effusion: no effusion present    Comments:  Soft and supple calf and thigh   Moderate Varus deformity            Diagnostics, reviewed and taken today if performed as documented:    None performed        Procedures, if performed today:    Procedures    None performed      Portions of the record may have been created with voice recognition software  Occasional wrong word or "sound a like" substitutions may have occurred due to the inherent limitations of voice recognition software  Read the chart carefully and recognize, using context, where substitutions have occurred

## 2019-10-10 ENCOUNTER — TELEPHONE (OUTPATIENT)
Dept: PAIN MEDICINE | Facility: MEDICAL CENTER | Age: 84
End: 2019-10-10

## 2019-10-10 NOTE — TELEPHONE ENCOUNTER
Brandee Zhu from [address] [place] [address] stating that they need to r/s the delivery of patient uflexxa injections

## 2019-10-11 ENCOUNTER — OFFICE VISIT (OUTPATIENT)
Dept: OBGYN CLINIC | Facility: MEDICAL CENTER | Age: 84
End: 2019-10-11
Payer: COMMERCIAL

## 2019-10-11 VITALS
WEIGHT: 298 LBS | BODY MASS INDEX: 42.66 KG/M2 | DIASTOLIC BLOOD PRESSURE: 72 MMHG | HEART RATE: 97 BPM | HEIGHT: 70 IN | SYSTOLIC BLOOD PRESSURE: 123 MMHG

## 2019-10-11 DIAGNOSIS — M17.11 PRIMARY OSTEOARTHRITIS OF RIGHT KNEE: Primary | ICD-10-CM

## 2019-10-11 DIAGNOSIS — G89.29 CHRONIC PAIN OF RIGHT KNEE: ICD-10-CM

## 2019-10-11 DIAGNOSIS — M25.561 CHRONIC PAIN OF RIGHT KNEE: ICD-10-CM

## 2019-10-11 PROCEDURE — 20610 DRAIN/INJ JOINT/BURSA W/O US: CPT | Performed by: ORTHOPAEDIC SURGERY

## 2019-10-11 RX ORDER — HYALURONATE SODIUM 10 MG/ML
20 SYRINGE (ML) INTRAARTICULAR
Status: COMPLETED | OUTPATIENT
Start: 2019-10-11 | End: 2019-10-11

## 2019-10-11 RX ADMIN — Medication 20 MG: at 15:36

## 2019-10-11 NOTE — PROGRESS NOTES
Assessment:   Diagnosis ICD-10-CM Associated Orders   1  Primary osteoarthritis of right knee M17 11 Large joint arthrocentesis: R knee   2  Chronic pain of right knee M25 561 Large joint arthrocentesis: R knee    G89 29        Plan:  Right knee known osteoarthritis  Patient presents today for initiation of the Euflexxa 3 series  Patient's right knee was injected with the 1st of 3 Visco injections today  Patient tolerated procedure well  Ice and post injection protocol advised  Weightbearing activities as tolerated  Patient will follow up each of the next 2 weeks to complete the series    To do next visit:  Return in about 1 week (around 10/18/2019) for re-check in [place] on [address] [facility] [address] 10/17 for Euflexxa #2 right knee  Ed's 3rd and last injection will be scheduled for Friday October 25th at the [place] office  The above stated was discussed in layman's terms and the patient expressed understanding  All questions were answered to the patient's satisfaction  Scribe Attestation    I,:   Enrike Rogers am acting as a scribe while in the presence of the attending physician :        I,:   Sergio Arechiga MD personally performed the services described in this documentation    as scribed in my presence :              Subjective:   Sherri Patterson is a 82 y o  male who presents initiation of the Euflexxa series to his right knee for ongoing treatment of his known osteoarthritis  He had a little relief with previous cortisone injection  He continues to have pain medially with weight-bearing activities  He uses a single-point cane for ambulatory assistance  He has a history of left total knee arthroplasty        Review of systems negative unless otherwise specified in HPI    Past Medical History:   Diagnosis Date    Hypertension     Sleep apnea     no CPAP        Past Surgical History:   Procedure Laterality Date    CARPAL TUNNEL RELEASE      [place] ARTHROCENTESIS ASPIR&/INJ MAJOR JT/BURSA W/O   6/14/2018 Procedure: ASPIRATION RIGHT KNEE BURSA; Surgeon: Pauline Tam MD;  [facility];  Service: Orthopedics    [place] TOTAL KNEE ARTHROPLASTY Left 6/14/2018    Procedure: ARTHROPLASTY KNEE TOTAL;  Surgeon: Pauline Tam MD;  [facility];  Service: Orthopedics       Family History   Problem Relation Age of Onset    No Known Problems Mother     No Known Problems Father        Social History     Occupational History    Not on file   Tobacco Use    Smoking status: Former Smoker    Smokeless tobacco: Never Used    Tobacco comment: quit > 40 years ago    Substance and Sexual Activity    Alcohol use: No    Drug use: No    Sexual activity: Not on file         Current Outpatient Medications:     aspirin 81 mg chewable tablet, Chew, Disp: , Rfl:     lisinopril-hydrochlorothiazide (PRINZIDE,ZESTORETIC) 20-25 MG per tablet, Take 1 tablet by mouth daily, Disp: , Rfl:     LUMIGAN 0 01 % ophthalmic drops, , Disp: , Rfl:     No Known Allergies         Vitals:    10/11/19 1506   BP: 123/72   Pulse: 97       Objective:                    Right Knee Exam     Muscle Strength   The patient has normal right knee strength  Tenderness   The patient is experiencing tenderness in the medial joint line  Range of Motion   Extension: 10   Flexion: 110 (With crepitation and stiffness)     Other   Erythema: absent  Sensation: normal  Swelling: mild    Comments:    Varus alignment with bony enlargement medially              Diagnostics, reviewed and taken today if performed as documented:    None performed            Procedures, if performed today:    Large joint arthrocentesis: R knee  Date/Time: 10/11/2019 3:36 PM  Consent given by: patient  Site marked: site marked  Timeout: Immediately prior to procedure a time out was called to verify the correct patient, procedure, equipment, support staff and site/side marked as required   Supporting Documentation  Indications: pain and diagnostic evaluation   Procedure Details  Location: knee - R knee  Preparation: Patient was prepped and draped in the usual sterile fashion  Needle size: 22 G  Ultrasound guidance: no  Approach: anterolateral  Medications administered: 20 mg Sodium Hyaluronate 20 MG/2ML  Specialty Pharmacy Supplied: received medications from pharmacy  Patient tolerance: patient tolerated the procedure well with no immediate complications  Dressing:  Sterile dressing applied             Portions of the record may have been created with voice recognition software  Occasional wrong word or "sound a like" substitutions may have occurred due to the inherent limitations of voice recognition software  Read the chart carefully and recognize, using context, where substitutions have occurred

## 2019-10-17 ENCOUNTER — OFFICE VISIT (OUTPATIENT)
Dept: OBGYN CLINIC | Facility: HOSPITAL | Age: 84
End: 2019-10-17
Payer: COMMERCIAL

## 2019-10-17 VITALS
HEIGHT: 70 IN | BODY MASS INDEX: 41.8 KG/M2 | WEIGHT: 292 LBS | DIASTOLIC BLOOD PRESSURE: 75 MMHG | HEART RATE: 105 BPM | SYSTOLIC BLOOD PRESSURE: 131 MMHG

## 2019-10-17 DIAGNOSIS — M17.11 PRIMARY OSTEOARTHRITIS OF RIGHT KNEE: Primary | ICD-10-CM

## 2019-10-17 PROCEDURE — 20610 DRAIN/INJ JOINT/BURSA W/O US: CPT | Performed by: ORTHOPAEDIC SURGERY

## 2019-10-17 RX ORDER — HYALURONATE SODIUM 10 MG/ML
20 SYRINGE (ML) INTRAARTICULAR
Status: COMPLETED | OUTPATIENT
Start: 2019-10-17 | End: 2019-10-17

## 2019-10-17 RX ADMIN — Medication 20 MG: at 12:53

## 2019-10-17 NOTE — PROGRESS NOTES
83 y o male presents for injection 2 of 3 of viscosupplementation of the right knee  He reports no adverse events, he describes slight lessening of the medial-sided right knee pain as result of injection 1  Review of Systems  Review of systems negative unless otherwise specified in HPI    Past Medical History  Past Medical History:   Diagnosis Date    Hypertension     Sleep apnea     no CPAP        Past Surgical History  Past Surgical History:   Procedure Laterality Date    CARPAL TUNNEL RELEASE      [place] ARTHROCENTESIS ASPIR&/INJ MAJOR JT/BURSA W/O US  6/14/2018    Procedure: ASPIRATION RIGHT KNEE BURSA; Surgeon: Shilpa Moura MD;  [facility];  Service: Orthopedics    [place] TOTAL KNEE ARTHROPLASTY Left 6/14/2018    Procedure: ARTHROPLASTY KNEE TOTAL;  Surgeon: Shilpa Moura MD;  [facility];  Service: Orthopedics       Current Medications  Current Outpatient Medications on File Prior to Visit   Medication Sig Dispense Refill    aspirin 81 mg chewable tablet Chew      lisinopril-hydrochlorothiazide (PRINZIDE,ZESTORETIC) 20-25 MG per tablet Take 1 tablet by mouth daily      LUMIGAN 0 01 % ophthalmic drops        No current facility-administered medications on file prior to visit  Recent Labs [facility])  0   Lab Value Date/Time    HCT 30 8 (L) 06/16/2018 0455    HGB 9 9 (L) 06/16/2018 0455    WBC 9 73 06/16/2018 0455    INR 0 94 05/17/2018 1347    HGBA1C 5 9 05/17/2018 1347         Physical exam  · General: Awake, Alert, Oriented  · Eyes: Pupils equal, round and reactive to light  · Heart: regular rate and rhythm  · Lungs: No audible wheezing  · Abdomen: soft  Exam finds right knee in varus  There is no effusion  There is bony enlargement but very little tenderness medially    Imaging  No new images are obtained today    Procedure  An injection of viscosupplementation provided for the right knee joint    It is documented below      Large joint arthrocentesis  Date/Time: 10/17/2019 12:53 PM  Consent given by: patient  Supporting Documentation  Indications: pain   Procedure Details  Needle size: 22 G  Ultrasound guidance: no  Approach: anteromedial  Medications administered: 20 mg Sodium Hyaluronate 20 MG/2ML    Patient tolerance: patient tolerated the procedure well with no immediate complications  Dressing:  Sterile dressing applied            Assessment/Plan:   83 y o male who requires ongoing viscosupplementation of the right knee  It is advised, except, administers outlined above    I would welcome the opportunity see him next week for ongoing viscosupplementation of the right knee

## 2019-10-25 ENCOUNTER — OFFICE VISIT (OUTPATIENT)
Dept: OBGYN CLINIC | Facility: MEDICAL CENTER | Age: 84
End: 2019-10-25
Payer: COMMERCIAL

## 2019-10-25 VITALS
WEIGHT: 292 LBS | HEIGHT: 70 IN | SYSTOLIC BLOOD PRESSURE: 123 MMHG | DIASTOLIC BLOOD PRESSURE: 74 MMHG | BODY MASS INDEX: 41.8 KG/M2 | HEART RATE: 71 BPM

## 2019-10-25 DIAGNOSIS — M17.11 PRIMARY OSTEOARTHRITIS OF RIGHT KNEE: Primary | ICD-10-CM

## 2019-10-25 DIAGNOSIS — G89.29 CHRONIC PAIN OF RIGHT KNEE: ICD-10-CM

## 2019-10-25 DIAGNOSIS — M25.561 CHRONIC PAIN OF RIGHT KNEE: ICD-10-CM

## 2019-10-25 PROCEDURE — 20610 DRAIN/INJ JOINT/BURSA W/O US: CPT | Performed by: ORTHOPAEDIC SURGERY

## 2019-10-25 NOTE — PROGRESS NOTES
Assessment:  1  Primary osteoarthritis of right knee     2  Chronic pain of right knee         Plan:  The patient was provided with 3rd right knee Euflexxa injection  He should follow up in 3 months  To do next visit:  Return in about 3 months (around 1/25/2020)  The above stated was discussed in layman's terms and the patient expressed understanding  All questions were answered to the patient's satisfaction  Scribe Attestation    I,:   Alcon Lopez am acting as a scribe while in the presence of the attending physician :        I,:   Jose Antonio Rodriguez MD personally performed the services described in this documentation    as scribed in my presence :              Subjective:   Kaitlynn Vargas is a 82 y o  male who presents for 3rd right knee Euflexxa injection  Today he complains of right medial knee pain  He does find benefit from past two Euflexxa injections  Review of systems negative unless otherwise specified in HPI    Past Medical History:   Diagnosis Date    Hypertension     Sleep apnea     no CPAP        Past Surgical History:   Procedure Laterality Date    CARPAL TUNNEL RELEASE      [place] ARTHROCENTESIS ASPIR&/INJ MAJOR JT/BURSA W/O US  6/14/2018    Procedure: ASPIRATION RIGHT KNEE BURSA;   Surgeon: Jose Antonio Rodriguez MD;  [facility];  Service: Orthopedics    [place] TOTAL KNEE ARTHROPLASTY Left 6/14/2018    Procedure: ARTHROPLASTY KNEE TOTAL;  Surgeon: Jose Antonio Rodriguez MD;  [facility];  Service: Orthopedics       Family History   Problem Relation Age of Onset    No Known Problems Mother     No Known Problems Father        Social History     Occupational History    Not on file   Tobacco Use    Smoking status: Former Smoker    Smokeless tobacco: Never Used    Tobacco comment: quit > 40 years ago    Substance and Sexual Activity    Alcohol use: No    Drug use: No    Sexual activity: Not on file         Current Outpatient Medications:     aspirin 81 mg chewable tablet, Chew, Disp: , Rfl:     lisinopril-hydrochlorothiazide (PRINZIDE,ZESTORETIC) 20-25 MG per tablet, Take 1 tablet by mouth daily, Disp: , Rfl:     LUMIGAN 0 01 % ophthalmic drops, , Disp: , Rfl:     No Known Allergies         Vitals:    10/25/19 1257   BP: 123/74   Pulse: 71       Objective:  Physical exam  · General: Awake, Alert, Oriented  · Eyes: Pupils equal, round and reactive to light  · Heart: regular rate and rhythm  · Lungs: No audible wheezing  · Abdomen: soft                    Ortho Exam   Right knee:  TTP medial joint line  Varus alignment   No erythema or ecchymosis  No effusion or swelling  Normal strength  Good ROM   Calf compartments soft and supple  Sensation intact  Toes are warm sensate and mobile        Diagnostics, reviewed and taken today if performed as documented:    None performed      Procedures, if performed today:    Large joint arthrocentesis: R knee  Date/Time: 10/25/2019 1:10 PM  Consent given by: patient  Site marked: site marked  Timeout: Immediately prior to procedure a time out was called to verify the correct patient, procedure, equipment, support staff and site/side marked as required   Supporting Documentation  Indications: pain   Procedure Details  Location: knee - R knee  Preparation: Patient was prepped and draped in the usual sterile fashion  Needle size: 22 G  Ultrasound guidance: no  Approach: anterolateral  Medications administered: 48 mg hylan 48 MG/6ML    Patient tolerance: patient tolerated the procedure well with no immediate complications  Dressing:  Sterile dressing applied            Portions of the record may have been created with voice recognition software  Occasional wrong word or "sound a like" substitutions may have occurred due to the inherent limitations of voice recognition software  Read the chart carefully and recognize, using context, where substitutions have occurred

## 2019-12-02 ENCOUNTER — LAB REQUISITION (OUTPATIENT)
Dept: LAB | Facility: HOSPITAL | Age: 84
End: 2019-12-02
Payer: COMMERCIAL

## 2019-12-02 DIAGNOSIS — D12.3 BENIGN NEOPLASM OF TRANSVERSE COLON: ICD-10-CM

## 2019-12-02 DIAGNOSIS — Z86.010 PERSONAL HISTORY OF COLONIC POLYPS: ICD-10-CM

## 2019-12-02 DIAGNOSIS — K57.30 DIVERTICULOSIS OF LARGE INTESTINE WITHOUT PERFORATION OR ABSCESS WITHOUT BLEEDING: ICD-10-CM

## 2019-12-02 PROCEDURE — 88305 TISSUE EXAM BY PATHOLOGIST: CPT | Performed by: PATHOLOGY

## 2019-12-10 NOTE — PROGRESS NOTES
Daily Note     Today's date: 9/10/2018  Patient name: Nithin Cristina  : 1935  MRN: [id number]  Referring provider: Alexandra Diaz MD  Dx:   Encounter Diagnosis     ICD-10-CM    1  Primary osteoarthritis of left knee M17 12    2  Chronic pain of left knee M25 562     G89 29    3  History of total left knee replacement Z96 652                   Subjective: Pt states Ortho MD prescribed antibiotics for the wound on his shin  Pt states he returns to [place] in 2 weeks         Objective: See treatment diary below  Manual  7/23 7/26 7/30  8/2  8/6  9/4  9/6  9/10       PROM (flexion seated, ext supine) 10'  10'  10'  10'  10'  10'  10'  10'                                                                                                             Exercise Diary  7/23  7/26  7/30  8/2  8/6 9/4  9/6  9/10       bike 6' ROM 6' rom  6'  6'  6'  6'  6'  6'       Heel slides 10x10" 10x10"  10x10"    10x10"  np           SLR 3# 3x10 3# 3x10 3# 3x10  4# 2x10  NP NV  4# 3x10 4# 3x10       Quad sets 10x10" 10x10"  10x10"    NP np           LAQ 5# 3x10 5# 3x10 5# 3x10  33# 2x10 44# 3x10  44# 2x10 44# 3x10 44# 3x10       Ham curl machine          44# 4x10 44# 2x10 44# 3x10 44# 3x10       Leg press  110# 2x20 110# 2x20  120# 2x20  120# 2x20  130#     120# 2x20  130# 3x10 130# 3x10       Heel raises  3x10 3x10  3x10 3x10   3x10   np  np  np       Standing hip abd 3# 3x10 3# 3x10 3# 3x10  4# 2x10  5# 2x10 5# 2x10 ea 5# 3x10 ea 5# 3x10 ea       Standing hip ext 3# 3x10 3# 3x10 3# 3x10  4# 2x10  5# 2x10  5# 2x10ea 5# 3x10 ea 5# 3x10 ea       Step ups -lateral  np 6" x10  np np  np  np  np  np       Up and over   6" x20 6" x20 6" x20  8" x10  8" x10  8" x20 8" x20 8" x20                                SLS    10"x3  10"x3  NP    np  15"x3 15"x3                                                                                                                                     modalities 7/23  7/26  7/30  8/2  8/6  9/4 9/6 9/10            10'  10'  10'  10'  np  10'  8'                                                             Assessment: Tolerated treatment well  Patient exhibited good technique with therapeutic exercises  Passive knee extension most limited, painful  Supervised by PT, SY : [phone]      Plan: Progress treatment as tolerated  Hemostasis: Electrocautery

## 2020-05-01 ENCOUNTER — TELEPHONE (OUTPATIENT)
Dept: UROLOGY | Facility: AMBULATORY SURGERY CENTER | Age: 85
End: 2020-05-01

## 2020-05-06 ENCOUNTER — TELEMEDICINE (OUTPATIENT)
Dept: UROLOGY | Facility: CLINIC | Age: 85
End: 2020-05-06
Payer: COMMERCIAL

## 2020-05-06 DIAGNOSIS — R97.20 ELEVATED PSA: Primary | ICD-10-CM

## 2020-05-06 PROCEDURE — 99443 PR PHYS/QHP TELEPHONE EVALUATION 21-30 MIN: CPT | Performed by: PHYSICIAN ASSISTANT

## 2020-07-31 ENCOUNTER — OFFICE VISIT (OUTPATIENT)
Dept: OBGYN CLINIC | Facility: MEDICAL CENTER | Age: 85
End: 2020-07-31
Payer: COMMERCIAL

## 2020-07-31 VITALS
HEIGHT: 70 IN | SYSTOLIC BLOOD PRESSURE: 139 MMHG | WEIGHT: 296.6 LBS | HEART RATE: 74 BPM | BODY MASS INDEX: 42.46 KG/M2 | DIASTOLIC BLOOD PRESSURE: 82 MMHG

## 2020-07-31 DIAGNOSIS — M17.11 PRIMARY OSTEOARTHRITIS OF RIGHT KNEE: Primary | ICD-10-CM

## 2020-07-31 PROCEDURE — 99213 OFFICE O/P EST LOW 20 MIN: CPT | Performed by: ORTHOPAEDIC SURGERY

## 2020-07-31 NOTE — PROGRESS NOTES
Assessment:  No diagnosis found  Plan:  Patient was educated on maintaining a healthy lifestyle with proper weight management and physician recommended home exercise program/routine for regular fitness  A goal of 18lbs weight loss at bodyweight of 278lbs was discussed as a goal for consideration of total right knee replacement  Visco-supplement was ordered  He should follow up in 2 weeks for visco x3  To do next visit:  Return in about 2 weeks (around 8/14/2020) for for right knee visco x3   The above stated was discussed in layman's terms and the patient expressed understanding  All questions were answered to the patient's satisfaction  Scribe Attestation    I,:   Latha Lux am acting as a scribe while in the presence of the attending physician :        I,:   Leo Duke MD personally performed the services described in this documentation    as scribed in my presence :              Subjective:   Jaleel Hairston is a 82 y o  male who presents for follow up of right knee  He is s/p right knee Euflexxa injections with lasting benefit  Today he complains of right medial anterior knee pain  He rates his pain at 1-5/10  Walking up or downhill aggravates while rest alleviates  He denies medications for this issue  Review of systems negative unless otherwise specified in HPI    Past Medical History:   Diagnosis Date    Hypertension     Sleep apnea     no CPAP        Past Surgical History:   Procedure Laterality Date    CARPAL TUNNEL RELEASE      [place] ARTHROCENTESIS ASPIR&/INJ MAJOR JT/BURSA W/O US  6/14/2018    Procedure: ASPIRATION RIGHT KNEE BURSA;   Surgeon: Leo Duke MD;  [facility];  Service: Orthopedics    [place] TOTAL KNEE ARTHROPLASTY Left 6/14/2018    Procedure: ARTHROPLASTY KNEE TOTAL;  Surgeon: Leo Duke MD;  [facility];  Service: Orthopedics       Family History   Problem Relation Age of Onset    No Known Problems Mother     No Known Problems Father        Social History     Occupational History    Not on file   Tobacco Use    Smoking status: Former Smoker    Smokeless tobacco: Never Used    Tobacco comment: quit > 40 years ago    Substance and Sexual Activity    Alcohol use: No    Drug use: No    Sexual activity: Not on file         Current Outpatient Medications:     aspirin 81 mg chewable tablet, Chew, Disp: , Rfl:     lisinopril-hydrochlorothiazide (PRINZIDE,ZESTORETIC) 20-25 MG per tablet, Take 1 tablet by mouth daily, Disp: , Rfl:     LUMIGAN 0 01 % ophthalmic drops, , Disp: , Rfl:     No Known Allergies         Vitals:    07/31/20 1127   BP: 139/82   Pulse: 74       Objective:  Physical exam  · General: Awake, Alert, Oriented  · Eyes: Pupils equal, round and reactive to light  · Heart: regular rate and rhythm  · Lungs: No audible wheezing  · Abdomen: soft                    Ortho Exam   Right knee:  Varus alignment  No erythema or ecchymosis  No effusion or swelling  Normal strength  Good ROM   Calf compartments soft and supple  Sensation intact  Toes are warm sensate and mobile    Left knee:  Well healed anterior incision  No erythema and no ecchymosis  Stable to varus and valgus stress  Extensor mechanism intact  Calf compartments soft and supple  Sensation intact  Toes are warm sensate and mobile      Diagnostics, reviewed and taken today if performed as documented:    None performed    Procedures, if performed today:    Procedures    None performed      Portions of the record may have been created with voice recognition software  Occasional wrong word or "sound a like" substitutions may have occurred due to the inherent limitations of voice recognition software  Read the chart carefully and recognize, using context, where substitutions have occurred

## 2020-10-23 ENCOUNTER — APPOINTMENT (OUTPATIENT)
Dept: LAB | Facility: MEDICAL CENTER | Age: 85
End: 2020-10-23
Payer: COMMERCIAL

## 2020-10-23 DIAGNOSIS — R97.20 ELEVATED PSA: ICD-10-CM

## 2020-10-23 PROCEDURE — 84153 ASSAY OF PSA TOTAL: CPT

## 2020-10-24 LAB — PSA SERPL-MCNC: 4.3 NG/ML (ref 0–4)

## 2020-11-05 ENCOUNTER — OFFICE VISIT (OUTPATIENT)
Dept: UROLOGY | Facility: CLINIC | Age: 85
End: 2020-11-05
Payer: COMMERCIAL

## 2020-11-05 VITALS
WEIGHT: 298 LBS | HEIGHT: 70 IN | BODY MASS INDEX: 42.66 KG/M2 | HEART RATE: 105 BPM | DIASTOLIC BLOOD PRESSURE: 78 MMHG | SYSTOLIC BLOOD PRESSURE: 134 MMHG

## 2020-11-05 DIAGNOSIS — R97.20 ELEVATED PSA: Primary | ICD-10-CM

## 2020-11-05 PROCEDURE — 99213 OFFICE O/P EST LOW 20 MIN: CPT | Performed by: PHYSICIAN ASSISTANT

## 2021-01-15 ENCOUNTER — OFFICE VISIT (OUTPATIENT)
Dept: OBGYN CLINIC | Facility: MEDICAL CENTER | Age: 86
End: 2021-01-15
Payer: COMMERCIAL

## 2021-01-15 VITALS
HEIGHT: 72 IN | BODY MASS INDEX: 40.77 KG/M2 | WEIGHT: 301 LBS | DIASTOLIC BLOOD PRESSURE: 92 MMHG | HEART RATE: 86 BPM | SYSTOLIC BLOOD PRESSURE: 155 MMHG | RESPIRATION RATE: 16 BRPM

## 2021-01-15 DIAGNOSIS — M20.22 HALLUX RIGIDUS OF LEFT FOOT: ICD-10-CM

## 2021-01-15 DIAGNOSIS — M19.072 ARTHRITIS OF FIRST METATARSOPHALANGEAL (MTP) JOINT OF LEFT FOOT: ICD-10-CM

## 2021-01-15 DIAGNOSIS — M17.11 PRIMARY OSTEOARTHRITIS OF RIGHT KNEE: Primary | ICD-10-CM

## 2021-01-15 DIAGNOSIS — G89.29 CHRONIC PAIN OF RIGHT KNEE: ICD-10-CM

## 2021-01-15 DIAGNOSIS — M25.561 CHRONIC PAIN OF RIGHT KNEE: ICD-10-CM

## 2021-01-15 PROCEDURE — 20610 DRAIN/INJ JOINT/BURSA W/O US: CPT | Performed by: ORTHOPAEDIC SURGERY

## 2021-01-15 PROCEDURE — 1036F TOBACCO NON-USER: CPT | Performed by: ORTHOPAEDIC SURGERY

## 2021-01-15 PROCEDURE — 1160F RVW MEDS BY RX/DR IN RCRD: CPT | Performed by: ORTHOPAEDIC SURGERY

## 2021-01-15 PROCEDURE — 99213 OFFICE O/P EST LOW 20 MIN: CPT | Performed by: ORTHOPAEDIC SURGERY

## 2021-01-15 PROCEDURE — 20600 DRAIN/INJ JOINT/BURSA W/O US: CPT | Performed by: ORTHOPAEDIC SURGERY

## 2021-01-15 RX ORDER — BETAMETHASONE SODIUM PHOSPHATE AND BETAMETHASONE ACETATE 3; 3 MG/ML; MG/ML
3 INJECTION, SUSPENSION INTRA-ARTICULAR; INTRALESIONAL; INTRAMUSCULAR; SOFT TISSUE
Status: COMPLETED | OUTPATIENT
Start: 2021-01-15 | End: 2021-01-15

## 2021-01-15 RX ORDER — BUPIVACAINE HYDROCHLORIDE 2.5 MG/ML
0.5 INJECTION, SOLUTION INFILTRATION; PERINEURAL
Status: COMPLETED | OUTPATIENT
Start: 2021-01-15 | End: 2021-01-15

## 2021-01-15 RX ORDER — LIDOCAINE HYDROCHLORIDE 10 MG/ML
0.5 INJECTION, SOLUTION INFILTRATION; PERINEURAL
Status: COMPLETED | OUTPATIENT
Start: 2021-01-15 | End: 2021-01-15

## 2021-01-15 RX ORDER — HYALURONATE SODIUM 10 MG/ML
20 SYRINGE (ML) INTRAARTICULAR
Status: COMPLETED | OUTPATIENT
Start: 2021-01-15 | End: 2021-01-15

## 2021-01-15 RX ADMIN — BUPIVACAINE HYDROCHLORIDE 0.5 ML: 2.5 INJECTION, SOLUTION INFILTRATION; PERINEURAL at 10:06

## 2021-01-15 RX ADMIN — BETAMETHASONE SODIUM PHOSPHATE AND BETAMETHASONE ACETATE 3 MG: 3; 3 INJECTION, SUSPENSION INTRA-ARTICULAR; INTRALESIONAL; INTRAMUSCULAR; SOFT TISSUE at 10:06

## 2021-01-15 RX ADMIN — LIDOCAINE HYDROCHLORIDE 0.5 ML: 10 INJECTION, SOLUTION INFILTRATION; PERINEURAL at 10:06

## 2021-01-15 RX ADMIN — Medication 20 MG: at 10:09

## 2021-01-15 NOTE — PROGRESS NOTES
Assessment:   Diagnosis ICD-10-CM Associated Orders   1  Primary osteoarthritis of right knee  M17 11 Large joint arthrocentesis: R knee   2  Chronic pain of right knee  M25 561 Large joint arthrocentesis: R knee    G89 29    3  Hallux rigidus of left foot  M20 22 Small joint arthrocentesis: R great MTP   4  Arthritis of first metatarsophalangeal (MTP) joint of left foot  M19 072 Small joint arthrocentesis: R great MTP       Plan:  Right knee known osteoarthritis in which he returns today for the 1st of 3 Euflexxa injections  Patient tolerated the procedure well  On exam he also has tenderness at his 1st TMT joint that increases with extension  He was offered, accepted, performed injection of cortisone to his right 1st MTP for symptomatic relief  He tolerated procedure well  Ice and post injection protocol advised  Weightbearing activities as tolerated  Patient will follow up each of the next 2 weeks to complete the Visco series to his right knee  To do next visit:  Return in about 1 week (around 1/22/2021) for re-check and Euflexxa #2 right knee  The above stated was discussed in layman's terms and the patient expressed understanding  All questions were answered to the patient's satisfaction  Scribe Attestation    I,:  Adi Severino am acting as a scribe while in the presence of the attending physician :       I,:  Eli Wolfe MD personally performed the services described in this documentation    as scribed in my presence :             Subjective:   Rhonda Coffman is a 82 y o  male who presents today for repeat evaluation of his right knee, known osteoarthritis as well as the initial injection of the Euflexxa 3 series  He had previous Visco series greater than a year ago with relief  He is also complaining of left great toe pain  Patient tripped over a gas pump hose a year ago resulting in injury to his left great toe  He has increased pain with walking and push off maneuvers        Review of systems negative unless otherwise specified in HPI  Review of Systems    Past Medical History:   Diagnosis Date    Hypertension     Sleep apnea     no CPAP        Past Surgical History:   Procedure Laterality Date    CARPAL TUNNEL RELEASE      [place] ARTHROCENTESIS ASPIR&/INJ MAJOR JT/BURSA W/O US  6/14/2018    Procedure: ASPIRATION RIGHT KNEE BURSA; Surgeon: Pauline Tam MD;  [facility];  Service: Orthopedics    [place] TOTAL KNEE ARTHROPLASTY Left 6/14/2018    Procedure: ARTHROPLASTY KNEE TOTAL;  Surgeon: Pauline Tam MD;  [facility];  Service: Orthopedics       Family History   Problem Relation Age of Onset    No Known Problems Mother     No Known Problems Father        Social History     Occupational History    Not on file   Tobacco Use    Smoking status: Former Smoker    Smokeless tobacco: Never Used    Tobacco comment: quit > 40 years ago    Substance and Sexual Activity    Alcohol use: No    Drug use: No    Sexual activity: Not on file         Current Outpatient Medications:     aspirin 81 mg chewable tablet, Chew, Disp: , Rfl:     lisinopril-hydrochlorothiazide (PRINZIDE,ZESTORETIC) 20-25 MG per tablet, Take 1 tablet by mouth daily, Disp: , Rfl:     LUMIGAN 0 01 % ophthalmic drops, , Disp: , Rfl:     No Known Allergies         Vitals:    01/15/21 0937   BP: 155/92   Pulse: 86   Resp: 16       Objective:                    Left Ankle Exam     Range of Motion   The patient has normal left ankle ROM  Muscle Strength   The patient has normal left ankle strength  Other   Erythema: absent    Comments:    tenderness 1st MTP joint which increases with extension  Right Knee Exam     Muscle Strength   The patient has normal right knee strength  Tenderness   The patient is experiencing tenderness in the medial joint line      Range of Motion   Extension: 5   Flexion: 110 (With crepitation and stiffness)     Other   Erythema: absent  Sensation: normal  Swelling: mild    Comments:    Varus alignment with bony enlargement medially  Diagnostics, reviewed and taken today if performed as documented:    None performed          Procedures, if performed today:    Large joint arthrocentesis: R knee  Universal Protocol:  Consent: Verbal consent obtained  Risks and benefits: risks, benefits and alternatives were discussed  Consent given by: patient  Time out: Immediately prior to procedure a "time out" was called to verify the correct patient, procedure, equipment, support staff and site/side marked as required  Timeout called at: 1/15/2021 9:58 AM   Patient understanding: patient states understanding of the procedure being performed  Site marked: the operative site was marked  Patient identity confirmed: verbally with patient    Supporting Documentation  Indications: pain and diagnostic evaluation   Procedure Details  Location: knee - R knee  Preparation: Patient was prepped and draped in the usual sterile fashion  Needle size: 22 G  Ultrasound guidance: no  Approach: anterolateral  Medications administered: 20 mg Sodium Hyaluronate 20 MG/2ML  Specialty Pharmacy Supplied: received medications from pharmacy  Patient tolerance: patient tolerated the procedure well with no immediate complications  Dressing:  Sterile dressing applied    Small joint arthrocentesis: R great MTP  Universal Protocol:  Risks and benefits: risks, benefits and alternatives were discussed  Consent given by: patient  Time out: Immediately prior to procedure a "time out" was called to verify the correct patient, procedure, equipment, support staff and site/side marked as required    Timeout called at: 1/15/2021 9:55 AM   Patient understanding: patient states understanding of the procedure being performed  Site marked: the operative site was marked  Patient identity confirmed: verbally with patient    Supporting Documentation  Indications: pain   Procedure Details  Location: great toe - R great MTP  Preparation: Patient was prepped and draped in the usual sterile fashion  Needle size: 25 G  Ultrasound guidance: no  Approach: volar  Medications administered: 0 5 mL bupivacaine 0 25 %; 0 5 mL lidocaine 1 %; 3 mg betamethasone acetate-betamethasone sodium phosphate 6 (3-3) mg/mL    Patient tolerance: patient tolerated the procedure well with no immediate complications  Dressing:  Sterile dressing applied            Portions of the record may have been created with voice recognition software  Occasional wrong word or "sound a like" substitutions may have occurred due to the inherent limitations of voice recognition software  Read the chart carefully and recognize, using context, where substitutions have occurred

## 2021-01-22 ENCOUNTER — OFFICE VISIT (OUTPATIENT)
Dept: OBGYN CLINIC | Facility: MEDICAL CENTER | Age: 86
End: 2021-01-22
Payer: COMMERCIAL

## 2021-01-22 VITALS
HEART RATE: 80 BPM | SYSTOLIC BLOOD PRESSURE: 146 MMHG | HEIGHT: 72 IN | DIASTOLIC BLOOD PRESSURE: 83 MMHG | WEIGHT: 300 LBS | RESPIRATION RATE: 18 BRPM | BODY MASS INDEX: 40.63 KG/M2

## 2021-01-22 DIAGNOSIS — M17.11 PRIMARY OSTEOARTHRITIS OF RIGHT KNEE: Primary | ICD-10-CM

## 2021-01-22 DIAGNOSIS — G89.29 CHRONIC PAIN OF RIGHT KNEE: ICD-10-CM

## 2021-01-22 DIAGNOSIS — Z23 ENCOUNTER FOR IMMUNIZATION: ICD-10-CM

## 2021-01-22 DIAGNOSIS — M25.561 CHRONIC PAIN OF RIGHT KNEE: ICD-10-CM

## 2021-01-22 PROCEDURE — 20610 DRAIN/INJ JOINT/BURSA W/O US: CPT | Performed by: ORTHOPAEDIC SURGERY

## 2021-01-22 RX ORDER — HYALURONATE SODIUM 10 MG/ML
20 SYRINGE (ML) INTRAARTICULAR
Status: COMPLETED | OUTPATIENT
Start: 2021-01-22 | End: 2021-01-22

## 2021-01-22 RX ADMIN — Medication 20 MG: at 10:05

## 2021-01-22 NOTE — PROGRESS NOTES
Assessment:   Diagnosis ICD-10-CM Associated Orders   1  Primary osteoarthritis of right knee  M17 11 Large joint arthrocentesis: R knee   2  Chronic pain of right knee  M25 561 Large joint arthrocentesis: R knee    G89 29        Plan:  Right knee known osteoarthritis  Patient returns today for the 2nd Euflexxa Visco injection  Patient tolerated the 2nd injection well  Ice and post injection protocol advised  Weight-bearing and activities as tolerated  Patient will follow up next week to complete the Visco series  To do next visit:  Return in about 1 week (around 1/29/2021) for re-check and 3rd and final Euflexxa  The above stated was discussed in layman's terms and the patient expressed understanding  All questions were answered to the patient's satisfaction  Scribe Attestation    I,:  Abbie Nguyen am acting as a scribe while in the presence of the attending physician :       I,:  Randee Shepherd MD personally performed the services described in this documentation    as scribed in my presence :             Subjective:   Vazquez Sims is a 82 y o  male who presents repeat evaluation of his right knee, known osteoarthritis as well as his 2nd Euflexxa injection  Patient tolerated last week's initial injection well  No indication not to proceed with today's 2nd Visco injection  Last week's injection of cortisone to his left 1st MTP joint has provided significant relief  Review of systems negative unless otherwise specified in HPI  Review of Systems    Past Medical History:   Diagnosis Date    Hypertension     Sleep apnea     no CPAP        Past Surgical History:   Procedure Laterality Date    CARPAL TUNNEL RELEASE      [place] ARTHROCENTESIS ASPIR&/INJ MAJOR JT/BURSA W/O US  6/14/2018    Procedure: ASPIRATION RIGHT KNEE BURSA;   Surgeon: Randee Shepherd MD;  [facility];  Service: Orthopedics    [place] TOTAL KNEE ARTHROPLASTY Left 6/14/2018    Procedure: ARTHROPLASTY KNEE TOTAL;  Surgeon: Moises Hart MD;  [facility];  Service: Orthopedics       Family History   Problem Relation Age of Onset    No Known Problems Mother     No Known Problems Father        Social History     Occupational History    Not on file   Tobacco Use    Smoking status: Former Smoker    Smokeless tobacco: Never Used    Tobacco comment: quit > 40 years ago    Substance and Sexual Activity    Alcohol use: No    Drug use: No    Sexual activity: Not on file         Current Outpatient Medications:     aspirin 81 mg chewable tablet, Chew, Disp: , Rfl:     lisinopril-hydrochlorothiazide (PRINZIDE,ZESTORETIC) 20-25 MG per tablet, Take 1 tablet by mouth daily, Disp: , Rfl:     LUMIGAN 0 01 % ophthalmic drops, , Disp: , Rfl:     No Known Allergies         Vitals:    01/22/21 0944   BP: 146/83   Pulse: 80   Resp: 18       Objective:                    Right Knee Exam     Muscle Strength   The patient has normal right knee strength  Tenderness   The patient is experiencing tenderness in the medial joint line  Range of Motion   Extension: 5   Flexion: 110 (With crepitation and stiffness)     Other   Erythema: absent  Sensation: normal  Swelling: mild    Comments:    Varus alignment with bony enlargement medially  No indication not to proceed with today's 2nd Visco injection            Diagnostics, reviewed and taken today if performed as documented:    None performed          Procedures, if performed today:    Large joint arthrocentesis: R knee  Universal Protocol:  Consent: Verbal consent obtained  Risks and benefits: risks, benefits and alternatives were discussed  Consent given by: patient  Time out: Immediately prior to procedure a "time out" was called to verify the correct patient, procedure, equipment, support staff and site/side marked as required    Timeout called at: 1/22/2021 9:55 AM   Patient understanding: patient states understanding of the procedure being performed  Site marked: the operative site was marked  Patient identity confirmed: verbally with patient    Supporting Documentation  Indications: pain and diagnostic evaluation   Procedure Details  Location: knee - R knee  Preparation: Patient was prepped and draped in the usual sterile fashion  Needle size: 22 G  Ultrasound guidance: no  Approach: anteromedial  Medications administered: 20 mg Sodium Hyaluronate 20 MG/2ML  Specialty Pharmacy Supplied: received medications from pharmacy  Patient tolerance: patient tolerated the procedure well with no immediate complications  Dressing:  Sterile dressing applied               Portions of the record may have been created with voice recognition software  Occasional wrong word or "sound a like" substitutions may have occurred due to the inherent limitations of voice recognition software  Read the chart carefully and recognize, using context, where substitutions have occurred

## 2021-01-27 ENCOUNTER — IMMUNIZATIONS (OUTPATIENT)
Dept: FAMILY MEDICINE CLINIC | Facility: HOSPITAL | Age: 86
End: 2021-01-27

## 2021-01-27 DIAGNOSIS — Z23 ENCOUNTER FOR IMMUNIZATION: Primary | ICD-10-CM

## 2021-01-27 PROCEDURE — 91301 SARS-COV-2 / COVID-19 MRNA VACCINE (MODERNA) 100 MCG: CPT

## 2021-01-27 PROCEDURE — 0011A SARS-COV-2 / COVID-19 MRNA VACCINE (MODERNA) 100 MCG: CPT

## 2021-01-29 ENCOUNTER — OFFICE VISIT (OUTPATIENT)
Dept: OBGYN CLINIC | Facility: MEDICAL CENTER | Age: 86
End: 2021-01-29
Payer: COMMERCIAL

## 2021-01-29 VITALS
SYSTOLIC BLOOD PRESSURE: 149 MMHG | BODY MASS INDEX: 40.61 KG/M2 | HEART RATE: 85 BPM | DIASTOLIC BLOOD PRESSURE: 80 MMHG | RESPIRATION RATE: 16 BRPM | HEIGHT: 72 IN | WEIGHT: 299.8 LBS

## 2021-01-29 DIAGNOSIS — G89.29 CHRONIC PAIN OF RIGHT KNEE: ICD-10-CM

## 2021-01-29 DIAGNOSIS — M17.11 PRIMARY OSTEOARTHRITIS OF RIGHT KNEE: Primary | ICD-10-CM

## 2021-01-29 DIAGNOSIS — M25.561 CHRONIC PAIN OF RIGHT KNEE: ICD-10-CM

## 2021-01-29 PROCEDURE — 20610 DRAIN/INJ JOINT/BURSA W/O US: CPT | Performed by: ORTHOPAEDIC SURGERY

## 2021-01-29 RX ORDER — HYALURONATE SODIUM 10 MG/ML
20 SYRINGE (ML) INTRAARTICULAR
Status: COMPLETED | OUTPATIENT
Start: 2021-01-29 | End: 2021-01-29

## 2021-01-29 RX ADMIN — Medication 20 MG: at 09:34

## 2021-01-29 NOTE — PROGRESS NOTES
Assessment:   Diagnosis ICD-10-CM Associated Orders   1  Primary osteoarthritis of right knee  M17 11 Large joint arthrocentesis: R knee   2  Chronic pain of right knee  M25 561 Large joint arthrocentesis: R knee    G89 29        Plan:  Right knee known osteoarthritis  Patient presents today for his 3rd and final Euflexxa injection  Patient tolerated today's injection well  Ice and post injection protocol advised  Weightbearing activities as tolerated  To do next visit:  Return in about 3 months (around 4/29/2021) for re-check  The above stated was discussed in layman's terms and the patient expressed understanding  All questions were answered to the patient's satisfaction  Scribe Attestation    I,:  Christine Michael am acting as a scribe while in the presence of the attending physician :       I,:  Isaura Watson MD personally performed the services described in this documentation    as scribed in my presence :             Subjective:   Junior Tai is a 82 y o  male who presents today for his 2rd and final Euflexxa injection to his right knee for ongoing treatment of his known osteoarthritis  Patient tolerated the previous 2 injections well and has noted some improvement of his knee symptoms  There is no indication not to proceed with the conclusion of the Visco series today  Review of systems negative unless otherwise specified in HPI  Review of Systems    Past Medical History:   Diagnosis Date    Hypertension     Sleep apnea     no CPAP        Past Surgical History:   Procedure Laterality Date    CARPAL TUNNEL RELEASE      [place] ARTHROCENTESIS ASPIR&/INJ MAJOR JT/BURSA W/O US  6/14/2018    Procedure: ASPIRATION RIGHT KNEE BURSA;   Surgeon: Isaura Watson MD;  [facility];  Service: Orthopedics    [place] TOTAL KNEE ARTHROPLASTY Left 6/14/2018    Procedure: ARTHROPLASTY KNEE TOTAL;  Surgeon: Isaura Watson MD;  [facility];  Service: Orthopedics       Family History   Problem Relation Age of Onset    No Known Problems Mother     No Known Problems Father        Social History     Occupational History    Not on file   Tobacco Use    Smoking status: Former Smoker    Smokeless tobacco: Never Used    Tobacco comment: quit > 40 years ago    Substance and Sexual Activity    Alcohol use: No    Drug use: No    Sexual activity: Not on file         Current Outpatient Medications:     aspirin 81 mg chewable tablet, Chew, Disp: , Rfl:     lisinopril-hydrochlorothiazide (PRINZIDE,ZESTORETIC) 20-25 MG per tablet, Take 1 tablet by mouth daily, Disp: , Rfl:     LUMIGAN 0 01 % ophthalmic drops, , Disp: , Rfl:     No Known Allergies         Vitals:    01/29/21 0934   BP: 149/80   Pulse: 85   Resp: 16       Objective:                    Right Knee Exam     Muscle Strength   The patient has normal right knee strength  Tenderness   The patient is experiencing tenderness in the medial joint line  Range of Motion   Extension: 5   Flexion: 110 (With less crepitation and less stiffness)     Other   Erythema: absent  Sensation: normal  Swelling: mild    Comments:    Varus alignment with bony enlargement medially  No indication not to proceed with today's 3rd and final Visco injection            Diagnostics, reviewed and taken today if performed as documented:    None performed        Procedures, if performed today:    Large joint arthrocentesis: R knee  Universal Protocol:  Consent: Verbal consent obtained  Risks and benefits: risks, benefits and alternatives were discussed  Consent given by: patient  Time out: Immediately prior to procedure a "time out" was called to verify the correct patient, procedure, equipment, support staff and site/side marked as required    Timeout called at: 1/29/2021 9:41 AM   Patient understanding: patient states understanding of the procedure being performed  Site marked: the operative site was marked  Patient identity confirmed: verbally with patient    Supporting Documentation  Indications: pain and diagnostic evaluation   Procedure Details  Location: knee - R knee  Preparation: Patient was prepped and draped in the usual sterile fashion  Needle size: 22 G  Ultrasound guidance: no  Approach: anterolateral  Medications administered: 20 mg Sodium Hyaluronate 20 MG/2ML  Specialty Pharmacy Supplied: received medications from pharmacy  Patient tolerance: patient tolerated the procedure well with no immediate complications  Dressing:  Sterile dressing applied              Portions of the record may have been created with voice recognition software  Occasional wrong word or "sound a like" substitutions may have occurred due to the inherent limitations of voice recognition software  Read the chart carefully and recognize, using context, where substitutions have occurred

## 2021-02-24 ENCOUNTER — IMMUNIZATIONS (OUTPATIENT)
Dept: FAMILY MEDICINE CLINIC | Facility: HOSPITAL | Age: 86
End: 2021-02-24

## 2021-02-24 DIAGNOSIS — Z23 ENCOUNTER FOR IMMUNIZATION: Primary | ICD-10-CM

## 2021-02-24 PROCEDURE — 0012A SARS-COV-2 / COVID-19 MRNA VACCINE (MODERNA) 100 MCG: CPT

## 2021-02-24 PROCEDURE — 91301 SARS-COV-2 / COVID-19 MRNA VACCINE (MODERNA) 100 MCG: CPT

## 2021-04-30 ENCOUNTER — OFFICE VISIT (OUTPATIENT)
Dept: OBGYN CLINIC | Facility: MEDICAL CENTER | Age: 86
End: 2021-04-30
Payer: COMMERCIAL

## 2021-04-30 DIAGNOSIS — G89.29 CHRONIC PAIN OF RIGHT KNEE: ICD-10-CM

## 2021-04-30 DIAGNOSIS — M17.11 PRIMARY OSTEOARTHRITIS OF RIGHT KNEE: Primary | ICD-10-CM

## 2021-04-30 DIAGNOSIS — M25.561 CHRONIC PAIN OF RIGHT KNEE: ICD-10-CM

## 2021-04-30 PROCEDURE — 1036F TOBACCO NON-USER: CPT | Performed by: ORTHOPAEDIC SURGERY

## 2021-04-30 PROCEDURE — 99213 OFFICE O/P EST LOW 20 MIN: CPT | Performed by: ORTHOPAEDIC SURGERY

## 2021-04-30 PROCEDURE — 1160F RVW MEDS BY RX/DR IN RCRD: CPT | Performed by: ORTHOPAEDIC SURGERY

## 2021-04-30 RX ORDER — FOLIC ACID 1 MG/1
1 TABLET ORAL DAILY
Qty: 30 TABLET | Refills: 0 | Status: SHIPPED | OUTPATIENT
Start: 2021-04-30 | End: 2021-06-17 | Stop reason: HOSPADM

## 2021-04-30 RX ORDER — FERROUS SULFATE TAB EC 324 MG (65 MG FE EQUIVALENT) 324 (65 FE) MG
324 TABLET DELAYED RESPONSE ORAL
Qty: 60 TABLET | Refills: 0 | Status: SHIPPED | OUTPATIENT
Start: 2021-04-30 | End: 2021-06-17 | Stop reason: HOSPADM

## 2021-04-30 RX ORDER — CHLORHEXIDINE GLUCONATE 0.12 MG/ML
15 RINSE ORAL ONCE
Status: CANCELLED | OUTPATIENT
Start: 2021-04-30 | End: 2021-04-30

## 2021-04-30 RX ORDER — ASCORBIC ACID 500 MG
500 TABLET ORAL DAILY
Qty: 30 TABLET | Refills: 0 | Status: SHIPPED | OUTPATIENT
Start: 2021-04-30 | End: 2021-06-17 | Stop reason: HOSPADM

## 2021-04-30 RX ORDER — ACETAMINOPHEN 325 MG/1
975 TABLET ORAL ONCE
Status: CANCELLED | OUTPATIENT
Start: 2021-04-30 | End: 2021-04-30

## 2021-04-30 RX ORDER — SODIUM CHLORIDE, SODIUM LACTATE, POTASSIUM CHLORIDE, CALCIUM CHLORIDE 600; 310; 30; 20 MG/100ML; MG/100ML; MG/100ML; MG/100ML
125 INJECTION, SOLUTION INTRAVENOUS CONTINUOUS
Status: CANCELLED | OUTPATIENT
Start: 2021-04-30

## 2021-04-30 RX ORDER — GABAPENTIN 300 MG/1
300 CAPSULE ORAL ONCE
Status: CANCELLED | OUTPATIENT
Start: 2021-04-30 | End: 2021-04-30

## 2021-04-30 NOTE — PROGRESS NOTES
Assessment:  1  Primary osteoarthritis of right knee     2  Chronic pain of right knee         Plan:  The patient will be scheduled for right total knee arthroplasty  We feel the patient will find significant relief per current symptoms, findings on imaging and exam   Risks, benefits, precautions and expectations were discussed  Risks include blood loss, potential infection and blood clots  Preoperative vitamins were prescribed  The patient should follow up after surgery  To do next visit:  Return for post-op visit   The above stated was discussed in layman's terms and the patient expressed understanding  All questions were answered to the patient's satisfaction  Scribe Attestation    I,:  Leena Arechiga am acting as a scribe while in the presence of the attending physician :       I,:  Dave Sharma MD personally performed the services described in this documentation    as scribed in my presence :             Subjective:   Holly Abdi is a 82 y o  male who presents for follow up of right knee  He is s/p right knee Euflexxa injections with some benefit  Today he complains of occasional right generalized knee pain  Prolonged weight bearing and repetitive bending aggravates while rest alleviates  He has ahd steroid injections with limited benefit  Review of systems negative unless otherwise specified in HPI    Past Medical History:   Diagnosis Date    Hypertension     Sleep apnea     no CPAP        Past Surgical History:   Procedure Laterality Date    CARPAL TUNNEL RELEASE      [place] ARTHROCENTESIS ASPIR&/INJ MAJOR JT/BURSA W/O US  6/14/2018    Procedure: ASPIRATION RIGHT KNEE BURSA;   Surgeon: Dave Sharma MD;  [facility];  Service: Orthopedics    [place] TOTAL KNEE ARTHROPLASTY Left 6/14/2018    Procedure: ARTHROPLASTY KNEE TOTAL;  Surgeon: Dave Sharma MD;  [facility];  Service: Orthopedics       Family History   Problem Relation Age of Onset    No Known Problems Mother     No Known Problems Father        Social History     Occupational History    Not on file   Tobacco Use    Smoking status: Former Smoker    Smokeless tobacco: Never Used    Tobacco comment: quit > 40 years ago    Substance and Sexual Activity    Alcohol use: No    Drug use: No    Sexual activity: Not on file         Current Outpatient Medications:     aspirin 81 mg chewable tablet, Chew, Disp: , Rfl:     lisinopril-hydrochlorothiazide (PRINZIDE,ZESTORETIC) 20-25 MG per tablet, Take 1 tablet by mouth daily, Disp: , Rfl:     LUMIGAN 0 01 % ophthalmic drops, , Disp: , Rfl:     No Known Allergies         Vitals:    04/30/21 1503   BP: 146/87   Pulse: 79       Objective:  Physical exam  · General: Awake, Alert, Oriented  · Eyes: Pupils equal, round and reactive to light  · Heart: regular rate and rhythm  · Lungs: No audible wheezing  · Abdomen: soft                    Ortho Exam  Right knee:  Varus alignment  No erythema or ecchymosis  No effusion or swelling  Normal strength  Good ROM with crepitus   Calf compartments soft and supple  Sensation intact  Toes are warm sensate and mobile        Diagnostics, reviewed and taken today if performed as documented:    None performed     Procedures, if performed today:    Procedures    None performed      Portions of the record may have been created with voice recognition software  Occasional wrong word or "sound a like" substitutions may have occurred due to the inherent limitations of voice recognition software  Read the chart carefully and recognize, using context, where substitutions have occurred

## 2021-05-04 RX ORDER — SODIUM CHLORIDE, SODIUM LACTATE, POTASSIUM CHLORIDE, CALCIUM CHLORIDE 600; 310; 30; 20 MG/100ML; MG/100ML; MG/100ML; MG/100ML
125 INJECTION, SOLUTION INTRAVENOUS CONTINUOUS
Status: CANCELLED | OUTPATIENT
Start: 2021-05-04

## 2021-05-04 RX ORDER — CHLORHEXIDINE GLUCONATE 0.12 MG/ML
15 RINSE ORAL ONCE
Status: CANCELLED | OUTPATIENT
Start: 2021-05-04 | End: 2021-05-04

## 2021-05-05 ENCOUNTER — PREP FOR PROCEDURE (OUTPATIENT)
Dept: OBGYN CLINIC | Facility: CLINIC | Age: 86
End: 2021-05-05

## 2021-05-20 NOTE — PRE-PROCEDURE INSTRUCTIONS
Pre-Surgery Instructions:   Medication Instructions    ascorbic acid (VITAMIN C) 500 MG tablet pt instructed to not take on day of surgery    aspirin 81 mg chewable tablet pt instructed to stop one week prior to surgery    ferrous sulfate 324 (65 Fe) mg pt instructed to not take on day of surgery    folic acid (FOLVITE) 1 mg tablet pt instructed to not take on day of surgery     lisinopril-hydrochlorothiazide (PRINZIDE,ZESTORETIC) 20-25 MG per tablet pt instructed to not take on day of surgery     LUMIGAN 0 01 % ophthalmic drops pt uses at hs     Pt denies fever, sob, sore throat and cough  Pt verbalized understanding of COVID visitor, IS, shower and genesis cloth instructions  Pt instructed to stop nsaids one week prior to surgery

## 2021-05-21 ENCOUNTER — APPOINTMENT (OUTPATIENT)
Dept: LAB | Facility: MEDICAL CENTER | Age: 86
End: 2021-05-21
Payer: COMMERCIAL

## 2021-05-21 ENCOUNTER — LAB REQUISITION (OUTPATIENT)
Dept: LAB | Facility: HOSPITAL | Age: 86
End: 2021-05-21
Payer: COMMERCIAL

## 2021-05-21 VITALS
SYSTOLIC BLOOD PRESSURE: 146 MMHG | HEART RATE: 79 BPM | BODY MASS INDEX: 39.44 KG/M2 | DIASTOLIC BLOOD PRESSURE: 87 MMHG | HEIGHT: 72 IN | WEIGHT: 291.2 LBS

## 2021-05-21 DIAGNOSIS — M17.11 PRIMARY OSTEOARTHRITIS OF RIGHT KNEE: ICD-10-CM

## 2021-05-21 DIAGNOSIS — M17.11 UNILATERAL PRIMARY OSTEOARTHRITIS, RIGHT KNEE: ICD-10-CM

## 2021-05-21 LAB
ABO GROUP BLD: NORMAL
ALBUMIN SERPL BCP-MCNC: 4 G/DL (ref 3.5–5)
ALP SERPL-CCNC: 64 U/L (ref 46–116)
ALT SERPL W P-5'-P-CCNC: 29 U/L (ref 12–78)
ANION GAP SERPL CALCULATED.3IONS-SCNC: 5 MMOL/L (ref 4–13)
APTT PPP: 30 SECONDS (ref 23–37)
AST SERPL W P-5'-P-CCNC: 20 U/L (ref 5–45)
BASOPHILS # BLD AUTO: 0.06 THOUSANDS/ΜL (ref 0–0.1)
BASOPHILS NFR BLD AUTO: 1 % (ref 0–1)
BILIRUB SERPL-MCNC: 1.07 MG/DL (ref 0.2–1)
BLD GP AB SCN SERPL QL: NEGATIVE
BUN SERPL-MCNC: 31 MG/DL (ref 5–25)
CALCIUM SERPL-MCNC: 10.1 MG/DL (ref 8.3–10.1)
CHLORIDE SERPL-SCNC: 108 MMOL/L (ref 100–108)
CO2 SERPL-SCNC: 27 MMOL/L (ref 21–32)
CREAT SERPL-MCNC: 1.48 MG/DL (ref 0.6–1.3)
CRP SERPL QL: <3 MG/L
EOSINOPHIL # BLD AUTO: 0.16 THOUSAND/ΜL (ref 0–0.61)
EOSINOPHIL NFR BLD AUTO: 4 % (ref 0–6)
ERYTHROCYTE [DISTWIDTH] IN BLOOD BY AUTOMATED COUNT: 12.7 % (ref 11.6–15.1)
EST. AVERAGE GLUCOSE BLD GHB EST-MCNC: 120 MG/DL
GFR SERPL CREATININE-BSD FRML MDRD: 43 ML/MIN/1.73SQ M
GLUCOSE P FAST SERPL-MCNC: 101 MG/DL (ref 65–99)
HBA1C MFR BLD: 5.8 %
HCT VFR BLD AUTO: 45 % (ref 36.5–49.3)
HGB BLD-MCNC: 14.8 G/DL (ref 12–17)
IMM GRANULOCYTES # BLD AUTO: 0 THOUSAND/UL (ref 0–0.2)
IMM GRANULOCYTES NFR BLD AUTO: 0 % (ref 0–2)
INR PPP: 0.99 (ref 0.84–1.19)
LYMPHOCYTES # BLD AUTO: 1.18 THOUSANDS/ΜL (ref 0.6–4.47)
LYMPHOCYTES NFR BLD AUTO: 27 % (ref 14–44)
MCH RBC QN AUTO: 33.1 PG (ref 26.8–34.3)
MCHC RBC AUTO-ENTMCNC: 32.9 G/DL (ref 31.4–37.4)
MCV RBC AUTO: 101 FL (ref 82–98)
MONOCYTES # BLD AUTO: 0.42 THOUSAND/ΜL (ref 0.17–1.22)
MONOCYTES NFR BLD AUTO: 10 % (ref 4–12)
NEUTROPHILS # BLD AUTO: 2.54 THOUSANDS/ΜL (ref 1.85–7.62)
NEUTS SEG NFR BLD AUTO: 58 % (ref 43–75)
NRBC BLD AUTO-RTO: 0 /100 WBCS
PLATELET # BLD AUTO: 191 THOUSANDS/UL (ref 149–390)
PMV BLD AUTO: 10.2 FL (ref 8.9–12.7)
POTASSIUM SERPL-SCNC: 5 MMOL/L (ref 3.5–5.3)
PROT SERPL-MCNC: 7.6 G/DL (ref 6.4–8.2)
PROTHROMBIN TIME: 13.1 SECONDS (ref 11.6–14.5)
RBC # BLD AUTO: 4.47 MILLION/UL (ref 3.88–5.62)
RH BLD: POSITIVE
SODIUM SERPL-SCNC: 140 MMOL/L (ref 136–145)
SPECIMEN EXPIRATION DATE: NORMAL
WBC # BLD AUTO: 4.36 THOUSAND/UL (ref 4.31–10.16)

## 2021-05-21 PROCEDURE — 83036 HEMOGLOBIN GLYCOSYLATED A1C: CPT

## 2021-05-21 PROCEDURE — 86900 BLOOD TYPING SEROLOGIC ABO: CPT | Performed by: ORTHOPAEDIC SURGERY

## 2021-05-21 PROCEDURE — 36415 COLL VENOUS BLD VENIPUNCTURE: CPT

## 2021-05-21 PROCEDURE — 86901 BLOOD TYPING SEROLOGIC RH(D): CPT | Performed by: ORTHOPAEDIC SURGERY

## 2021-05-21 PROCEDURE — 80053 COMPREHEN METABOLIC PANEL: CPT

## 2021-05-21 PROCEDURE — 85610 PROTHROMBIN TIME: CPT

## 2021-05-21 PROCEDURE — 85730 THROMBOPLASTIN TIME PARTIAL: CPT

## 2021-05-21 PROCEDURE — 85025 COMPLETE CBC W/AUTO DIFF WBC: CPT

## 2021-05-21 PROCEDURE — 86850 RBC ANTIBODY SCREEN: CPT | Performed by: ORTHOPAEDIC SURGERY

## 2021-05-21 PROCEDURE — 86140 C-REACTIVE PROTEIN: CPT

## 2021-05-24 ENCOUNTER — ANESTHESIA EVENT (OUTPATIENT)
Dept: PERIOP | Facility: HOSPITAL | Age: 86
DRG: 470 | End: 2021-05-24
Payer: COMMERCIAL

## 2021-05-27 ENCOUNTER — TELEPHONE (OUTPATIENT)
Dept: OBGYN CLINIC | Facility: HOSPITAL | Age: 86
End: 2021-05-27

## 2021-05-27 NOTE — TELEPHONE ENCOUNTER
Preoperative Elective Admission Assessment- spoke to patient    Living Situation: Resides at home with his wife, Juliana Montemayor  In a [place] style home with a walk In shower, with grab bars  Steps: 1 to enter    First Floor Setup: Has first floor bed and bathroom  DME: Pt has both a cane and RW, denies BSC    Patient's Current Level of Function: independent with ambulation and ADLs    Post-op Caregiver: Wife    Post-op Transport: Wife    Outpatient Physical Therapy Site: [facility]- already scheduled     Medication Management: Pts wife manages daily medications putting them into a pillbox  Preferred Pharmacy for Post-op Medications:  [facility]                    Blood Management Vitamin Regimen: Pt reports taking folic acid, vitamin C and Iron daily  Post-op anticoagulant: Pt reports postoperative lovenox is at home ready for after surgery use  Discharge Plan: Pt educated that our goal, if at all possible, is to appropriately discharge patient based off their post-op function while striving to maintain maximal independence  If possible, the goal is to discharge patient to home and for them to attend outpatient physical therapy  Barriers to DC identified preoperatively:  *none identified, previous TKA     BMI: 39 49     Caresense:Pt denied    Patient Education: Pt educated on post op pain, early mobilization (POD0), average LOS, and goals for outpatient PT pt encouraged to call me with questions, concerns or issues

## 2021-06-02 ENCOUNTER — EVALUATION (OUTPATIENT)
Dept: PHYSICAL THERAPY | Facility: CLINIC | Age: 86
End: 2021-06-02
Payer: COMMERCIAL

## 2021-06-02 DIAGNOSIS — M25.561 CHRONIC PAIN OF RIGHT KNEE: Primary | ICD-10-CM

## 2021-06-02 DIAGNOSIS — M17.11 PRIMARY OSTEOARTHRITIS OF RIGHT KNEE: ICD-10-CM

## 2021-06-02 DIAGNOSIS — G89.29 CHRONIC PAIN OF RIGHT KNEE: Primary | ICD-10-CM

## 2021-06-02 PROCEDURE — 97161 PT EVAL LOW COMPLEX 20 MIN: CPT | Performed by: PHYSICAL MEDICINE & REHABILITATION

## 2021-06-02 NOTE — PROGRESS NOTES
PT Evaluation     Today's date: 2021  Patient name: Kip Sanabria  : 1935  MRN: [id number]  Referring provider: Tena Foster MD  Dx:   Encounter Diagnosis     ICD-10-CM    1  Chronic pain of right knee  M25 561     G89 29                   Assessment  Assessment details: Pt is a pleasant 82 y o  male presenting to outpatient physical therapy with pain, decreased range of motion, decreased strength, and decreased tolerance to activity secondary to R knee OA  Patient supplied with initial post-op HEP and demonstrates good understanding  Patient is without additional questions/concerns  Pt is a good candidate for outpatient physical therapy following surgery and would benefit from skilled physical therapy to address limitations and to achieve goals  Thank you for this referral    Impairments: abnormal coordination, abnormal or restricted ROM, activity intolerance, impaired physical strength and pain with function  Understanding of Dx/Px/POC: good   Prognosis: good    Goals  ST  Patient will report 25% decrease in pain in 4 weeks  2  Patient will demonstrate 25% improvement in ROM in 4 weeks  3  Patient will demonstrate 1/2 grade improvement in strength in 4 weeks  LT  Patient will be able to perform IADLS without restriction or pain by discharge  2  Patient will be independent in HEP by discharge  3  Patient will be able to return to recreational/work duties without restriction or pain by discharge        Plan  Patient would benefit from: PT eval and skilled PT  Planned modality interventions: cryotherapy and thermotherapy: hydrocollator packs  Planned therapy interventions: IADL retraining, body mechanics training, flexibility, functional ROM exercises, home exercise program, neuromuscular re-education, manual therapy, postural training, strengthening, stretching, therapeutic activities, therapeutic exercise and joint mobilization  Frequency: 2x week  Duration in visits: 12  Duration in weeks: 6  Treatment plan discussed with: patient        Subjective Evaluation    History of Present Illness  Date of surgery: 6/16/21  Mechanism of injury: Patient presents for pre-op evaluation for R knee pain, R TKA scheduled 6/16  Patient underwent L TKA in 2018  Patient has all assistive devices and home adjustments in place and is not concerned with home access post surgery  "Some pain off and on, sometimes it clicks sometimes it doesn't " Patient denies N/T, swelling, instability  Limited walking tolerance secondary to pain  No medication for pain, seated rest helps to relieve sxs           Objective     Active Range of Motion   Left Knee   Flexion: 110 degrees   Extension: 0 degrees     Right Knee   Flexion: 118 degrees   Extension: -4 degrees     Passive Range of Motion     Right Knee   Flexion: 120 degrees   Extension: 0 degrees     Strength/Myotome Testing     Right Knee   Flexion: 4+  Extension: 4+  Quadriceps contraction: good    Additional Strength Details  Hip flexion: 4/5 with increased hip ER    Ankle strength grossly WFL, (+) peripheral neuropathy    General Comments:      Knee Comments  TTP over medial joint line, inferiorly over proximal tibia  Mild hypomobility with patellar tracking         Precautions: R TKA 6/16/2021, L TKA 2018    Manuals             R knee PROM                                                    Neuro Re-Ed             QS HEP            Weight shift                                                                              Ther Ex             Bike/nustep             HR HEP            Step up             STS             LAQ/SAQ HEP            Heel slides             Standing HS curl HEP            Lateral stepping             Ther Activity                                       Gait Training             W/ RW or SPC                          Modalities

## 2021-06-03 ENCOUNTER — CONSULT (OUTPATIENT)
Dept: NEPHROLOGY | Facility: CLINIC | Age: 86
End: 2021-06-03
Payer: COMMERCIAL

## 2021-06-03 VITALS
WEIGHT: 296.5 LBS | SYSTOLIC BLOOD PRESSURE: 128 MMHG | DIASTOLIC BLOOD PRESSURE: 78 MMHG | HEART RATE: 78 BPM | BODY MASS INDEX: 40.16 KG/M2 | HEIGHT: 72 IN

## 2021-06-03 DIAGNOSIS — I10 ESSENTIAL HYPERTENSION: ICD-10-CM

## 2021-06-03 DIAGNOSIS — M17.11 PRIMARY OSTEOARTHRITIS OF RIGHT KNEE: ICD-10-CM

## 2021-06-03 DIAGNOSIS — E66.01 CLASS 3 SEVERE OBESITY DUE TO EXCESS CALORIES WITHOUT SERIOUS COMORBIDITY WITH BODY MASS INDEX (BMI) OF 40.0 TO 44.9 IN ADULT (HCC): ICD-10-CM

## 2021-06-03 DIAGNOSIS — N18.31 STAGE 3A CHRONIC KIDNEY DISEASE (HCC): Primary | ICD-10-CM

## 2021-06-03 PROBLEM — E66.813 CLASS 3 SEVERE OBESITY DUE TO EXCESS CALORIES WITHOUT SERIOUS COMORBIDITY WITH BODY MASS INDEX (BMI) OF 40.0 TO 44.9 IN ADULT (HCC): Status: ACTIVE | Noted: 2021-06-03

## 2021-06-03 PROCEDURE — 99204 OFFICE O/P NEW MOD 45 MIN: CPT | Performed by: INTERNAL MEDICINE

## 2021-06-03 PROCEDURE — 1036F TOBACCO NON-USER: CPT | Performed by: INTERNAL MEDICINE

## 2021-06-03 PROCEDURE — 1160F RVW MEDS BY RX/DR IN RCRD: CPT | Performed by: INTERNAL MEDICINE

## 2021-06-03 NOTE — PROGRESS NOTES
OFFICE CONSULT - Nephrology   Jayme Ortiz 82 y o  male MRN: [id number]        ASSESSMENT and PLAN:  Brandon Najera was seen today for consult  Diagnoses and all orders for this visit:    Stage 3a chronic kidney disease ([address] )  -     Urinalysis with microscopic; Future  -     Microalbumin / creatinine urine ratio; Future  -     Renal function panel; Future    Essential hypertension    Primary osteoarthritis of right knee    Class 3 severe obesity due to excess calories without serious comorbidity with body mass index (BMI) of 40 0 to 44 9 in adult [facility])        This is an 73-year-old gentleman with history hypertension, obesity, osteoarthritis of the right knee, mild chronic kidney disease with previous creatinine around 1 2-1 3 since 2018 was referred to our office for evaluation prior to right knee arthroplasty  1  Chronic kidney disease stage 3 with previous creatinine around 1 2-1 3 since 2018, noted most recent creatinine 1 48 with an estimated GFR of 43  Advised increased fluid intake  Follow low-salt diet  Avoid NSAIDs  Follow repeat blood and urine test early next week, if his renal function remains stable okay to proceed with orthopedic surgery  2  Right knee osteoarthritis plan for right knee arthroplasty on 06/16  Follow repeat labs next week to make sure kidney function remains stable  Recommend to hold lisinopril with HCTZ on the day of procedure  Avoid relative hypotension  Avoid nephrotoxins including NSAIDs  Follow daily blood test     3  Hypertension, blood pressure currently well controlled on lisinopril and HCTZ  As above recommend to hold HCTZ and on the day of procedure  Follow low-salt diet  Advised to lose weight  4  Morbid obesity, advised to lose weight  Patient Instructions   As we discussed in the office visit, I want you to have a repeat blood and urine test early next week, will contact you with the results    Remember to stay well-hydrated try to drink at least 6 to 8 glasses of water a day  Follow low-salt diet  If your kidney function remains stable okay to proceed with surgery  Once we told you that it is okay to proceed with surgery, we recommend to hold your blood pressure medication (lisinopril with HCTZ) on the day of the procedure  Avoid NSAIDs (no ibuprofen, Motrin, Advil, Aleve, naproxen)  Okay to take Tylenol or paracetamol or acetaminophen as needed for pain  Continue regular follow-up primary care doctor  Follow-up as needed in 6 months          HPI:  Sariah Chambers is a 82 y o male who was referred by Dr Villalobos found for evaluation of Consult    Is a patient with hypertension, morbid obesity, right knee osteoarthritis, with scheduled for a right knee arthroplasty 6/16, recent blood test postoperatively showed a creatinine of 1 48 for that reason was referred to Nephrology  Review of previous records shows a creatinine around 1 2-1 3 since 2018  Patient today presents in the office with his wife  In general doing okay other than chronic right knee pain  Denies any chest pain, shortness of breath, no leg edema  Denies any abdominal pain, no nausea, no vomiting, no diarrhea or constipation  Denies any urinary problems other than nocturia once at night, no burning sensation, no gross hematuria  Denies tobacco abuse, he quit smoking 50 years ago  Denies NSAID use  Denies family history of kidney disease  I personally spent over half of a total 35 minutes face to face with the patient in counseling and discussion and/or coordination of care as described above  ROS: All the systems were reviewed and were negative except as documented on the HPI  Allergies: Patient has no known allergies      Medications:   Current Outpatient Medications:     ascorbic acid (VITAMIN C) 500 MG tablet, Take 1 tablet (500 mg total) by mouth daily Start 30 days prior to surgery, Disp: 30 tablet, Rfl: 0    aspirin 81 mg chewable tablet, Chew daily , Disp: , Rfl:    ferrous sulfate 324 (65 Fe) mg, Take 1 tablet (324 mg total) by mouth 2 (two) times a day before meals Start 30 days prior to surgery, Disp: 60 tablet, Rfl: 0    folic acid (FOLVITE) 1 mg tablet, Take 1 tablet (1 mg total) by mouth daily Start 30 days prior to surgery, Disp: 30 tablet, Rfl: 0    lisinopril-hydrochlorothiazide (PRINZIDE,ZESTORETIC) 20-25 MG per tablet, Take 1 tablet by mouth daily, Disp: , Rfl:     LUMIGAN 0 01 % ophthalmic drops, Administer into the left eye daily at bedtime , Disp: , Rfl:     COVID-19 mRNA Virus Vaccine (MODERNA COVID-19 VACCINE IM), Inject into a muscle Pt had both covid vaccines, Disp: , Rfl:     enoxaparin (LOVENOX) 40 mg/0 4 mL, Inject 0 4 mL (40 mg total) under the skin daily (Patient not taking: Reported on 6/3/2021), Disp: 28 mL, Rfl: 0    Past Medical History:   Diagnosis Date    Hypertension     Sleep apnea     no CPAP      Past Surgical History:   Procedure Laterality Date    CARPAL TUNNEL RELEASE      COLONOSCOPY      MO ARTHROCENTESIS ASPIR&/INJ MAJOR JT/BURSA W/O US  6/14/2018    Procedure: ASPIRATION RIGHT KNEE BURSA; Surgeon: Brigette Estes MD;  [facility];  Service: Orthopedics    MO TOTAL KNEE ARTHROPLASTY Left 6/14/2018    Procedure: ARTHROPLASTY KNEE TOTAL;  Surgeon: Brigette Estes MD;  [facility];  Service: Orthopedics     Family History   Problem Relation Age of Onset    No Known Problems Mother     No Known Problems Father       reports that he has quit smoking  He has never used smokeless tobacco  He reports that he does not drink alcohol or use drugs  Physical Exam:   Vitals:    06/03/21 0950 06/03/21 1015   BP:  128/78   BP Location:  Left arm   Patient Position:  Sitting   Cuff Size:  Standard   Pulse:  78   Weight: 134 kg (296 lb 8 oz)    Height: 6' (1 829 m)      Body mass index is 40 21 kg/m²      General: conscious, cooperative, in not acute distress  Eyes: conjunctivae pink, anicteric sclerae  ENT: lips and mucous membranes moist  Neck: supple, no JVD  Chest: clear breath sounds bilateral, no crackles, ronchus or wheezings  CVS: distinct S1 & S2, normal rate, regular rhythm  Abdomen: obese, non-tender, non-distended, normoactive bowel sounds  Back: no CVA tenderness  Extremities: no edema of both legs  Skin: no rash  Neuro: awake, alert, oriented      Laboratory Results:  Lab Results   Component Value Date    WBC 4 36 05/21/2021    HGB 14 8 05/21/2021    HCT 45 0 05/21/2021     (H) 05/21/2021     05/21/2021     Lab Results   Component Value Date    SODIUM 140 05/21/2021    K 5 0 05/21/2021     05/21/2021    CO2 27 05/21/2021    BUN 31 (H) 05/21/2021    CREATININE 1 48 (H) 05/21/2021    GLUC 138 06/22/2018    CALCIUM 10 1 05/21/2021               Portions of the record may have been created with voice recognition software  Occasional wrong word or "sound a like" substitutions may have occurred due to the inherent limitations of voice recognition software  Read the chart carefully and recognize, using context, where substitutions have occurred  If you have any questions, please contact the dictating provider

## 2021-06-03 NOTE — PATIENT INSTRUCTIONS
As we discussed in the office visit, I want you to have a repeat blood and urine test early next week, will contact you with the results  Remember to stay well-hydrated try to drink at least 6 to 8 glasses of water a day  Follow low-salt diet  If your kidney function remains stable okay to proceed with surgery  Once we told you that it is okay to proceed with surgery, we recommend to hold your blood pressure medication (lisinopril with HCTZ) on the day of the procedure  Avoid NSAIDs (no ibuprofen, Motrin, Advil, Aleve, naproxen)  Okay to take Tylenol or paracetamol or acetaminophen as needed for pain    Continue regular follow-up primary care doctor  Follow-up as needed in 6 months

## 2021-06-04 ENCOUNTER — TELEPHONE (OUTPATIENT)
Dept: OBGYN CLINIC | Facility: MEDICAL CENTER | Age: 86
End: 2021-06-04

## 2021-06-07 ENCOUNTER — APPOINTMENT (OUTPATIENT)
Dept: LAB | Facility: MEDICAL CENTER | Age: 86
End: 2021-06-07
Payer: COMMERCIAL

## 2021-06-07 DIAGNOSIS — N18.31 STAGE 3A CHRONIC KIDNEY DISEASE (HCC): ICD-10-CM

## 2021-06-07 LAB
ALBUMIN SERPL BCP-MCNC: 4 G/DL (ref 3.5–5)
ANION GAP SERPL CALCULATED.3IONS-SCNC: 6 MMOL/L (ref 4–13)
BACTERIA UR QL AUTO: ABNORMAL /HPF
BILIRUB UR QL STRIP: NEGATIVE
BUN SERPL-MCNC: 25 MG/DL (ref 5–25)
CALCIUM SERPL-MCNC: 10.1 MG/DL (ref 8.3–10.1)
CHLORIDE SERPL-SCNC: 106 MMOL/L (ref 100–108)
CLARITY UR: CLEAR
CO2 SERPL-SCNC: 28 MMOL/L (ref 21–32)
COLOR UR: YELLOW
CREAT SERPL-MCNC: 1.31 MG/DL (ref 0.6–1.3)
CREAT UR-MCNC: 87.1 MG/DL
GFR SERPL CREATININE-BSD FRML MDRD: 50 ML/MIN/1.73SQ M
GLUCOSE P FAST SERPL-MCNC: 106 MG/DL (ref 65–99)
GLUCOSE UR STRIP-MCNC: NEGATIVE MG/DL
HGB UR QL STRIP.AUTO: NEGATIVE
HYALINE CASTS #/AREA URNS LPF: ABNORMAL /LPF
KETONES UR STRIP-MCNC: NEGATIVE MG/DL
LEUKOCYTE ESTERASE UR QL STRIP: ABNORMAL
MICROALBUMIN UR-MCNC: 5.6 MG/L (ref 0–20)
MICROALBUMIN/CREAT 24H UR: 6 MG/G CREATININE (ref 0–30)
NITRITE UR QL STRIP: NEGATIVE
NON-SQ EPI CELLS URNS QL MICRO: ABNORMAL /HPF
PH UR STRIP.AUTO: 6 [PH]
PHOSPHATE SERPL-MCNC: 3.4 MG/DL (ref 2.3–4.1)
POTASSIUM SERPL-SCNC: 4.9 MMOL/L (ref 3.5–5.3)
PROT UR STRIP-MCNC: NEGATIVE MG/DL
RBC #/AREA URNS AUTO: ABNORMAL /HPF
SODIUM SERPL-SCNC: 140 MMOL/L (ref 136–145)
SP GR UR STRIP.AUTO: 1.01 (ref 1–1.03)
UROBILINOGEN UR QL STRIP.AUTO: 0.2 E.U./DL
WBC #/AREA URNS AUTO: ABNORMAL /HPF

## 2021-06-07 PROCEDURE — 82570 ASSAY OF URINE CREATININE: CPT

## 2021-06-07 PROCEDURE — 81001 URINALYSIS AUTO W/SCOPE: CPT

## 2021-06-07 PROCEDURE — 80069 RENAL FUNCTION PANEL: CPT

## 2021-06-07 PROCEDURE — 36415 COLL VENOUS BLD VENIPUNCTURE: CPT

## 2021-06-07 PROCEDURE — 82043 UR ALBUMIN QUANTITATIVE: CPT

## 2021-06-15 NOTE — PROGRESS NOTES
Daily Note     Today's date: 2018  Patient name: Juvencio Hernandez  : 1935  MRN: [id number]  Referring provider: Jihan Nance MD  Dx:   Encounter Diagnosis     ICD-10-CM    1  Primary osteoarthritis of left knee M17 12    2  Chronic pain of left knee M25 562     G89 29    3  History of total left knee replacement Z96 652                   Subjective: Pt states he has had a sore on his shin bone for "maybe a month"; reports he saw an MD "about a week ago" and the MD stated it was not infected  Pt reports he has an MDV w/ Dr Merlene Garcia tomorrow        Objective: See treatment diary below    Manual           PROM (flexion seated, ext supine) 10'  10'  10'  10'  10'  10'  10'                                                                                                               Exercise Diary           bike 6' ROM 6' rom  6'  6'  6'  6'  6'         Heel slides 10x10" 10x10"  10x10"    10x10"  np           SLR 3# 3x10 3# 3x10 3# 3x10  4# 2x10  NP NV  4# 3x10         Quad sets 10x10" 10x10"  10x10"    NP np           LAQ 5# 3x10 5# 3x10 5# 3x10  33# 2x10 44# 3x10  44# 2x10 44# 3x10         Ham curl machine          44# 4x10 44# 2x10 44# 3x10         Leg press  110# 2x20 110# 2x20  120# 2x20  120# 2x20  130#     120# 2x20  130# 3x10         Heel raises  3x10 3x10  3x10 3x10   3x10   np  np         Standing hip abd 3# 3x10 3# 3x10 3# 3x10  4# 2x10  5# 2x10 5# 2x10 ea 5# 3x10 ea         Standing hip ext 3# 3x10 3# 3x10 3# 3x10  4# 2x10  5# 2x10  5# 2x10ea 5# 3x10 ea         Step ups -lateral  np 6" x10  np np  np  np  np         Up and over   6" x20 6" x20 6" x20  8" x10  8" x10  8" x20 8" x20                                  SLS    10"x3  10"x3  NP    np  15"x3                                                                                                                                       modalities      VM to Rashida, request call back to discuss f/u with Dr Tijerina and scheduling to see Dr Garza.            10'  10'  10'  10'  np  10'                                                             Assessment: Tolerated treatment well  Patient would benefit from continued PT  Passive knee extension continues to be limited, uncomfortable  Plan: Progress treatment as tolerated

## 2021-06-16 ENCOUNTER — ANESTHESIA (OUTPATIENT)
Dept: PERIOP | Facility: HOSPITAL | Age: 86
DRG: 470 | End: 2021-06-16
Payer: COMMERCIAL

## 2021-06-16 ENCOUNTER — HOSPITAL ENCOUNTER (INPATIENT)
Facility: HOSPITAL | Age: 86
LOS: 1 days | Discharge: HOME | DRG: 470 | End: 2021-06-17
Attending: ORTHOPAEDIC SURGERY | Admitting: ORTHOPAEDIC SURGERY
Payer: COMMERCIAL

## 2021-06-16 DIAGNOSIS — I10 ESSENTIAL HYPERTENSION: ICD-10-CM

## 2021-06-16 DIAGNOSIS — Z96.651 S/P TKR (TOTAL KNEE REPLACEMENT), RIGHT: Primary | ICD-10-CM

## 2021-06-16 DIAGNOSIS — N18.31 STAGE 3A CHRONIC KIDNEY DISEASE (HCC): ICD-10-CM

## 2021-06-16 LAB
ABO GROUP BLD: NORMAL
BLD GP AB SCN SERPL QL: NEGATIVE
GLUCOSE SERPL-MCNC: 100 MG/DL (ref 65–140)
RH BLD: POSITIVE
SPECIMEN EXPIRATION DATE: NORMAL

## 2021-06-16 PROCEDURE — 27447 TOTAL KNEE ARTHROPLASTY: CPT | Performed by: ORTHOPAEDIC SURGERY

## 2021-06-16 PROCEDURE — C1776 JOINT DEVICE (IMPLANTABLE): HCPCS | Performed by: ORTHOPAEDIC SURGERY

## 2021-06-16 PROCEDURE — 99222 1ST HOSP IP/OBS MODERATE 55: CPT | Performed by: INTERNAL MEDICINE

## 2021-06-16 PROCEDURE — C9290 INJ, BUPIVACAINE LIPOSOME: HCPCS | Performed by: STUDENT IN AN ORGANIZED HEALTH CARE EDUCATION/TRAINING PROGRAM

## 2021-06-16 PROCEDURE — C1713 ANCHOR/SCREW BN/BN,TIS/BN: HCPCS | Performed by: ORTHOPAEDIC SURGERY

## 2021-06-16 PROCEDURE — 97163 PT EVAL HIGH COMPLEX 45 MIN: CPT

## 2021-06-16 PROCEDURE — 86850 RBC ANTIBODY SCREEN: CPT | Performed by: ORTHOPAEDIC SURGERY

## 2021-06-16 PROCEDURE — 86901 BLOOD TYPING SEROLOGIC RH(D): CPT | Performed by: ORTHOPAEDIC SURGERY

## 2021-06-16 PROCEDURE — 99254 IP/OBS CNSLTJ NEW/EST MOD 60: CPT | Performed by: NURSE PRACTITIONER

## 2021-06-16 PROCEDURE — NC001 PR NO CHARGE: Performed by: ORTHOPAEDIC SURGERY

## 2021-06-16 PROCEDURE — 82948 REAGENT STRIP/BLOOD GLUCOSE: CPT

## 2021-06-16 PROCEDURE — 86900 BLOOD TYPING SEROLOGIC ABO: CPT | Performed by: ORTHOPAEDIC SURGERY

## 2021-06-16 DEVICE — ATTUNE PATELLA MEDIALIZED DOME 41MM CEMENTED AOX
Type: IMPLANTABLE DEVICE | Site: KNEE | Status: FUNCTIONAL
Brand: ATTUNE

## 2021-06-16 DEVICE — ATTUNE KNEE SYSTEM TIBIAL INSERT FIXED BEARING POSTERIOR STABILIZED 8 7MM AOX
Type: IMPLANTABLE DEVICE | Site: KNEE | Status: FUNCTIONAL
Brand: ATTUNE

## 2021-06-16 DEVICE — SMARTSET HV HIGH VISCOSITY BONE CEMENT 40G
Type: IMPLANTABLE DEVICE | Site: KNEE | Status: FUNCTIONAL
Brand: SMARTSET

## 2021-06-16 DEVICE — ATTUNE KNEE SYSTEM TIBIAL BASE FIXED BEARING SIZE 8 CEMENTED
Type: IMPLANTABLE DEVICE | Site: KNEE | Status: FUNCTIONAL
Brand: ATTUNE

## 2021-06-16 DEVICE — ATTUNE KNEE SYSTEM FEMORAL POSTERIOR STABILIZED SIZE 8 RIGHT CEMENTED
Type: IMPLANTABLE DEVICE | Site: KNEE | Status: FUNCTIONAL
Brand: ATTUNE

## 2021-06-16 RX ORDER — ACETAMINOPHEN 325 MG/1
650 TABLET ORAL EVERY 6 HOURS PRN
Status: DISCONTINUED | OUTPATIENT
Start: 2021-06-16 | End: 2021-06-16

## 2021-06-16 RX ORDER — FENTANYL CITRATE/PF 50 MCG/ML
50 SYRINGE (ML) INJECTION
Status: DISCONTINUED | OUTPATIENT
Start: 2021-06-16 | End: 2021-06-16 | Stop reason: HOSPADM

## 2021-06-16 RX ORDER — CHLORHEXIDINE GLUCONATE 0.12 MG/ML
15 RINSE ORAL ONCE
Status: COMPLETED | OUTPATIENT
Start: 2021-06-16 | End: 2021-06-16

## 2021-06-16 RX ORDER — BUPIVACAINE HYDROCHLORIDE 7.5 MG/ML
INJECTION, SOLUTION INTRASPINAL AS NEEDED
Status: DISCONTINUED | OUTPATIENT
Start: 2021-06-16 | End: 2021-06-16

## 2021-06-16 RX ORDER — ONDANSETRON 2 MG/ML
4 INJECTION INTRAMUSCULAR; INTRAVENOUS ONCE AS NEEDED
Status: DISCONTINUED | OUTPATIENT
Start: 2021-06-16 | End: 2021-06-16 | Stop reason: HOSPADM

## 2021-06-16 RX ORDER — CEFAZOLIN SODIUM 1 G/3ML
INJECTION, POWDER, FOR SOLUTION INTRAMUSCULAR; INTRAVENOUS AS NEEDED
Status: DISCONTINUED | OUTPATIENT
Start: 2021-06-16 | End: 2021-06-16

## 2021-06-16 RX ORDER — CALCIUM CARBONATE 200(500)MG
1000 TABLET,CHEWABLE ORAL DAILY PRN
Status: DISCONTINUED | OUTPATIENT
Start: 2021-06-16 | End: 2021-06-17 | Stop reason: HOSPADM

## 2021-06-16 RX ORDER — BUPIVACAINE HYDROCHLORIDE 5 MG/ML
INJECTION, SOLUTION PERINEURAL
Status: COMPLETED | OUTPATIENT
Start: 2021-06-16 | End: 2021-06-16

## 2021-06-16 RX ORDER — SODIUM CHLORIDE, SODIUM LACTATE, POTASSIUM CHLORIDE, CALCIUM CHLORIDE 600; 310; 30; 20 MG/100ML; MG/100ML; MG/100ML; MG/100ML
125 INJECTION, SOLUTION INTRAVENOUS CONTINUOUS
Status: DISCONTINUED | OUTPATIENT
Start: 2021-06-16 | End: 2021-06-16

## 2021-06-16 RX ORDER — HYDROMORPHONE HCL/PF 1 MG/ML
0.5 SYRINGE (ML) INJECTION
Status: DISCONTINUED | OUTPATIENT
Start: 2021-06-16 | End: 2021-06-16 | Stop reason: HOSPADM

## 2021-06-16 RX ORDER — LIDOCAINE HYDROCHLORIDE 20 MG/ML
INJECTION, SOLUTION INFILTRATION; PERINEURAL
Status: COMPLETED | OUTPATIENT
Start: 2021-06-16 | End: 2021-06-16

## 2021-06-16 RX ORDER — ACETAMINOPHEN 325 MG/1
975 TABLET ORAL ONCE
Status: COMPLETED | OUTPATIENT
Start: 2021-06-16 | End: 2021-06-16

## 2021-06-16 RX ORDER — HYDRALAZINE HYDROCHLORIDE 25 MG/1
25 TABLET, FILM COATED ORAL EVERY 8 HOURS PRN
Status: DISCONTINUED | OUTPATIENT
Start: 2021-06-16 | End: 2021-06-17 | Stop reason: HOSPADM

## 2021-06-16 RX ORDER — MIDAZOLAM HYDROCHLORIDE 2 MG/2ML
INJECTION, SOLUTION INTRAMUSCULAR; INTRAVENOUS AS NEEDED
Status: DISCONTINUED | OUTPATIENT
Start: 2021-06-16 | End: 2021-06-16

## 2021-06-16 RX ORDER — DOCUSATE SODIUM 100 MG/1
100 CAPSULE, LIQUID FILLED ORAL 2 TIMES DAILY
Status: DISCONTINUED | OUTPATIENT
Start: 2021-06-16 | End: 2021-06-17 | Stop reason: HOSPADM

## 2021-06-16 RX ORDER — OXYCODONE HYDROCHLORIDE 10 MG/1
10 TABLET ORAL EVERY 4 HOURS PRN
Status: DISCONTINUED | OUTPATIENT
Start: 2021-06-16 | End: 2021-06-17 | Stop reason: HOSPADM

## 2021-06-16 RX ORDER — ALBUMIN, HUMAN INJ 5% 5 %
25 SOLUTION INTRAVENOUS ONCE
Status: COMPLETED | OUTPATIENT
Start: 2021-06-16 | End: 2021-06-16

## 2021-06-16 RX ORDER — ACETAMINOPHEN 325 MG/1
975 TABLET ORAL EVERY 8 HOURS SCHEDULED
Status: DISCONTINUED | OUTPATIENT
Start: 2021-06-16 | End: 2021-06-17 | Stop reason: HOSPADM

## 2021-06-16 RX ORDER — OXYCODONE HYDROCHLORIDE 5 MG/1
5 TABLET ORAL EVERY 4 HOURS PRN
Status: DISCONTINUED | OUTPATIENT
Start: 2021-06-16 | End: 2021-06-17 | Stop reason: HOSPADM

## 2021-06-16 RX ORDER — FENTANYL CITRATE 50 UG/ML
INJECTION, SOLUTION INTRAMUSCULAR; INTRAVENOUS AS NEEDED
Status: DISCONTINUED | OUTPATIENT
Start: 2021-06-16 | End: 2021-06-16

## 2021-06-16 RX ORDER — ASPIRIN 81 MG/1
81 TABLET, CHEWABLE ORAL DAILY
Status: DISCONTINUED | OUTPATIENT
Start: 2021-06-17 | End: 2021-06-17 | Stop reason: HOSPADM

## 2021-06-16 RX ORDER — PROPOFOL 10 MG/ML
INJECTION, EMULSION INTRAVENOUS CONTINUOUS PRN
Status: DISCONTINUED | OUTPATIENT
Start: 2021-06-16 | End: 2021-06-16

## 2021-06-16 RX ORDER — SODIUM CHLORIDE, SODIUM LACTATE, POTASSIUM CHLORIDE, CALCIUM CHLORIDE 600; 310; 30; 20 MG/100ML; MG/100ML; MG/100ML; MG/100ML
INJECTION, SOLUTION INTRAVENOUS CONTINUOUS PRN
Status: DISCONTINUED | OUTPATIENT
Start: 2021-06-16 | End: 2021-06-16

## 2021-06-16 RX ORDER — MAGNESIUM HYDROXIDE 1200 MG/15ML
LIQUID ORAL AS NEEDED
Status: DISCONTINUED | OUTPATIENT
Start: 2021-06-16 | End: 2021-06-16 | Stop reason: HOSPADM

## 2021-06-16 RX ORDER — ALBUMIN, HUMAN INJ 5% 5 %
50 SOLUTION INTRAVENOUS ONCE
Status: DISCONTINUED | OUTPATIENT
Start: 2021-06-16 | End: 2021-06-16

## 2021-06-16 RX ORDER — ONDANSETRON 2 MG/ML
4 INJECTION INTRAMUSCULAR; INTRAVENOUS EVERY 6 HOURS PRN
Status: DISCONTINUED | OUTPATIENT
Start: 2021-06-16 | End: 2021-06-17 | Stop reason: HOSPADM

## 2021-06-16 RX ORDER — CEFAZOLIN SODIUM 2 G/50ML
2000 SOLUTION INTRAVENOUS EVERY 8 HOURS
Status: COMPLETED | OUTPATIENT
Start: 2021-06-16 | End: 2021-06-17

## 2021-06-16 RX ORDER — ONDANSETRON 2 MG/ML
INJECTION INTRAMUSCULAR; INTRAVENOUS AS NEEDED
Status: DISCONTINUED | OUTPATIENT
Start: 2021-06-16 | End: 2021-06-16

## 2021-06-16 RX ORDER — SENNOSIDES 8.6 MG
1 TABLET ORAL DAILY
Status: DISCONTINUED | OUTPATIENT
Start: 2021-06-17 | End: 2021-06-17 | Stop reason: HOSPADM

## 2021-06-16 RX ORDER — GABAPENTIN 300 MG/1
300 CAPSULE ORAL ONCE
Status: COMPLETED | OUTPATIENT
Start: 2021-06-16 | End: 2021-06-16

## 2021-06-16 RX ORDER — OXYCODONE HYDROCHLORIDE 5 MG/1
TABLET ORAL
Qty: 30 TABLET | Refills: 0 | Status: SHIPPED | OUTPATIENT
Start: 2021-06-16 | End: 2021-07-02 | Stop reason: SDUPTHER

## 2021-06-16 RX ORDER — SODIUM CHLORIDE, SODIUM LACTATE, POTASSIUM CHLORIDE, CALCIUM CHLORIDE 600; 310; 30; 20 MG/100ML; MG/100ML; MG/100ML; MG/100ML
125 INJECTION, SOLUTION INTRAVENOUS CONTINUOUS
Status: DISCONTINUED | OUTPATIENT
Start: 2021-06-16 | End: 2021-06-17 | Stop reason: HOSPADM

## 2021-06-16 RX ORDER — EPHEDRINE SULFATE 50 MG/ML
INJECTION INTRAVENOUS AS NEEDED
Status: DISCONTINUED | OUTPATIENT
Start: 2021-06-16 | End: 2021-06-16

## 2021-06-16 RX ADMIN — BUPIVACAINE HYDROCHLORIDE 15 ML: 5 INJECTION, SOLUTION PERINEURAL at 09:20

## 2021-06-16 RX ADMIN — BUPIVACAINE HYDROCHLORIDE 15 ML: 5 INJECTION, SOLUTION PERINEURAL at 09:30

## 2021-06-16 RX ADMIN — PROPOFOL 40 MCG/KG/MIN: 10 INJECTION, EMULSION INTRAVENOUS at 10:06

## 2021-06-16 RX ADMIN — SODIUM CHLORIDE, SODIUM LACTATE, POTASSIUM CHLORIDE, AND CALCIUM CHLORIDE 125 ML/HR: .6; .31; .03; .02 INJECTION, SOLUTION INTRAVENOUS at 21:18

## 2021-06-16 RX ADMIN — BUPIVACAINE HYDROCHLORIDE IN DEXTROSE 1.6 ML: 7.5 INJECTION, SOLUTION SUBARACHNOID at 10:01

## 2021-06-16 RX ADMIN — BUPIVACAINE 20 ML: 13.3 INJECTION, SUSPENSION, LIPOSOMAL INFILTRATION at 09:30

## 2021-06-16 RX ADMIN — TRANEXAMIC ACID 1000 MG: 1 INJECTION, SOLUTION INTRAVENOUS at 10:12

## 2021-06-16 RX ADMIN — SODIUM CHLORIDE, SODIUM LACTATE, POTASSIUM CHLORIDE, AND CALCIUM CHLORIDE 125 ML/HR: .6; .31; .03; .02 INJECTION, SOLUTION INTRAVENOUS at 08:41

## 2021-06-16 RX ADMIN — DOCUSATE SODIUM 100 MG: 100 CAPSULE ORAL at 17:54

## 2021-06-16 RX ADMIN — MIDAZOLAM 1 MG: 1 INJECTION INTRAMUSCULAR; INTRAVENOUS at 09:23

## 2021-06-16 RX ADMIN — SODIUM CHLORIDE, SODIUM LACTATE, POTASSIUM CHLORIDE, AND CALCIUM CHLORIDE: .6; .31; .03; .02 INJECTION, SOLUTION INTRAVENOUS at 09:23

## 2021-06-16 RX ADMIN — SODIUM CHLORIDE, SODIUM LACTATE, POTASSIUM CHLORIDE, AND CALCIUM CHLORIDE 125 ML/HR: .6; .31; .03; .02 INJECTION, SOLUTION INTRAVENOUS at 12:51

## 2021-06-16 RX ADMIN — CEFAZOLIN SODIUM 2000 MG: 2 SOLUTION INTRAVENOUS at 17:56

## 2021-06-16 RX ADMIN — LIDOCAINE HYDROCHLORIDE 5 ML: 20 INJECTION, SOLUTION INFILTRATION; PERINEURAL at 09:20

## 2021-06-16 RX ADMIN — EPHEDRINE SULFATE 5 MG: 50 INJECTION, SOLUTION INTRAVENOUS at 10:58

## 2021-06-16 RX ADMIN — LIDOCAINE HYDROCHLORIDE 5 ML: 20 INJECTION, SOLUTION INFILTRATION; PERINEURAL at 09:30

## 2021-06-16 RX ADMIN — OXYCODONE HYDROCHLORIDE 10 MG: 10 TABLET ORAL at 15:19

## 2021-06-16 RX ADMIN — CHLORHEXIDINE GLUCONATE 15 ML: 1.2 SOLUTION ORAL at 08:12

## 2021-06-16 RX ADMIN — ENOXAPARIN SODIUM 40 MG: 40 INJECTION SUBCUTANEOUS at 23:10

## 2021-06-16 RX ADMIN — GABAPENTIN 300 MG: 300 CAPSULE ORAL at 08:26

## 2021-06-16 RX ADMIN — OXYCODONE HYDROCHLORIDE 10 MG: 10 TABLET ORAL at 21:18

## 2021-06-16 RX ADMIN — ACETAMINOPHEN 975 MG: 325 TABLET, FILM COATED ORAL at 08:25

## 2021-06-16 RX ADMIN — EPHEDRINE SULFATE 10 MG: 50 INJECTION, SOLUTION INTRAVENOUS at 10:14

## 2021-06-16 RX ADMIN — CEFAZOLIN 3000 MG: 1 INJECTION, POWDER, FOR SOLUTION INTRAMUSCULAR; INTRAVENOUS at 10:10

## 2021-06-16 RX ADMIN — ONDANSETRON 4 MG: 2 INJECTION INTRAMUSCULAR; INTRAVENOUS at 11:04

## 2021-06-16 RX ADMIN — FENTANYL CITRATE 50 MCG: 50 INJECTION INTRAMUSCULAR; INTRAVENOUS at 09:23

## 2021-06-16 RX ADMIN — ALBUMIN (HUMAN) 25 G: 12.5 INJECTION, SOLUTION INTRAVENOUS at 12:20

## 2021-06-16 RX ADMIN — ACETAMINOPHEN 975 MG: 325 TABLET, FILM COATED ORAL at 21:18

## 2021-06-16 NOTE — INTERVAL H&P NOTE
H&P reviewed  After examining the patient I find no changes in the patients condition since the H&P had been written  Vitals:    06/16/21 0700   BP: 109/64   Pulse: 81   Temp: 99 3 °F (37 4 °C)   SpO2: 96%   Head:  Present  CVS:  RRR  Lungs:  Clear bilateral  Abdomen:  Present  Right foot with erythema and soft tissue swelling  Assessment:  Adult male osteoarthritis right knee who also has concomitant acute gouty arthropathy in the right 1st MTP  He has been through a comprehensive nonsurgical management plan yet continues to have pain and dysfunction in the right knee  Plan:   To OR for right total knee arthroplasty, he will receive medications also to treat his acute gouty arthropathy of the right 1st MTP

## 2021-06-16 NOTE — CONSULTS
Office Visit  4/30/2021  [address] [facility] [place]     Marleni Gregorio MD  Orthopedic Surgery  Primary osteoarthritis of right knee +1 more  Dx  Right Knee - Follow-up; Referred by Savannah Ayers DO  Reason for Visit   Progress Notes  Marleni Gregorio MD (Physician) [facility]  Expand AllCollapse All    Assessment:  1  Primary osteoarthritis of right knee      2  Chronic pain of right knee            Plan:  The patient will be scheduled for right total knee arthroplasty  We feel the patient will find significant relief per current symptoms, findings on imaging and exam   Risks, benefits, precautions and expectations were discussed  Risks include blood loss, potential infection and blood clots  Preoperative vitamins were prescribed  The patient should follow up after surgery           To do next visit:  Return for post-op visit       The above stated was discussed in layman's terms and the patient expressed understanding  All questions were answered to the patient's satisfaction               Scribe Attestation    I,:  Mehrdad Crawley am acting as a scribe while in the presence of the attending physician :       I,:  Marleni Gregorio MD personally performed the services described in this documentation    as scribed in my presence  :                 Subjective:   Ana Huang is a 82 y o  male who presents for follow up of right knee  He is s/p right knee Euflexxa injections with some benefit  Today he complains of occasional right generalized knee pain  Prolonged weight bearing and repetitive bending aggravates while rest alleviates  He has ahd steroid injections with limited benefit               Review of systems negative unless otherwise specified in HPI     Medical History        Past Medical History:   Diagnosis Date    Hypertension      Sleep apnea       no CPAP             Surgical History         Past Surgical History:   Procedure Laterality Date    CARPAL TUNNEL RELEASE        RI ARTHROCENTESIS ASPIR&/INJ MAJOR JT/BURSA W/O US   6/14/2018     Procedure: ASPIRATION RIGHT KNEE BURSA;   Surgeon: Amie Pang MD;  [facility];  Service: Orthopedics    RI TOTAL KNEE ARTHROPLASTY Left 6/14/2018     Procedure: ARTHROPLASTY KNEE TOTAL;  Surgeon: Amie Pang MD;  [facility];  Service: Orthopedics                  Family History   Problem Relation Age of Onset    No Known Problems Mother      No Known Problems Father           Social History           Occupational History    Not on file   Tobacco Use    Smoking status: Former Smoker    Smokeless tobacco: Never Used    Tobacco comment: quit > 40 years ago    Substance and Sexual Activity    Alcohol use: No    Drug use: No    Sexual activity: Not on file            Current Outpatient Medications:     aspirin 81 mg chewable tablet, Chew, Disp: , Rfl:     lisinopril-hydrochlorothiazide (PRINZIDE,ZESTORETIC) 20-25 MG per tablet, Take 1 tablet by mouth daily, Disp: , Rfl:     LUMIGAN 0 01 % ophthalmic drops, , Disp: , Rfl:      No Known Allergies               Vitals:     04/30/21 1503   BP: 146/87   Pulse: 79         Objective:  Physical exam  · General: Awake, Alert, Oriented  · Eyes: Pupils equal, round and reactive to light  · Heart: regular rate and rhythm  · Lungs: No audible wheezing  · Abdomen: soft                     Ortho Exam  Right knee:  Varus alignment  No erythema or ecchymosis  No effusion or swelling  Normal strength  Good ROM with crepitus   Calf compartments soft and supple  Sensation intact  Toes are warm sensate and mobile           Diagnostics, reviewed and taken today if performed as documented:     None performed      Procedures, if performed today:     Procedures     None performed       Portions of the record may have been created with voice recognition software   Occasional wrong word or "sound a like" substitutions may have occurred due to the inherent limitations of voice recognition software   Read the chart carefully and recognize, using context, where substitutions have occurred          Instructions       Return for post-op visit   After Visit Summary (Automatic SnapShot taken 5/21/2021)  Additional Documentation    Vitals:  /87 (BP Location: Left arm, Patient Position: Sitting, Cuff Size: Standard)   Pulse 79   Ht 6' (1 829 m)   Wt 132 kg (291 lb 3 2 oz)   BMI 39 49 kg/m²   BSA 2 5 m²      More Vitals   Flowsheets:  Covid Symptom Screening,   Anthropometrics      SmartForms:   [facility] WRAP UP REQUIREMENTS ADVANCED     [facility]      Encounter Info:  Billing Info,   History,   Allergies,   Detailed Report      Orders Placed       Labs     CBC and differential     Comprehensive metabolic panel     APTT     C-reactive protein     Protime-INR     Hemoglobin A1C W/EAG Estimation     PAT Covid Screening     Type and screen     Batsheva Skipper  (6 more)     Other Orders     Ambulatory referral to Family Practice Pending Review     Ambulatory referral to Physical Therapy Authorized     Case request operating room: ARTHROPLASTY KNEE TOTAL Once        All Encounter Results   Medication Changes       Ascorbic Acid 500 mg Oral Daily, Start 30 days prior to surgery     Enoxaparin Sodium 40 mg Subcutaneous Daily     Ferrous Sulfate 324 mg Oral 2 times daily before meals, Start 30 days prior to surgery     Folic Acid 1 mg Oral Daily, Start 30 days prior to surgery     Medication List   Visit Diagnoses       Primary osteoarthritis of right knee     Chronic pain of right knee     Problem List

## 2021-06-16 NOTE — H&P (VIEW-ONLY)
Office Visit  4/30/2021  [address] [facility] [place]     Justina Burris MD  Orthopedic Surgery  Primary osteoarthritis of right knee +1 more  Dx  Right Knee - Follow-up; Referred by Zhane Malcolm DO  Reason for Visit   Progress Notes  Justina Burris MD (Physician) [facility]  Expand AllCollapse All    Assessment:  1  Primary osteoarthritis of right knee      2  Chronic pain of right knee            Plan:  The patient will be scheduled for right total knee arthroplasty  We feel the patient will find significant relief per current symptoms, findings on imaging and exam   Risks, benefits, precautions and expectations were discussed  Risks include blood loss, potential infection and blood clots  Preoperative vitamins were prescribed  The patient should follow up after surgery           To do next visit:  Return for post-op visit       The above stated was discussed in layman's terms and the patient expressed understanding  All questions were answered to the patient's satisfaction               Scribe Attestation    I,:  Victor Hugo Sutherland am acting as a scribe while in the presence of the attending physician :       I,:  Justina Burris MD personally performed the services described in this documentation    as scribed in my presence  :                 Subjective:   Fabby Starr is a 82 y o  male who presents for follow up of right knee  He is s/p right knee Euflexxa injections with some benefit  Today he complains of occasional right generalized knee pain  Prolonged weight bearing and repetitive bending aggravates while rest alleviates  He has ahd steroid injections with limited benefit               Review of systems negative unless otherwise specified in HPI     Medical History        Past Medical History:   Diagnosis Date    Hypertension      Sleep apnea       no CPAP             Surgical History         Past Surgical History:   Procedure Laterality Date    CARPAL TUNNEL RELEASE        CT ARTHROCENTESIS ASPIR&/INJ MAJOR JT/BURSA W/O US   6/14/2018     Procedure: ASPIRATION RIGHT KNEE BURSA;   Surgeon: Brigette Estes MD;  [facility];  Service: Orthopedics    CT TOTAL KNEE ARTHROPLASTY Left 6/14/2018     Procedure: ARTHROPLASTY KNEE TOTAL;  Surgeon: Brigette Estes MD;  [facility];  Service: Orthopedics                  Family History   Problem Relation Age of Onset    No Known Problems Mother      No Known Problems Father           Social History           Occupational History    Not on file   Tobacco Use    Smoking status: Former Smoker    Smokeless tobacco: Never Used    Tobacco comment: quit > 40 years ago    Substance and Sexual Activity    Alcohol use: No    Drug use: No    Sexual activity: Not on file            Current Outpatient Medications:     aspirin 81 mg chewable tablet, Chew, Disp: , Rfl:     lisinopril-hydrochlorothiazide (PRINZIDE,ZESTORETIC) 20-25 MG per tablet, Take 1 tablet by mouth daily, Disp: , Rfl:     LUMIGAN 0 01 % ophthalmic drops, , Disp: , Rfl:      No Known Allergies               Vitals:     04/30/21 1503   BP: 146/87   Pulse: 79         Objective:  Physical exam  · General: Awake, Alert, Oriented  · Eyes: Pupils equal, round and reactive to light  · Heart: regular rate and rhythm  · Lungs: No audible wheezing  · Abdomen: soft                     Ortho Exam  Right knee:  Varus alignment  No erythema or ecchymosis  No effusion or swelling  Normal strength  Good ROM with crepitus   Calf compartments soft and supple  Sensation intact  Toes are warm sensate and mobile           Diagnostics, reviewed and taken today if performed as documented:     None performed      Procedures, if performed today:     Procedures     None performed       Portions of the record may have been created with voice recognition software   Occasional wrong word or "sound a like" substitutions may have occurred due to the inherent limitations of voice recognition software   Read the chart carefully and recognize, using context, where substitutions have occurred          Instructions       Return for post-op visit   After Visit Summary (Automatic SnapShot taken 5/21/2021)  Additional Documentation    Vitals:  /87 (BP Location: Left arm, Patient Position: Sitting, Cuff Size: Standard)   Pulse 79   Ht 6' (1 829 m)   Wt 132 kg (291 lb 3 2 oz)   BMI 39 49 kg/m²   BSA 2 5 m²      More Vitals   Flowsheets:  Covid Symptom Screening,   Anthropometrics      SmartForms:   [facility] WRAP UP REQUIREMENTS ADVANCED     [facility]      Encounter Info:  Billing Info,   History,   Allergies,   Detailed Report      Orders Placed       Labs     CBC and differential     Comprehensive metabolic panel     APTT     C-reactive protein     Protime-INR     Hemoglobin A1C W/EAG Estimation     PAT Covid Screening     Type and screen     William Margarito  (6 more)     Other Orders     Ambulatory referral to Family Practice Pending Review     Ambulatory referral to Physical Therapy Authorized     Case request operating room: ARTHROPLASTY KNEE TOTAL Once        All Encounter Results   Medication Changes       Ascorbic Acid 500 mg Oral Daily, Start 30 days prior to surgery     Enoxaparin Sodium 40 mg Subcutaneous Daily     Ferrous Sulfate 324 mg Oral 2 times daily before meals, Start 30 days prior to surgery     Folic Acid 1 mg Oral Daily, Start 30 days prior to surgery     Medication List   Visit Diagnoses       Primary osteoarthritis of right knee     Chronic pain of right knee     Problem List

## 2021-06-16 NOTE — OP NOTE
OPERATIVE REPORT  PATIENT NAME: Octavio Shaw    :  1935  MRN: [id number]  Pt Location: [facility] OR ROOM 04    SURGERY DATE: 2021    Surgeon(s) and Role:     * Sunil Steve MD - Primary     * Justin Luciano PA-C - Assisting     * Adali Lin MD - Assisting    Preop Diagnosis:  Primary osteoarthritis of right knee [M17 11]  Chronic pain of right knee [M25 561, G89 29]    Post-Op Diagnosis Codes:     * Primary osteoarthritis of right knee [M17 11]     * Chronic pain of right knee [M25 561, G89 29]    Procedure(s) (LRB):  ARTHROPLASTY KNEE TOTAL (Right)    Specimen(s):  * No specimens in log *    Estimated Blood Loss:   Minimal    Drains:  Closed/Suction Drain Anterior;Right Knee Accordion 10 Fr  (Active)   Number of days: 0       Urethral Catheter Straight-tip;Latex 16 Fr  (Active)   Number of days: 0       Anesthesia Type:   Choice    Operative Indications:  Primary osteoarthritis of right knee [M17 11]  Chronic pain of right knee [M25 561, G89 29]      Operative Findings:  depuy atttune   Femur-8   Poly-7   Tibia-8   [id number]    Complications:   None    Procedure and Technique: Following induction of adequate level of spinal anesthesia, Allen catheters and sterilely introduced this patient's bladder  Antibiotics administered  The right thigh was then fitted with a thigh-high tourniquet  The right lower extremity then underwent sterile prep and drape  Right lower extremity was exsanguinated gravity and the tourniquet inflated to 300 mmHg  A midline knee incision was created the knee in flexion  Full-thickness flaps raised in order to access the extensor mechanism  A medial arthrotomy was created open up the knee joint  Bony soft tissue releases were performed where necessary  The distal femoral cut was made 6° valgus  This is made of a long intramedullary nathalie  The proximal tibia cut was made next  As this was a varus knee, 2 mm reference medially    Care was taken in order to protect the integrity medial collateral, lateral collateral, posterior structures during these maneuvers  The distal femoral sizing guide was used, and the appropriate 4 in 1 cutting blocks impacted  The anterior, posterior, chamfer cuts then made  The box cut was made for the posterior stabilized unit  With the trial components in position, the knee was taken through range of motion, and found to be capable full extension, good flexio, and no mid flexion valgus instability  The patella was then resurfaced will utilize the manufacture's equipment, was found to be a size 41 mm button  The trial components removed and the knee was prepared for insertion of cemented components  The cemented tibia, cemented femur, trial poly, and cemented patella placed  Excess cement was removed, the knee was brought into extension  The cement was allowed to cure  The trial poly was taken out, and the knee was packed off  The tourniquet deflated, hemostasis was secured  The insert polyethylene was then snapped into position  The knee was taken through a final range of motion, found to be capable full extension, good flexion, no mid flexion valgus instability  There was excellent patellar tracking  Satisfied with the extent of surgery, the wounds then flushed with saline closed  A Betadine soak was initiated  A drain was placed deep brought out via separate lateral stab incision  The arthrotomy was closed number Vicryl suture  The subcu tissue closed 2 Vicryl suture  The skin was closed staples  Sterile dressings were applied    He was then transferred to recovery room in stable condition with plans to include physical therapy weight-bearing to tolerance, he will require DVT prophylaxis with Lovenox   I was present for the entire procedure    Patient Disposition:  PACU     SIGNATURE: Sandi Bhat MD  DATE: June 16, 2021  TIME: 11:25 AM

## 2021-06-16 NOTE — PLAN OF CARE
Problem: PHYSICAL THERAPY ADULT  Goal: Performs mobility at highest level of function for planned discharge setting  See evaluation for individualized goals  Description: Treatment/Interventions: Functional transfer training, LE strengthening/ROM, Elevations, Therapeutic exercise, Endurance training, Bed mobility, Gait training, Spoke to nursing  Equipment Recommended: Angel Chan, Other (Comment) [facility])       See flowsheet documentation for full assessment, interventions and recommendations  6/16/2021 1657 by Terri Stacy, PT  Note: Prognosis: Good  Problem List: Decreased strength, Decreased range of motion, Decreased endurance, Impaired balance, Decreased mobility, Obesity  Assessment: Pt is 82 y o  male admitted with Dx of primary osteoarthritis of right knee and chronic pain of right knee and underwent ARTHROPLASTY HIP/KNEE TOTAL (Right) on 6/16/2021  Pt 's comorbidities affecting POC include: Hypertension, CKD and Sleep apnea and personal factors of: advanced age and steps in the house  Pt's clinical presentation is currently  unstable/unpredictable which is evident in ongoing telem monitoring while in APU, postop guarding and inability to progress further w/ mobilization at this time due to fatigue and decreased standing balance  Pt presents w/ min overall weakness, decreased (R) LE AROM, decreased (R) > (L) LE strength, decreased functional endurance, impaired balance w/ associated gait deviations (a few steps at bedside; rw was introduced) and fall risk  Will cont to follow pt in PT for progressive mobilization to address above functional deficits and to max level of (I), endurance, and safety   Otherwise, anticipate pt will return home w/ available family support upon D/C provided he cont improving w/ mobility skills, safety, and endurance (incl on the steps) and when medically cleared; OP PT vs home PT follow up is recommended upon D/C based on progress and level of (A) available at home; will follow  Barriers to Discharge: Other (Comment) (steps in the house)        PT Discharge Recommendation: Home with home health rehabilitation (home PT vs OP PT depending on progress)          See flowsheet documentation for full assessment

## 2021-06-16 NOTE — ANESTHESIA PREPROCEDURE EVALUATION
Procedure:  ARTHROPLASTY KNEE TOTAL (Right Knee)    Relevant Problems   CARDIO   (+) Essential hypertension      /RENAL   (+) Stage 3a chronic kidney disease (HCC)      MUSCULOSKELETAL   (+) Prepatellar bursitis of right knee   (+) Primary osteoarthritis of left knee   (+) Primary osteoarthritis of right knee        Physical Exam    Airway    Mallampati score: III  TM Distance: >3 FB  Neck ROM: full     Dental   No notable dental hx     Cardiovascular      Pulmonary      Other Findings        Anesthesia Plan  ASA Score- 3     Anesthesia Type- spinal, regional and IV sedation with anesthesia with ASA Monitors  Additional Monitors:   Airway Plan:           Plan Factors-Exercise tolerance (METS): >4 METS  Chart reviewed  EKG reviewed  Existing labs reviewed  Patient summary reviewed  Patient is not a current smoker  There is medical exclusion for perioperative obstructive sleep apnea risk education  Induction- intravenous  Postoperative Plan-     Informed Consent- Anesthetic plan and risks discussed with patient  I personally reviewed this patient with the CRNA  Discussed and agreed on the Anesthesia Plan with the CRNA  Sheldon Buckley

## 2021-06-16 NOTE — PHYSICAL THERAPY NOTE
Physical Therapy Evaluation     Patient's Name: Norma Hoang    Admitting Diagnosis  Primary osteoarthritis of right knee [M17 11]  Chronic pain of right knee [M25 561, G37 29]    Problem List  Patient Active Problem List   Diagnosis    Primary osteoarthritis of left knee    Chronic pain of left knee    Right knee pain    Prepatellar bursitis of right knee    Aftercare following left knee joint replacement surgery    Effusion of right knee    Primary osteoarthritis of right knee    Essential hypertension    Stage 3a chronic kidney disease ([address] )    Class 3 severe obesity due to excess calories without serious comorbidity with body mass index (BMI) of 40 0 to 44 9 in adult [facility])       Past Medical History  Past Medical History:   Diagnosis Date    Hypertension     Sleep apnea     no CPAP        Past Surgical History  Past Surgical History:   Procedure Laterality Date    CARPAL TUNNEL RELEASE      COLONOSCOPY      DC ARTHROCENTESIS ASPIR&/INJ MAJOR JT/BURSA W/O US  6/14/2018    Procedure: ASPIRATION RIGHT KNEE BURSA; Surgeon: Rickey Moura MD;  Location: [facility];  Service: Orthopedics    DC TOTAL KNEE ARTHROPLASTY Left 6/14/2018    Procedure: ARTHROPLASTY KNEE TOTAL;  Surgeon: Rickey Moura MD;  Location: [facility];  Service: Orthopedics          06/16/21 1548   PT Last Visit   PT Visit Date 06/16/21   Note Type   Note type Evaluation   Pain Assessment   Pain Assessment Tool Pain Assessment not indicated - pt denies pain   Pain Score No Pain   Home Living   Type of Home House   Home Layout Multi-level; Able to live on main level with bedroom/bathroom  (1st floor set-up w/ no JUDITH and 1+ 1 steps inside)   Home Equipment Cane   Prior Function   Level of [place] Independent with ADLs and functional mobility  (amb w/ SPC recently)   Lives With Spouse  (able to (A))   Restrictions/Precautions   RLE Weight Bearing Per Order WBAT   Braces or Orthoses   (denies)   Other Precautions Multiple lines;Telemetry; Fall Risk  (hemovac in place and intact)   General   Additional Pertinent History Cleared for assessment in APU (spoke to RN)   Cognition   Overall Cognitive Status WFL   Arousal/Participation Alert   Orientation Level Oriented to person;Oriented to place;Oriented to situation   Memory Within functional limits   Following Commands Follows one step commands without difficulty   Comments Alert; in bed; responds to questions appropriately; very pleasant; cooperative; agreeable to try mobilization   RUE Assessment   RUE Assessment WFL  (AROM)   LUE Assessment   LUE Assessment WFL  (AROM)   RLE Assessment   RLE Assessment X  (decreased AROM hip and knee)   Strength RLE   RLE Overall Strength   (poor hip; poor - knee; fair ankle (grossly))   LLE Assessment   LLE Assessment WFL  (AROM)   Strength LLE   LLE Overall Strength   (good - (grossly))   Light Touch   RLE Light Touch Grossly intact   Bed Mobility   Supine to Sit 3  Moderate assistance   Additional items Assist x 2;HOB elevated; Increased time required;Verbal cues;LE management   Sit to Supine 3  Moderate assistance   Additional items Assist x 2; Increased time required;Verbal cues;LE management   Transfers   Sit to Stand 3  Moderate assistance   Additional items Assist x 2; Increased time required;Verbal cues  (from elevated bed surface)   Stand to Sit 3  Moderate assistance   Additional items Assist x 2; Increased time required;Verbal cues   Additional Comments BP = 155/85 on edge of bed   Ambulation/Elevation   Gait pattern Decreased R stance; Forward Flexion; Wide RHETT;Shuffling; Short stride   Gait Assistance 3  Moderate assist   Additional items Assist x 2;Verbal cues; Tactile cues   Assistive Device Rolling walker  (may need consider a bariatric type of rw)   Distance 1 ft total distance at bedside (to reposition higher in bed --> a few shuffling steps towards the [facility])   Balance   Static Sitting Fair   Dynamic Sitting Fair -   Static Standing Poor Dynamic Standing Poor -   Ambulatory Poor -   Activity Tolerance   Activity Tolerance Patient limited by fatigue   Nurse Made Aware spoke to RN in APU pre and post session   Assessment   Prognosis Good   Problem List Decreased strength;Decreased range of motion;Decreased endurance; Impaired balance;Decreased mobility;Obesity   Assessment Pt is 82 y o  male admitted with Dx of primary osteoarthritis of right knee and chronic pain of right knee and underwent ARTHROPLASTY HIP/KNEE TOTAL (Right) on 6/16/2021  Pt 's comorbidities affecting POC include: Hypertension, CKD and Sleep apnea and personal factors of: advanced age and steps in the house  Pt's clinical presentation is currently  unstable/unpredictable which is evident in ongoing telem monitoring while in APU, postop guarding and inability to progress further w/ mobilization at this time due to fatigue and decreased standing balance  Pt presents w/ min overall weakness, decreased (R) LE AROM, decreased (R) > (L) LE strength, decreased functional endurance, impaired balance w/ associated gait deviations (a few steps at bedside; rw was introduced) and fall risk  Will cont to follow pt in PT for progressive mobilization to address above functional deficits and to max level of (I), endurance, and safety  Otherwise, anticipate pt will return home w/ available family support upon D/C provided he cont improving w/ mobility skills, safety, and endurance (incl on the steps) and when medically cleared; OP PT vs home PT follow up is recommended upon D/C based on progress and level of (A) available at home; will follow  Barriers to Discharge Other (Comment)  (steps in the house)   Goals   Patient Goals to go home   STG Expiration Date 06/26/21   Short Term Goal #1 7-10 days  Pt will amb 150 ft w/ rw, mod (I) in order to facilitate safe return to premorbid environment and to initiate return to community amb status   Pt will negotiate 1 +1 step/curb w/ rw, mod (I) in order to assure safe navigation in and out of the premorbid living environment  Pt will achieve (I) level w/ bed mob in order to facilitate safety with OOB and back to bed transitions in own living environment  Pt will perform transfers w/ mod (I) to assure (I) and safety w/ functional mobility/transitions w/ all aspects of mobility/locomotion  Pt will participate in LE therex and balance activities to max progression w/ mobility skills  PT Treatment Day 0   Plan   Treatment/Interventions Functional transfer training;LE strengthening/ROM; Elevations; Therapeutic exercise; Endurance training;Bed mobility;Gait training;Spoke to nursing   PT Frequency Twice a day   Recommendation   PT Discharge Recommendation Home with home health rehabilitation  (home PT vs OP PT depending on progress)   Equipment Recommended Walker; Other (Comment)  (BSC)   Walker Package Recommended Wheeled walker   AM-PAC Basic Mobility Inpatient   Turning in Bed Without Bedrails 2   Lying on Back to Sitting on Edge of Flat Bed 2   Moving Bed to Chair 2   Standing Up From Chair 2   Walk in Room 2   Climb 3-5 Stairs 1   Basic Mobility Inpatient Raw Score 11   Basic Mobility Standardized Score 30 25   Modified [place] Scale   Modified [place] Scale 4         Dnenys Baumann, PT

## 2021-06-16 NOTE — ANESTHESIA PROCEDURE NOTES
Peripheral Block    Patient location during procedure: holding area  Start time: 6/16/2021 9:15 AM  Reason for block: at surgeon's request and post-op pain management  Staffing  Performed: anesthesiologist   Anesthesiologist: Rojas Russ MD  Preanesthetic Checklist  Completed: patient identified, IV checked, site marked, risks and benefits discussed, surgical consent, monitors and equipment checked, pre-op evaluation and timeout performed  Peripheral Block  Patient position: left lateral decubitus  Prep: ChloraPrep  Patient monitoring: continuous pulse ox, frequent blood pressure checks and heart rate  Block type: ipack block  Laterality: right  Injection technique: single-shot  Procedures: ultrasound guided, Ultrasound guidance required for the procedure to increase accuracy and safety of medication placement and decrease risk of complications    Ultrasound permanent image savedbupivacaine (MARCAINE) 0 5 % perineural infiltration, 15 mL  lidocaine (XYLOCAINE) 2 % infiltration, 5 mL  Needle  Needle type: Stimuplex   Needle gauge: 22 G  Needle length: 15 cm  Needle localization: anatomical landmarks and ultrasound guidance  Needle insertion depth: 12 cm  Assessment  Injection assessment: incremental injection, local visualized surrounding nerve on ultrasound, negative aspiration for heme and no paresthesia on injection  Paresthesia pain: none  Heart rate change: no  Slow fractionated injection: yes  Post-procedure:  site cleaned  patient tolerated the procedure well with no immediate complications

## 2021-06-16 NOTE — CONSULTS
Consultation - Nephrology   Kip Sanabria 82 y o  male MRN: [id number]  Unit/Bed#: OR POOL Encounter: [id number]    ASSESSMENT/PLAN:   1  CKD III/Perioperative renal risk reduction protocol: baseline creatinine 1 2-1 3 and saw Dr Anton Harris in office 6/3 for preop clearance  Most recent creatinine 1 31    Avoid NSAIDs, IV dye or other nephrotoxins    Is having some post op hypotension and receiving fluids and albumin   Continue IVF per protocol    Hold home lisinopril-hctz for now    Maintain webber catheter per protocol and follow bladder scans after catheter removal    Check am BMP   2  S/p R  TKA   3  Hypertension: BP low post op and getting albumin and LR at 125cc/h  Continue to hold hctz and lisinopril   4  Obesity     HISTORY OF PRESENT ILLNESS:  Requesting Physician: David Severino MD  Reason for Consult: Perioperative TARAN prevention protocol     Kip Sanabria is a 82y o  year old male  with a history of right knee osteoarthritis who was admitted to [facility] for an elective right total knee arthroplasty  Nephrology was consulted as part of the perioperative TARAN prevention protocol  He marin have CKD III and was seen preoperatively in the office by Dr Anton Harris   Patient was seen and examined post operatively  He is currently feeling okay except for discomfort in his knee  He denies any current chest pain, shortness of breath, nausea, vomiting, or diarrhea  He has a Webber catheter in place with good urine output  His blood pressure is running low postoperatively but he is asymptomatic  PAST MEDICAL HISTORY:  Past Medical History:   Diagnosis Date    Hypertension     Sleep apnea     no CPAP        PAST SURGICAL HISTORY:  Past Surgical History:   Procedure Laterality Date    CARPAL TUNNEL RELEASE      COLONOSCOPY      [place] ARTHROCENTESIS ASPIR&/INJ MAJOR JT/BURSA W/O US  6/14/2018    Procedure: ASPIRATION RIGHT KNEE BURSA;   Surgeon: David Severino MD;  Location: [facility];  Service: Orthopedics    [place] TOTAL KNEE ARTHROPLASTY Left 6/14/2018    Procedure: ARTHROPLASTY KNEE TOTAL;  Surgeon: Rickey Moura MD;  Location: [facility];  Service: Orthopedics       ALLERGIES:  No Known Allergies    SOCIAL HISTORY:  Social History     Substance and Sexual Activity   Alcohol Use No     Social History     Substance and Sexual Activity   Drug Use No     Social History     Tobacco Use   Smoking Status Former Smoker   Smokeless Tobacco Never Used   Tobacco Comment    quit 50 years ago        FAMILY HISTORY:  Family History   Problem Relation Age of Onset    No Known Problems Mother     No Known Problems Father        MEDICATIONS:  Scheduled Meds:  Current Facility-Administered Medications   Medication Dose Route Frequency Provider Last Rate    fentaNYL  50 mcg Intravenous Q5 Min PRN Evelin Fan, CRNA      HYDROmorphone  0 5 mg Intravenous Q5 Min PRN Terell Nieves MD      lactated ringers  125 mL/hr Intravenous Continuous Rickey Moura  mL/hr (06/16/21 1251)    ondansetron  4 mg Intravenous Once PRN Evelin Fan, CRNA         PRN Meds:   fentaNYL    HYDROmorphone    ondansetron    Continuous Infusions:lactated ringers, 125 mL/hr, Last Rate: 125 mL/hr (06/16/21 1251)        REVIEW OF SYSTEMS:  A complete review of systems was done  Pertinent positives and negatives noted in the HPI but otherwise the review of systems is negative      PHYSICAL EXAM:  Current Weight: Weight - Scale: 128 kg (282 lb)  First Weight: Weight - Scale: 128 kg (282 lb)  Vitals:    06/16/21 1245   BP: 92/55   Pulse: 74   Resp: 17   Temp:    SpO2: 97%       Intake/Output Summary (Last 24 hours) at 6/16/2021 1304  Last data filed at 6/16/2021 1114  Gross per 24 hour   Intake 600 ml   Output 350 ml   Net 250 ml     General:  No acute distress  Skin:  No rash  Eyes:  Sclerae anicteric  ENT:  Moist mucous membranes  Neck:  Trachea midline with no JVD  Chest:  Clear to auscultation bilaterally  CVS:  Regular rate and rhythm  Abdomen:  Soft, nontender, obese  Extremities:  No edema  Neuro:  Awake and alert  Psych:  Appropriate affect      Lab Results:         Invalid input(s): ALBUMIN    Radiology Results:   No orders to display

## 2021-06-16 NOTE — DISCHARGE INSTRUCTIONS
Discharge Instructions - Orthopedics  Norma Hoang 82 y o  male MRN: [id number]  Unit/Bed#: PACU 14    Weight Bearing Status:                                           Weight Bearing as tolerated to the right lower extremity  DVT prophylaxis:  Complete course of Lovenox as directed    Pain:  Continue analgesics as directed    Showering Instructions:   Do not shower until follow-up appointment  Dressing Instructions:   Keep dressing clean, dry and intact until follow up appointment  Driving Instructions:  No driving until cleared by Orthopaedic Surgery  PT/OT:  Continue PT/OT on outpatient basis as directed    Appt Instructions:    If you do not have your appointment, please call the clinic at [phone]  Otherwise followup as scheduled below:

## 2021-06-16 NOTE — ANESTHESIA POSTPROCEDURE EVALUATION
Post-Op Assessment Note    CV Status:  Stable  Pain Score: 0    Pain management: adequate     Mental Status:  Alert and awake   Hydration Status:  Euvolemic   PONV Controlled:  Controlled   Airway Patency:  Patent      Post Op Vitals Reviewed: Yes      Staff: CRNA, Anesthesiologist         No complications documented      BP   86/63   Temp   98 4   Pulse  87   Resp   12   SpO2   97

## 2021-06-16 NOTE — CONSULTS
Internal Medicine  Consultation Note    Patient: Norma Hoang  Age/sex: 82 y o  male  Medical Record #: [id number]    Assessment/Plan    Status Post right Total KNEE ARTHROPLASTY   Continue post op pain control measures as prescribed   Follow bowel regimen to help decrease narcotic induced constipation    Follow post operative hemoglobin with serial CBC and treat accordingly   Monitor WBC and fever curve post op while encouraging use of incentive spirometer   DVT prophylaxis in place and reviewed  CKD   Level 3   Baseline 1 2-1 4   Avoid nephrotoxic medications   Avoid hypotension in the post operative setting   Monitor bmp    HTN   Hold lisinopril/hctz in the post operative setting to avoid hypotension/TARAN   OK to resume on dc   Add hydralazine as needed for SBP >160   Monitor trend          PRE-OP HGB LEVEL: 14 8    Subjective/ HPI: Norma Hoang was seen and examined  Hx of KNEE pain failed out patient conservative measures  Elected to undergo total KNEE arthroplasty We are asked to see patient for post op management of underlying medical co-morbidities as outlined above  Pt did well intra and post operatively with good hemodynamics  Pt currently comfortable and without any reported post op nausea  ROS:   A 10 point ROS was performed; negative except as noted above       Social History:    Substance Use History:   Social History     Substance and Sexual Activity   Alcohol Use No     Social History     Tobacco Use   Smoking Status Former Smoker   Smokeless Tobacco Never Used   Tobacco Comment    quit 50 years ago      Social History     Substance and Sexual Activity   Drug Use No       Family History:    non-contributory      Review of Scheduled Meds:  Current Facility-Administered Medications   Medication Dose Route Frequency Provider Last Rate    acetaminophen  650 mg Oral Q6H PRN See Rausch MD      [START ON 6/17/2021] aspirin  81 mg Oral Daily See Rausch MD  calcium carbonate  1,000 mg Oral Daily PRN Jonnathan Harris MD      cefazolin  2,000 mg Intravenous Vincenzo Corrigan MD 2,000 mg (21 1756)    docusate sodium  100 mg Oral BID Jonnathan Harris MD      enoxaparin  40 mg Subcutaneous Daily Jonnathan Harris MD      lactated ringers  1,000 mL Intravenous Once PRN Jonnathan Harris MD      And    lactated ringers  1,000 mL Intravenous Once PRN Jonnathan Harris MD      lactated ringers  125 mL/hr Intravenous Continuous Jonnathan Harris  mL/hr (21 1710)    morphine injection  2 mg Intravenous Q2H PRN Jonnathan Harris MD      ondansetron  4 mg Intravenous Q6H PRN Jonnathan Harris MD      oxyCODONE  10 mg Oral Q4H PRN Jonnathan Harris MD      oxyCODONE  5 mg Oral Q4H PRN Jonnathan Harris MD      [START ON 2021] senna  1 tablet Oral Daily Jonnathan Harris MD      sodium chloride  1,000 mL Intravenous Once PRN Jonnathan Harris MD      And    sodium chloride  1,000 mL Intravenous Once PRN Jonnathan Harris MD         Labs: Invalid input(s): LABGLOM, CMP               Results from last 7 days   Lab Units 21  1144   POC GLUCOSE mg/dl 100       No results found for: Brendan Low, WOUNDCULT, SPUTUMCULTUR    Input and Output Summary (last 24 hours):        Intake/Output Summary (Last 24 hours) at 2021 1911  Last data filed at 2021 1843  Gross per 24 hour   Intake 1680 ml   Output 1415 ml   Net 265 ml       Imaging:     No orders to display       *Labs /Radiology studiesLabs reviewed  *Medications reviewed and reconciled as needed  *Please refer to order section for additional ordered labs studies  *Case discussed with primary attending during morning huddle case rounds    Vitals:   Temp (24hrs), Av 7 °F (36 5 °C), Min:97 °F (36 1 °C), Max:99 3 °F (37 4 °C)    Temp:  [97 °F (36 1 °C)-99 3 °F (37 4 °C)] 97 9 °F (36 6 °C)  HR:  [] 104  Resp:  [12-27] 18  BP: ()/(47-77) 126/73  SpO2: [92 %-98 %] 94 %  Body mass index is 38 25 kg/m²  Physical Exam:   General Appearance: no distress, conversive  HEENT: PERRLA, conjuctiva normal; oropharynx clear; mucous membranes moist;   Neck:  Supple, no lymphadenopathy or thyromegaly  Lungs: CTA, normal respiratory effort, no retractions, expiratory effort normal  CV: regular rate and rhythm , PMI normal   ABD: soft non tender, no masses , no hepatic or splenomegaly  EXT: DP pulses intact, no lymphadenopathy, no edema ;  right KNEE dressing in place  : webber draining clear yellow urine  Skin: normal turgor, normal texture, no rash  Psych: affect normal, mood normal  Neuro: AAOx3          Invasive Devices     Peripheral Intravenous Line            Peripheral IV 06/16/21 Distal;Left;Ventral (anterior) Arm <1 day          Drain            Closed/Suction Drain Anterior;Right Knee Accordion 10 Fr  <1 day    Urethral Catheter Straight-tip;Latex 16 Fr  <1 day                   Code Status: Level 1 - Full Code  Current Length of Stay: 0 day(s)    Total floor / unit time spent today 45 minutes  Coordination of patient's care was performed in conjunction with primary service  Time invested included review of patient's labs, vitals, and management of their comorbidities with continued monitoring, examination of patient as well as answering patient questions, documenting her findings and creating progress note in electronic medical record,  ordering appropriate diagnostic testing  ** Please Note: Fluency Direct voice to text software may have been used in the creation of this document   Audio transcription errors may occur**

## 2021-06-16 NOTE — ANESTHESIA PROCEDURE NOTES
Peripheral Block    Patient location during procedure: holding area  Start time: 6/16/2021 9:30 AM  Reason for block: at surgeon's request and post-op pain management  Staffing  Performed: anesthesiologist   Anesthesiologist: Jareth Niño MD  Preanesthetic Checklist  Completed: patient identified, IV checked, site marked, risks and benefits discussed, surgical consent, monitors and equipment checked, pre-op evaluation and timeout performed  Peripheral Block  Patient position: supine  Prep: ChloraPrep  Patient monitoring: continuous pulse ox, frequent blood pressure checks and heart rate  Block type: adductor canal block  Laterality: right  Injection technique: single-shot  Procedures: ultrasound guided, Ultrasound guidance required for the procedure to increase accuracy and safety of medication placement and decrease risk of complications    Ultrasound permanent image savedbupivacaine (MARCAINE) 0 5 % perineural infiltration, 15 mL  lidocaine (XYLOCAINE) 2 % infiltration, 5 mL  Needle  Needle type: Stimuplex   Needle gauge: 22 G  Needle length: 10 cm  Needle localization: anatomical landmarks and ultrasound guidance  Needle insertion depth: 8 cm  Assessment  Injection assessment: incremental injection, local visualized surrounding nerve on ultrasound, negative aspiration for heme and no paresthesia on injection  Paresthesia pain: immediately resolved  Heart rate change: no  Slow fractionated injection: yes  Post-procedure:  site cleaned  patient tolerated the procedure well with no immediate complications

## 2021-06-17 VITALS
TEMPERATURE: 98.9 F | HEART RATE: 96 BPM | DIASTOLIC BLOOD PRESSURE: 76 MMHG | RESPIRATION RATE: 18 BRPM | BODY MASS INDEX: 38.52 KG/M2 | OXYGEN SATURATION: 89 % | WEIGHT: 284.4 LBS | HEIGHT: 72 IN | SYSTOLIC BLOOD PRESSURE: 134 MMHG

## 2021-06-17 PROBLEM — M17.11 PRIMARY OSTEOARTHRITIS OF RIGHT KNEE: Status: RESOLVED | Noted: 2019-09-10 | Resolved: 2021-06-17

## 2021-06-17 PROBLEM — Z96.651 S/P TOTAL KNEE ARTHROPLASTY, RIGHT: Status: ACTIVE | Noted: 2021-06-17

## 2021-06-17 LAB
ANION GAP SERPL CALCULATED.3IONS-SCNC: 6 MMOL/L (ref 4–13)
BUN SERPL-MCNC: 26 MG/DL (ref 5–25)
CALCIUM SERPL-MCNC: 9.1 MG/DL (ref 8.3–10.1)
CHLORIDE SERPL-SCNC: 104 MMOL/L (ref 100–108)
CO2 SERPL-SCNC: 25 MMOL/L (ref 21–32)
CREAT SERPL-MCNC: 1.36 MG/DL (ref 0.6–1.3)
ERYTHROCYTE [DISTWIDTH] IN BLOOD BY AUTOMATED COUNT: 12.9 % (ref 11.6–15.1)
GFR SERPL CREATININE-BSD FRML MDRD: 48 ML/MIN/1.73SQ M
GLUCOSE SERPL-MCNC: 133 MG/DL (ref 65–140)
GLUCOSE SERPL-MCNC: 150 MG/DL (ref 65–140)
HCT VFR BLD AUTO: 38.1 % (ref 36.5–49.3)
HGB BLD-MCNC: 12.8 G/DL (ref 12–17)
MCH RBC QN AUTO: 33.3 PG (ref 26.8–34.3)
MCHC RBC AUTO-ENTMCNC: 33.6 G/DL (ref 31.4–37.4)
MCV RBC AUTO: 99 FL (ref 82–98)
PLATELET # BLD AUTO: 205 THOUSANDS/UL (ref 149–390)
PMV BLD AUTO: 9.7 FL (ref 8.9–12.7)
POTASSIUM SERPL-SCNC: 4 MMOL/L (ref 3.5–5.3)
RBC # BLD AUTO: 3.84 MILLION/UL (ref 3.88–5.62)
SODIUM SERPL-SCNC: 135 MMOL/L (ref 136–145)
WBC # BLD AUTO: 7.63 THOUSAND/UL (ref 4.31–10.16)

## 2021-06-17 PROCEDURE — 97116 GAIT TRAINING THERAPY: CPT

## 2021-06-17 PROCEDURE — 80048 BASIC METABOLIC PNL TOTAL CA: CPT | Performed by: ORTHOPAEDIC SURGERY

## 2021-06-17 PROCEDURE — 99024 POSTOP FOLLOW-UP VISIT: CPT | Performed by: PHYSICIAN ASSISTANT

## 2021-06-17 PROCEDURE — 99232 SBSQ HOSP IP/OBS MODERATE 35: CPT | Performed by: INTERNAL MEDICINE

## 2021-06-17 PROCEDURE — 0SRC0J9 REPLACEMENT OF RIGHT KNEE JOINT WITH SYNTHETIC SUBSTITUTE, CEMENTED, OPEN APPROACH: ICD-10-PCS | Performed by: ORTHOPAEDIC SURGERY

## 2021-06-17 PROCEDURE — 82948 REAGENT STRIP/BLOOD GLUCOSE: CPT

## 2021-06-17 PROCEDURE — 97530 THERAPEUTIC ACTIVITIES: CPT

## 2021-06-17 PROCEDURE — 97166 OT EVAL MOD COMPLEX 45 MIN: CPT

## 2021-06-17 PROCEDURE — 85027 COMPLETE CBC AUTOMATED: CPT | Performed by: ORTHOPAEDIC SURGERY

## 2021-06-17 PROCEDURE — NC001 PR NO CHARGE: Performed by: ORTHOPAEDIC SURGERY

## 2021-06-17 RX ORDER — DOCUSATE SODIUM 100 MG/1
100 CAPSULE, LIQUID FILLED ORAL 2 TIMES DAILY
Qty: 10 CAPSULE | Refills: 0 | Status: SHIPPED | OUTPATIENT
Start: 2021-06-17

## 2021-06-17 RX ORDER — ACETAMINOPHEN 325 MG/1
975 TABLET ORAL EVERY 8 HOURS SCHEDULED
Qty: 90 TABLET | Refills: 0 | Status: SHIPPED | OUTPATIENT
Start: 2021-06-17 | End: 2021-06-27

## 2021-06-17 RX ADMIN — OXYCODONE HYDROCHLORIDE 10 MG: 10 TABLET ORAL at 07:23

## 2021-06-17 RX ADMIN — OXYCODONE HYDROCHLORIDE 10 MG: 10 TABLET ORAL at 11:58

## 2021-06-17 RX ADMIN — DOCUSATE SODIUM 100 MG: 100 CAPSULE ORAL at 08:42

## 2021-06-17 RX ADMIN — ACETAMINOPHEN 975 MG: 325 TABLET, FILM COATED ORAL at 13:54

## 2021-06-17 RX ADMIN — ASPIRIN 81 MG: 81 TABLET, CHEWABLE ORAL at 08:42

## 2021-06-17 RX ADMIN — CEFAZOLIN SODIUM 2000 MG: 2 SOLUTION INTRAVENOUS at 01:58

## 2021-06-17 RX ADMIN — SODIUM CHLORIDE, SODIUM LACTATE, POTASSIUM CHLORIDE, AND CALCIUM CHLORIDE 125 ML/HR: .6; .31; .03; .02 INJECTION, SOLUTION INTRAVENOUS at 05:40

## 2021-06-17 RX ADMIN — OXYCODONE HYDROCHLORIDE 10 MG: 10 TABLET ORAL at 16:24

## 2021-06-17 RX ADMIN — SENNOSIDES 8.6 MG: 8.6 TABLET, FILM COATED ORAL at 08:42

## 2021-06-17 RX ADMIN — ACETAMINOPHEN 975 MG: 325 TABLET, FILM COATED ORAL at 05:17

## 2021-06-17 NOTE — PHYSICAL THERAPY NOTE
Physical Therapy Progress Note     06/17/21 0929   PT Last Visit   PT Visit Date 06/17/21   Note Type   Note Type Treatment   Pain Assessment   Pain Assessment Tool FLACC   Pain Rating: FLACC (Rest) - Face 1   Pain Rating: FLACC (Rest) - Legs 0   Pain Rating: FLACC (Rest) - Activity 0   Pain Rating: FLACC (Rest) - Cry 1   Pain Rating: FLACC (Rest) - Consolability 0   Score: FLACC (Rest) 2   Pain Rating: FLACC (Activity) - Face 2   Pain Rating: FLACC (Activity) - Legs 1   Pain Rating: FLACC (Activity) - Activity 1   Pain Rating: FLACC (Activity) - Cry 1   Pain Rating: FLACC (Activity) - Consolability 0   Score: FLACC (Activity) 5   Restrictions/Precautions   RLE Weight Bearing Per Order WBAT   Other Precautions WBS; Multiple lines; Fall Risk;Pain  (hemovac)   Subjective   Subjective pt encountered seated in recliner, pleasant and agreable to treatment  Reports controlled pain & that it was easer to mobilize this AM   States he will stay in solarium at home with 1 JUDITH & 1 additional step to bathroom  States family lives next door & wife will be primary caregiver & transport  Transfers   Sit to Stand 5  Supervision   Additional items Assist x 1; Armrests; Increased time required   Stand to Sit 5  Supervision   Additional items Assist x 1; Armrests; Increased time required   Ambulation/Elevation   Gait pattern Excessively slow; Step to;Short stride; Foward flexed; Inconsistent angelika;Decreased R stance;Decreased foot clearance; Antalgic; Improper Weight shift   Gait Assistance 4  Minimal assist   Additional items Assist x 1   Assistive Device Rolling walker;Bariatric Rolling walker   Distance 80' x 2   Curbs x2 curb steps RW min A   Balance   Static Sitting Fair +   Static Standing Fair -   Ambulatory Poor +   Activity Tolerance   Activity Tolerance Patient tolerated treatment well;Patient limited by fatigue;Patient limited by pain   Nurse Made Aware LUCY Harris   Assessment   Prognosis Good   Problem List Decreased strength;Decreased range of motion;Decreased endurance; Impaired balance;Decreased mobility;Obesity   Assessment Pt demonstrated improved mobility this session, performing transfers with reduced assist & ambulating up to household distances prior to requiring seated rests  Pt progressed from min A to supervision during session, but benefitted from instructions for sequencing of tasks to ensure safety  pt initially hesitant to weight bear on RLE, but improved with instrucitons  & incresased distances  pt trialed steps as noted above,, verbalizing & demonstrating understanding of instructions, but required min A for balance while ascending  would benefit from additional therapy during hospital admit to ensure carryover of instructions for sequencing, safety & improved stair trial to ensure safe navigation of home & community upon discharge  POC and discharge plan remain appropriate to address deficits during acute recovery  Goals   Patient Goals to get better & go home   STG Expiration Date 06/26/21   PT Treatment Day 1   Plan   Treatment/Interventions Functional transfer training;LE strengthening/ROM; Elevations; Therapeutic exercise; Endurance training;Patient/family training;Equipment eval/education; Bed mobility;Gait training   Progress Progressing toward goals   PT Frequency Twice a day   Recommendation   PT Discharge Recommendation Home with outpatient rehabilitation   Equipment Recommended   (pt reports owning DME)   [phone]   Turning in Bed Without Bedrails 2   Lying on Back to Sitting on Edge of Flat Bed 2   Moving Bed to Chair 3   Standing Up From Chair 4   Walk in Room 3   Climb 3-5 Stairs 3   Basic Mobility Inpatient Raw Score 17   Basic Mobility Standardized Score 39 67   The patient's AM-PAC Basic Mobility Inpatient Short Form Raw Score is 17, Standardized Score is 39 67  A standardized score greater than 42 9 suggests the patient may benefit from discharge to home   Please also refer to the recommendation of the Physical Therapist for safe discharge planning            Dodie Garnett PTA

## 2021-06-17 NOTE — CASE MANAGEMENT
Pt is not a <30 day readmission or current bundle  Risk of unplanned readmission score is 8 (green)  Pt s/p right total knee replacement  CM met with pt at bedside to introduce CM role and begin discharge planning  Pt lives with his wife, Sandee Perez ([phone]) in a 2 story house that has 1 JUDITH and a first floor setup  Pt reports being independent with ADLs PTA, pt has a single point cane, rolling walker, and shower chair  Pt declines need for BSC at this time  Pt reports no history of VNA, STR, inpatient MH treatment or D/A treatment  Pt drives and is retired  PCP is Dr Hussain Nance ([phone]) and pt uses [address] in [place] for prescriptions  Pt's wife to transport at time of discharge  [facility] to [facility]  with wife to transport, Lovenox filled and at home  CM reviewed d/c planning process including the following: identifying help at home, patient preference for d/c planning needs, Discharge Lounge, [facility] Meds to Bed program, availability of treatment team to discuss questions or concerns patient and/or family may have regarding understanding medications and recognizing signs and symptoms once discharged  CM also encouraged patient to follow up with all recommended appointments after discharge  Patient advised of importance for patient and family to participate in managing patients medical well being

## 2021-06-17 NOTE — PLAN OF CARE
Problem: PHYSICAL THERAPY ADULT  Goal: Performs mobility at highest level of function for planned discharge setting  See evaluation for individualized goals  Description: Treatment/Interventions: Functional transfer training, LE strengthening/ROM, Elevations, Therapeutic exercise, Endurance training, Bed mobility, Gait training, Spoke to nursing  Equipment Recommended: Otis Bolanos, [place] (Comment) [facility])       See flowsheet documentation for full assessment, interventions and recommendations  Outcome: Progressing  Note: Prognosis: Good  Problem List: Decreased strength, Decreased range of motion, Decreased endurance, Impaired balance, Decreased mobility, Obesity  Assessment: Pt demonstrated improved mobility this session, performing transfers with reduced assist & ambulating up to household distances prior to requiring seated rests  Pt progressed from min A to supervision during session, but benefitted from instructions for sequencing of tasks to ensure safety  pt initially hesitant to weight bear on RLE, but improved with instrucitons  & incresased distances  pt trialed steps as noted above,, verbalizing & demonstrating understanding of instructions, but required min A for balance while ascending  would benefit from additional therapy during hospital admit to ensure carryover of instructions for sequencing, safety & improved stair trial to ensure safe navigation of home & community upon discharge  POC and discharge plan remain appropriate to address deficits during acute recovery  Barriers to Discharge: Other (Comment) (steps in the house)        PT Discharge Recommendation: Home with outpatient rehabilitation          See flowsheet documentation for full assessment

## 2021-06-17 NOTE — CASE MANAGEMENT
CM reviewed possibility of VNA of in-home therapy however pt declining stating that he would prefer OPPT  CM informed team of same

## 2021-06-17 NOTE — PLAN OF CARE
Problem: Prexisting or High Potential for Compromised Skin Integrity  Goal: Skin integrity is maintained or improved  Description: INTERVENTIONS:  - Identify patients at risk for skin breakdown  - Assess and monitor skin integrity  - Assess and monitor nutrition and hydration status  - Monitor labs   - Assess for incontinence   - Turn and reposition patient  - Assist with mobility/ambulation  - Relieve pressure over bony prominences  - Avoid friction and shearing  - Provide appropriate hygiene as needed including keeping skin clean and dry  - Evaluate need for skin moisturizer/barrier cream  - Collaborate with interdisciplinary team   - Patient/family teaching  - Consider wound care consult   6/17/2021 0121 by Kyle Koo RN  Outcome: Progressing  6/17/2021 0120 by Kyle Koo RN  Outcome: Progressing      Problem: GENITOURINARY - ADULT  Goal: Maintains or returns to baseline urinary function  Description: INTERVENTIONS:  - Assess urinary function  - Encourage oral fluids to ensure adequate hydration if ordered  - Administer IV fluids as ordered to ensure adequate hydration  - Administer ordered medications as needed  - Offer frequent toileting  - Follow urinary retention protocol if ordered  Outcome: Progressing  Goal: Absence of urinary retention  Description: INTERVENTIONS:  - Assess patients ability to void and empty bladder  - Monitor I/O  - Bladder scan as needed  - Discuss with physician/AP medications to alleviate retention as needed  - Discuss catheterization for long term situations as appropriate  Outcome: Progressing  Goal: Urinary catheter remains patent  Description: INTERVENTIONS:  - Assess patency of urinary catheter  - If patient has a chronic webber, consider changing catheter if non-functioning  - Follow guidelines for intermittent irrigation of non-functioning urinary catheter  Outcome: Progressing     Problem: SKIN/TISSUE INTEGRITY - ADULT  Goal: Skin Integrity remains intact(Skin Breakdown Prevention)  Description: Assess:  -Perform Sanchez assessment every shift  -Clean and moisturize skin every shift  -Inspect skin when repositioning, toileting, and assisting with ADLS  -Assess extremities for adequate circulation and sensation     Bed Management:  -Have minimal linens on bed & keep smooth, unwrinkled  -Change linens as needed when moist or perspiring  -Avoid sitting or lying in one position for more than 2 hours while in bed  -Keep HOB at 45 degrees     Toileting:  -Offer bedside commode  -Assess for incontinence every 2 hrs    Activity:  -Mobilize patient 3 times a day  -Encourage activity and walks on unit  -Encourage or provide ROM exercises   -Turn and reposition patient every 2 Hours  -Use appropriate equipment to lift or move patient in bed  -Instruct/ Assist with weight shifting every 2 when out of bed in chair  -Consider limitation of chair time 2 hour intervals    Skin Care:  -Avoid use of baby powder, tape, friction and shearing, hot water or constrictive clothing  -Do not massage red bony areas    Next Steps:  -Teach patient strategies to minimize risks such as turning q 2 hrs  -Consider consults to  interdisciplinary teams such as PT/ OT  Outcome: Progressing  Goal: Incision(s), wounds(s) or drain site(s) healing without S/S of infection  Description: INTERVENTIONS  - Assess and document dressing, incision, wound bed, drain sites and surrounding tissue  - Provide patient and family education  - Perform skin care/dressing changes every shift  Outcome: Progressing     Problem: MUSCULOSKELETAL - ADULT  Goal: Maintain or return mobility to safest level of function  Description: INTERVENTIONS:  - Assess patient's ability to carry out ADLs; assess patient's baseline for ADL function and identify physical deficits which impact ability to perform ADLs (bathing, care of mouth/teeth, toileting, grooming, dressing, etc )  - Assess/evaluate cause of self-care deficits   - Assess range of motion  - Assess patient's mobility  - Assess patient's need for assistive devices and provide as appropriate  - Encourage maximum independence but intervene and supervise when necessary  - Involve family in performance of ADLs  - Assess for home care needs following discharge   - Consider OT consult to assist with ADL evaluation and planning for discharge  - Provide patient education as appropriate  Outcome: Progressing  Goal: Maintain proper alignment of affected body part  Description: INTERVENTIONS:  - Support, maintain and protect limb and body alignment  - Provide patient/ family with appropriate education  Outcome: Progressing

## 2021-06-17 NOTE — PLAN OF CARE
Problem: PHYSICAL THERAPY ADULT  Goal: Performs mobility at highest level of function for planned discharge setting  See evaluation for individualized goals  Description: Treatment/Interventions: Functional transfer training, LE strengthening/ROM, Elevations, Therapeutic exercise, Endurance training, Bed mobility, Gait training, Spoke to nursing  Equipment Recommended: Johana Johnson, Other (Comment) [facility])       See flowsheet documentation for full assessment, interventions and recommendations  6/17/2021 1615 by Jasvir Cole PTA  Outcome: Progressing  Note: Prognosis: Good  Problem List: Decreased strength, Decreased range of motion, Decreased endurance, Impaired balance, Decreased mobility, Obesity  Assessment: PT demonstrated improved endurance this session, successfully ambulating repeated household distances with decreased dyspnea compared to AM session  [place] Spray did slow as pt fatigued, but no LOB noted during ambulation  Pt also able to negotiate repeated curb steps without complaints of excessive fatigue  REquired repetition due to initial dificulty with sequencing to ascend safely  No LOB noted despite requisite correctsion  Pt did demonstrate x2 LOB initially upon standing when attempting to use urinal chairside, but eventually was able to self manage underwear & urinal without incident  Pt instructed to have wife or family present with dynamic balance tasks that required UE use to ensure safety  Pt also encouraged to ambulate & maintain overall mobility at home, working to regain strength, balance & function on affected LE  Pt verbalized understanding to all instrucitons & repors he will be able to safely navigate home with family support  Pt has demonstrated sufficent progress to discharge home with OPPT to progress to PLOF  Barriers to Discharge:  Other (Comment) (steps in the house)        PT Discharge Recommendation: Home with outpatient rehabilitation     PT - OK to Discharge: Yes    See flowsheet documentation for full assessment  6/17/2021 6770 by Miguel Trejo PTA  Outcome: Progressing  Note: Prognosis: Good  Problem List: Decreased strength, Decreased range of motion, Decreased endurance, Impaired balance, Decreased mobility, Obesity  Assessment: Pt demonstrated improved mobility this session, performing transfers with reduced assist & ambulating up to household distances prior to requiring seated rests  Pt progressed from min A to supervision during session, but benefitted from instructions for sequencing of tasks to ensure safety  pt initially hesitant to weight bear on RLE, but improved with instrucitons  & incresased distances  pt trialed steps as noted above,, verbalizing & demonstrating understanding of instructions, but required min A for balance while ascending  would benefit from additional therapy during hospital admit to ensure carryover of instructions for sequencing, safety & improved stair trial to ensure safe navigation of home & community upon discharge  POC and discharge plan remain appropriate to address deficits during acute recovery  Barriers to Discharge: Other (Comment) (steps in the house)        PT Discharge Recommendation: Home with outpatient rehabilitation          See flowsheet documentation for full assessment

## 2021-06-17 NOTE — PROGRESS NOTES
Orthopedics Progress / Post-op Note  Valentino Asal 82 y o  male MRN: [id number]  Unit/Bed#: -01      Subjective:  82 y o male post operative day 1 s/p right total knee arthroplasty  Pain currently controlled in the right knee region  Np acute events overnight      Objective:    Labs:  0   Lab Value Date/Time    HCT 45 0 05/21/2021 0858    HCT 30 8 (L) 06/16/2018 0455    HCT 32 5 (L) 06/15/2018 0523    HGB 14 8 05/21/2021 0858    HGB 9 9 (L) 06/16/2018 0455    HGB 10 5 (L) 06/15/2018 0523    INR 0 99 05/21/2021 0858    WBC 4 36 05/21/2021 0858    WBC 9 73 06/16/2018 0455    WBC 8 36 06/15/2018 0523    CRP <3 0 05/21/2021 0858       Meds:    Current Facility-Administered Medications:     acetaminophen (TYLENOL) tablet 975 mg, 975 mg, Oral, Q8H GABBY, PARISH Lee, 975 mg at 06/17/21 0683    aspirin chewable tablet 81 mg, 81 mg, Oral, Daily, Juliana Aldridge MD    calcium carbonate (TUMS) chewable tablet 1,000 mg, 1,000 mg, Oral, Daily PRN, Juliana Aldridge MD    docusate sodium (COLACE) capsule 100 mg, 100 mg, Oral, BID, Juliana Aldridge MD, 100 mg at 06/16/21 1754    enoxaparin (LOVENOX) subcutaneous injection 40 mg, 40 mg, Subcutaneous, Daily, Juliana Aldridge MD, 40 mg at 06/16/21 2310    hydrALAZINE (APRESOLINE) tablet 25 mg, 25 mg, Oral, Q8H PRN, PARISH Watson    lactated ringers bolus 1,000 mL, 1,000 mL, Intravenous, Once PRN **AND** lactated ringers bolus 1,000 mL, 1,000 mL, Intravenous, Once PRN, Juliana Aldridge MD    lactated ringers infusion, 125 mL/hr, Intravenous, Continuous, Juliana Aldridge MD, Last Rate: 125 mL/hr at 06/17/21 0540, 125 mL/hr at 06/17/21 0540    morphine injection 2 mg, 2 mg, Intravenous, Q2H PRN, Juliana Aldridge MD    ondansetron [facility]) injection 4 mg, 4 mg, Intravenous, Q6H PRN, Juliana Aldridge MD    oxyCODONE (ROXICODONE) IR tablet 10 mg, 10 mg, Oral, Q4H PRN, Juliana Aldridge MD, 10 mg at 06/16/21 2118    oxyCODONE (ROXICODONE) IR tablet 5 mg, 5 mg, Oral, Q4H PRN, Tena Foley MD    [facility]) tablet 8 6 mg, 1 tablet, Oral, Daily, Tena Foley MD    sodium chloride 0 9 % bolus 1,000 mL, 1,000 mL, Intravenous, Once PRN **AND** sodium chloride 0 9 % bolus 1,000 mL, 1,000 mL, Intravenous, Once PRN, Tena Foley MD    Blood Culture:   No results found for: BLOODCX    Wound Culture:   No results found for: WOUNDCULT    Ins and Outs:  I/O last 24 hours: In: 2963 6 [P O :180; I V :2233 6; IV Piggyback:550]  Out: 1205 [Urine:2565; Drains:300]      Orthopedic Physical Exam:  Vitals:    06/17/21 0307   BP: 129/71   Pulse: 92   Resp: 17   Temp: 98 9 °F (37 2 °C)   SpO2: 93%   Sitting upright in chair, NAD  Breathing unlabored  right Lower Extremity extremity:  · ACE/Dressings C/D/I, no saturation  · HV drain x 1 holding suction, with bloody drainage in cannister  · 5/5 strength with ankle DF/PF, EHL/FHL  · Sensation intact  · Toes warm, sensate, mobile with good capillary refill  · Right 1st MTP joint mildly erythematous with mild TTP  · No calf tenderness or significant pitting edema    _*_*_*_*_*_*_*_*_*_*_*_*_*_*_*_*_*_*_*_*_*_*_*_*_*_*_*_*_*_*_*_*_*_*_*_*_*_*_*_*_*    Assessment: 85 y o male post operative day 1 s/p right total knee arthroplasty        Plan:  · WBAT RLE  · Up and out of bed   · DVT prophylaxis (Lovenox)  · Drain pull  · Analgesics  · PT/OT with assistance as needed   · Dispo: Ortho will follow  · Will continue to assess for acute blood loss anemia  Labs currently pending, will follow-up      Min Ortega PA-C

## 2021-06-17 NOTE — PROGRESS NOTES
NEPHROLOGY PROGRESS NOTE   Pb Gaspar 82 y o  male MRN: [id number]  Unit/Bed#: -01 Encounter: [id number]      ASSESSMENT & PLAN:  1  Chronic kidney disease stage 3 with baseline creatinine 1 2-1 3  Renal function is stable postoperatively  Avoid nephrotoxic including NSAIDs, keep holding lisinopril and HCTZ for today  Those can be resumed after discharge  Continue follow-up with Nephrology as outpatient    2  Right knee osteoarthritis status post right knee total arthroplasty  Postoperative management per orthopedics  Avoid nephrotoxins including NSAIDs  3  Hypertension, blood pressure is stable, keep holding lisinopril and HCTZ for today    Those can be resumed after discharge if blood pressure remains stable  Follow low-salt diet      SUBJECTIVE:  Patient seen and examined, some expected knee pain but tolerable, states was ambulating earlier, denies any chest pain, no shortness of breath no nausea, no vomiting    OBJECTIVE:  Current Weight: Weight - Scale: 129 kg (284 lb 6 4 oz)  Vitals:    06/17/21 1020   BP: 134/76   Pulse: 96   Resp:    Temp: 98 9 °F (37 2 °C)   SpO2: (!) 89%       Intake/Output Summary (Last 24 hours) at 6/17/2021 1049  Last data filed at 6/17/2021 7292  Gross per 24 hour   Intake 4138 59 ml   Output 2515 ml   Net 1623 59 ml       General: conscious, cooperative, in not acute distress  Eyes: conjunctivae pink, anicteric sclerae  ENT: lips and mucous membranes moist  Neck: supple, no JVD  Chest: clear breath sounds bilateral, no crackles, ronchus or wheezings  CVS: distinct S1 & S2, normal rate, regular rhythm  Abdomen:  Obese, non-tender, non-distended, normoactive bowel sounds  Extremities: no significant edema of both legs, right leg covered with dressing  Skin: no rash  Neuro: awake, alert, oriented        Medications:    Current Facility-Administered Medications:     acetaminophen (TYLENOL) tablet 975 mg, 975 mg, Oral, Q8H Cookie PIERRE CRNP, 975 mg at 06/17/21 3492   aspirin chewable tablet 81 mg, 81 mg, Oral, Daily, Marley Carreon MD, 81 mg at 06/17/21 4248    calcium carbonate (TUMS) chewable tablet 1,000 mg, 1,000 mg, Oral, Daily PRN, Marley Carreon MD    docusate sodium (COLACE) capsule 100 mg, 100 mg, Oral, BID, Marley Carreon MD, 100 mg at 06/17/21 0842    enoxaparin (LOVENOX) subcutaneous injection 40 mg, 40 mg, Subcutaneous, Daily, Marley Carreon MD, 40 mg at 06/16/21 2310    hydrALAZINE (APRESOLINE) tablet 25 mg, 25 mg, Oral, Q8H PRN, PARISH Fairchild    lactated ringers bolus 1,000 mL, 1,000 mL, Intravenous, Once PRN **AND** lactated ringers bolus 1,000 mL, 1,000 mL, Intravenous, Once PRN, Marley Carreon MD    lactated ringers infusion, 125 mL/hr, Intravenous, Continuous, Marley Carreon MD, Stopped at 06/17/21 0724    morphine injection 2 mg, 2 mg, Intravenous, Q2H PRN, Marley Carreon MD    [facility]ron [facility]) injection 4 mg, 4 mg, Intravenous, Q6H PRN, Marley Carreon MD    oxyCODONE (ROXICODONE) IR tablet 10 mg, 10 mg, Oral, Q4H PRN, Marley Carreon MD, 10 mg at 06/17/21 0723    oxyCODONE (ROXICODONE) IR tablet 5 mg, 5 mg, Oral, Q4H PRN, Marley Carreon MD    [facility]) tablet 8 6 mg, 1 tablet, Oral, Daily, Marley Carreon MD, 8 6 mg at 06/17/21 0842    sodium chloride 0 9 % bolus 1,000 mL, 1,000 mL, Intravenous, Once PRN **AND** sodium chloride 0 9 % bolus 1,000 mL, 1,000 mL, Intravenous, Once PRN, Marley Carreon MD    Invasive Devices:        Lab Results:   Results from last 7 days   Lab Units 06/17/21  0553   WBC Thousand/uL 7 63   HEMOGLOBIN g/dL 12 8   HEMATOCRIT % 38 1   PLATELETS Thousands/uL 205   SODIUM mmol/L 135*   POTASSIUM mmol/L 4 0   CHLORIDE mmol/L 104   CO2 mmol/L 25   BUN mg/dL 26*   CREATININE mg/dL 1 36*   CALCIUM mg/dL 9 1         Portions of the record may have been created with voice recognition software   Occasional wrong word or "sound a like" substitutions may have occurred due to the inherent limitations of voice recognition software  Read the chart carefully and recognize, using context, where substitutions have occurred  If you have any questions, please contact the dictating provider

## 2021-06-17 NOTE — DISCHARGE SUMMARY
ORTHOPEDICS DISCHARGE SUMMARY  Navin Carreon 82 y o  male MRN: [id number]  Unit/Bed#: -01    Attending Physician: Cam High    Admitting diagnosis: Primary osteoarthritis of right knee [M17 11]  Chronic pain of right knee [M25 561, G89 29]    Discharge diagnosis: Primary osteoarthritis of right knee [M17 11]  Chronic pain of right knee [M25 561, G89 29]    Date of admission: 6/16/2021    Date of discharge: 06/17/21         Procedure: Right total knee arthroplasty    HPI:  This is a 82y o  year old male that presented to the office with signs and symptoms of right knee osteoarthritis  They tried and failed conservative treatment measures and wished to proceed with surgical intervention  The risks, benefits, and complications of the procedure were discussed with the patient and informed consent was obtained  Hospital Course: The patient was admitted to the hospital on 6/16/2021 and underwent an uncomplicated right total knee arthroplasty  They were transferred to the floor after a brief stay in the post-anesthesia care unit  Their pain was well managed with IV and oral pain medications  They began therapy on post operative day #1  Lovenox was also started for DVT prophylaxis 12 hours post operatively  Hemovac drain was removed on POD1  On discharge date pt was cleared by PT and the medicine team and determined to be safe for discharge  Daily discussion was had with the patient, nursing staff, orthopaedic team, and family members if present  All questions were answered to the patients satisifaction  0   Lab Value Date/Time    HGB 12 8 06/17/2021 0553    HGB 14 8 05/21/2021 0858    HGB 9 9 (L) 06/16/2018 0455    HGB 10 5 (L) 06/15/2018 0523    HGB 15 4 05/17/2018 1347       No diagnosis of acute blood loss anemia  Vital signs remained stable and pt was resuscitated with IVF as needed   Body mass index is 38 57 kg/m²  moderately obese   Recommend behavior modifications, nutrition and physical activity  Discharge Instructions: The patient was discharged weight bearing as tolerated to the right lower extremity  Lovenox will be continued for 28 days  Continue PT/OT  Take pain medications as instructed  Discharge Medications: For the complete list of discharge medications, please refer to the patient's medication reconciliation

## 2021-06-17 NOTE — PHYSICAL THERAPY NOTE
Physical Therapy Progress Note     06/17/21 1351   PT Last Visit   PT Visit Date 06/17/21   Note Type   Note Type Treatment   Pain Assessment   Pain Assessment Tool FLACC   Pain Rating: FLACC (Rest) - Face 0   Pain Rating: FLACC (Rest) - Legs 0   Pain Rating: FLACC (Rest) - Activity 0   Pain Rating: FLACC (Rest) - Cry 1   Pain Rating: FLACC (Rest) - Consolability 0   Score: FLACC (Rest) 1   Pain Rating: FLACC (Activity) - Face 1   Pain Rating: FLACC (Activity) - Legs 1   Pain Rating: FLACC (Activity) - Activity 1   Pain Rating: FLACC (Activity) - Cry 1   Pain Rating: FLACC (Activity) - Consolability 0   Score: FLACC (Activity) 4   Restrictions/Precautions   RLE Weight Bearing Per Order WBAT   Other Precautions WBS;Pain; Fall Risk   Subjective   Subjective Pt encountered seated in reclienr, resting comfortably, reporting no new complaints  João Flow to go home  Transfers   Sit to Stand 5  Supervision   Additional items Assist x 1  (min A at beginning of session)   Stand to Sit 5  Supervision   Additional items Assist x 1   Ambulation/Elevation   Gait pattern Excessively slow; Short stride; Step to; Inconsistent angelika; Foward flexed;Decreased foot clearance;Decreased R stance; Antalgic; Improper Weight shift   Gait Assistance 5  Supervision   Additional items Assist x 1   Assistive Device Rolling walker   Distance 80' x 2   Curbs x4 curb steps RW & gelacio A-->superrvision   Balance   Static Sitting Fair +   Static Standing Fair -   Ambulatory Fair -   Activity Tolerance   Activity Tolerance Patient tolerated treatment well;Patient limited by fatigue;Patient limited by pain   Nurse Made Aware LUCY Harris   Assessment   Prognosis Good   Problem List Decreased strength;Decreased range of motion;Decreased endurance; Impaired balance;Decreased mobility;Obesity   Assessment PT demonstrated improved endurance this session, successfully ambulating repeated household distances with decreased dyspnea compared to AM session    Anny rodriguez slow as pt fatigued, but no LOB noted during ambulation  Pt also able to negotiate repeated curb steps without complaints of excessive fatigue  REquired repetition due to initial dificulty with sequencing to ascend safely  No LOB noted despite requisite correctsion  Pt did demonstrate x2 LOB initially upon standing when attempting to use urinal chairside, but eventually was able to self manage underwear & urinal without incident  Pt instructed to have wife or family present with dynamic balance tasks that required UE use to ensure safety  Pt also encouraged to ambulate & maintain overall mobility at home, working to regain strength, balance & function on affected LE  Pt verbalized understanding to all instrucitons & repors he will be able to safely navigate home with family support  Pt has demonstrated sufficent progress to discharge home with OPPT to progress to PLOF  Goals   Patient Goals to go home   STG Expiration Date 06/26/21   PT Treatment Day 2   Plan   Treatment/Interventions Functional transfer training;LE strengthening/ROM; Elevations; Therapeutic exercise; Endurance training;Patient/family training;Equipment eval/education; Bed mobility;Gait training   Progress Progressing toward goals   PT Frequency Twice a day   Recommendation   PT Discharge Recommendation Home with outpatient rehabilitation   Equipment Recommended   (pt owns dme)   PT - OK to Discharge Yes   Barbara P[address]a [address]   Turning in Bed Without Bedrails 2   Lying on Back to Sitting on Edge of Flat Bed 2   Moving Bed to Chair 3   Standing Up From Chair 3   Walk in Room 4   Climb 3-5 Stairs 3   Basic Mobility Inpatient Raw Score 17   Basic Mobility Standardized Score 39 67   The patient's AM-PAC Basic Mobility Inpatient Short Form Raw Score is 17, Standardized Score is 39 67  A standardized score greater than 42 9 suggests the patient may benefit from discharge to home   Please also refer to the recommendation of the Physical Therapist for safe discharge planning            Pino Landrum, PTA

## 2021-06-17 NOTE — UTILIZATION REVIEW
Initial Clinical Review    Elective Inpatient surgical procedure  Age/Sex: 82 y o  male  Surgery Date: 6/16/21  Procedure: S/p ARTHROPLASTY KNEE TOTAL (Right)  Anesthesia: Choice    POD#1 Progress Note:   Pain control  Drain pull  Analgesic prn  Cont Hold lisinopril/HCTZ post op to decrease risk of TARAN; may resume after 48 hrs or upon DC if needed  Hydralazine 25 mg q 8 prn SBP greater then 150       Admission Orders: Date/Time/Statement:   Admission Orders (From admission, onward)     Ordered        06/16/21 1130  Inpatient Admission  Once                   Orders Placed This Encounter   Procedures    Inpatient Admission     Standing Status:   Standing     Number of Occurrences:   1     Order Specific Question:   Level of Care     Answer:   Med Surg [16]     Order Specific Question:   Estimated length of stay     Answer:   Inpatient Only Surgery     Vital Signs: /76   Pulse 96   Temp 98 9 °F (37 2 °C)   Resp 18   Ht 6' (1 829 m)   Wt 129 kg (284 lb 6 4 oz)   SpO2 (!) 89%   BMI 38 57 kg/m²     Pertinent Labs/Diagnostic Test Results:       Results from last 7 days   Lab Units 06/17/21  0553   WBC Thousand/uL 7 63   HEMOGLOBIN g/dL 12 8   HEMATOCRIT % 38 1   PLATELETS Thousands/uL 205         Results from last 7 days   Lab Units 06/17/21  0553   SODIUM mmol/L 135*   POTASSIUM mmol/L 4 0   CHLORIDE mmol/L 104   CO2 mmol/L 25   ANION GAP mmol/L 6   BUN mg/dL 26*   CREATININE mg/dL 1 36*   EGFR ml/min/1 73sq m 48   CALCIUM mg/dL 9 1         Results from last 7 days   Lab Units 06/16/21  1144   POC GLUCOSE mg/dl 100     Results from last 7 days   Lab Units 06/17/21  0553   GLUCOSE RANDOM mg/dL 133       Diet: Regular  Mobility: Activity as tolerated  DVT Prophylaxis: SCD/ Foot pumps    Medications/Pain Control:   Scheduled Medications:  acetaminophen, 975 mg, Oral, Q8H GABBY  aspirin, 81 mg, Oral, Daily  docusate sodium, 100 mg, Oral, BID  enoxaparin, 40 mg, Subcutaneous, Daily  senna, 1 tablet, Oral, Daily      Continuous IV Infusions:  lactated ringers, 125 mL/hr, Intravenous, Continuous      PRN Meds:  calcium carbonate, 1,000 mg, Oral, Daily PRN  hydrALAZINE, 25 mg, Oral, Q8H PRN  morphine injection, 2 mg, Intravenous, Q2H PRN  ondansetron, 4 mg, Intravenous, Q6H PRN  oxyCODONE, 10 mg, Oral, Q4H PRN 6/16 x2, 6/17 x1  oxyCODONE, 5 mg, Oral, Q4H PRN    Network Utilization Review Department  ATTENTION: Please call with any questions or concerns to [phone] and carefully listen to the prompts so that you are directed to the right person  All voicemails are confidential   [facility] all requests for admission clinical reviews, approved or denied determinations and any other requests to dedicated fax number below belonging to the campus where the patient is receiving treatment   List of dedicated fax numbers for the Facilities:  [address] DENIALS (Administrative/Medical Necessity) [phone]   [address] (Maternity/NICU/Pediatrics) [phone]   [address] [place] [address] Dr Adama Zacarias [id number] [address] [place] [address] [address] [facility]2 [place] [address] [phone]

## 2021-06-17 NOTE — OCCUPATIONAL THERAPY NOTE
Occupational Therapy Evaluation     Patient Name: Pb Gaspar  VOHVA'N Date: 6/17/2021  Problem List  Principal Problem:    S/P total knee arthroplasty, right    Past Medical History  Past Medical History:   Diagnosis Date    Hypertension     Sleep apnea     no CPAP      Past Surgical History  Past Surgical History:   Procedure Laterality Date    CARPAL TUNNEL RELEASE      COLONOSCOPY      OH ARTHROCENTESIS ASPIR&/INJ MAJOR JT/BURSA W/O US  6/14/2018    Procedure: ASPIRATION RIGHT KNEE BURSA; Surgeon: Kaitlin Murray MD;  [facility];  Service: Orthopedics    OH TOTAL KNEE ARTHROPLASTY Left 6/14/2018    Procedure: ARTHROPLASTY KNEE TOTAL;  Surgeon: Kaitlin Murray MD;  [facility];  Service: Orthopedics    OH TOTAL KNEE ARTHROPLASTY Right 6/16/2021    Procedure: ARTHROPLASTY KNEE TOTAL;  Surgeon: Kaitlin Murray MD;  [facility];  Service: Orthopedics      06/17/21 0900   OT Last Visit   OT Visit Date 06/17/21   Note Type   Note type Evaluation   Restrictions/Precautions   Weight Bearing Precautions Per Order Yes   RUE Weight Bearing Per Order WBAT   LUE Weight Bearing Per Order WBAT   RLE Weight Bearing Per Order WBAT   LLE Weight Bearing Per Order WBAT   Other Precautions WBS; Fall Risk;Pain   Pain Assessment   Pain Assessment Tool Pain Assessment not indicated - pt denies pain   Pain Score 4   Pain Location/Orientation Orientation: Right;Location: Knee   Hospital Pain Intervention(s) Repositioned; Ambulation/increased activity; Emotional support;Relaxation technique   Home Living   Type of [address] [place] [address] Two level; Able to live on main level with bedroom/bathroom  (1+1 JUDITH)   Home Equipment Cane   Prior Function   Level of [place] Independent with ADLs and functional mobility   Lives With Spouse   Receives Help From Family   ADL Assistance Independent   IADLs Independent   Falls in the last 6 months 0   Vocational Retired   Lifestyle   Autonomy I adls and moblity - i iadls    Reciprocal Relationships supportive family - reports wife is able to assist prn   Service to Others retired   Intrinsic Gratification active pta    Subjective   Subjective offers no c/o    ADL   Eating Assistance 7  Independent   Grooming Assistance 5  [address] [facility] 5  [address] [facility] 5  [facility]  4  [address]  5  Supervision/Setup   Bed Mobility   Additional Comments oob in chair    Transfers   Sit to [address] to Sit 5  Supervision   Stand pivot 5  Supervision   Functional Mobility   Functional Mobility 5  Supervision   Additional items Rolling walker   Balance   Static Sitting Fair +   Dynamic Sitting Fair   Static Standing Fair   Dynamic Standing [address],Flo[address] 3,4, & 5 -   Activity Tolerance   Activity Tolerance Patient limited by fatigue;Patient limited by pain   RUE Assessment   RUE Assessment WFL   LUE Assessment   LUE Assessment WFL   Cognition   Overall Cognitive Status WFL   Assessment   Limitation Decreased ADL status; Decreased endurance;Decreased self-care trans;Decreased high-level ADLs   Prognosis Good   Assessment Pt is a 82 y o  male who was [facility] on 6/16/2021 with S/P total knee arthroplasty, right   Pt's problem list also includes PMH of HTN, obesity and previous surgery  At baseline pt was completing adls and mobility independently - I iadls - shares homemaking with spouse  Pt lives with wife in 2 story home with [facility] with 1+1 JUDITH  Currently pt requires min assist for overall ADLS and sba for functional mobility/transfers  Pt currently presents with impairments in the following categories -steps to enter environment, difficulty performing ADLS and difficulty performing IADLS  activity tolerance, endurance and standing balance/tolerance   These impairments, as well as pt's fatigue, pain, orthopedic restricitions  and WBS   limit pt's ability to safely engage in all baseline areas of occupation, includingbathing, dressing, functional mobility/transfers, community mobility, laundry , driving, house maintenance, meal prep, cleaning, social participation  and leisure activities however reports wife is supportive and able to provide assist prn - From OT standpoint, recommend home with family support upon D/C  No further acute OT needs indicated at this time - Anticipate d/c home with family support when medically cleared - d/c from caseload   Goals   Patient Goals go home    Plan   OT Frequency Eval only   Recommendation   OT Discharge Recommendation Home with outpatient rehabilitation   OT - OK to Discharge Yes   AM-PAC Daily Activity Inpatient   Lower Body Dressing 3   Bathing 3   Toileting 3   Upper Body Dressing 4   Grooming 4   Eating 4   Daily Activity Raw Score 21   Daily Activity Standardized Score (Calc for Raw Score >=11) 44 27   AM-PAC Applied Cognition Inpatient   Following a Speech/Presentation 4   Understanding Ordinary Conversation 4   Taking Medications 4   Remembering Where Things Are Placed or Put Away 4   Remembering List of 4-5 Errands 4   Taking Care of Complicated Tasks 4   Applied Cognition Raw Score 24   Applied Cognition Standardized Score 62 21     The patient's raw score on the AM-PAC Daily Activity inpatient short form is 21, standardized score is 44 27, greater than 39 4  Patients at this level are likely to benefit from discharge to home  Please refer to the recommendation of the Occupational Therapist for safe discharge planning        May Nicholson

## 2021-06-17 NOTE — PROGRESS NOTES
Internal Medicine Progress Note  Patient: Lorna Baird  Age/sex: 82 y o  male  Medical Record #: [id number]      ASSESSMENT/PLAN: (Interval History)  Lorna Baird is seen and examined and management for following issues:    Status Post right Total KNEE ARTHROPLASTY   Pain controlled   Continue encourage incentive spirometry; monitor fever curve   DVT prophylaxis in place and reviewed   Hgb stable; await AM PT eval    CKD stage III  · Baseline 1 2-1 4 ; stable  · Avoid nephrotoxic medications  · Avoid hypotension  · Monitor BMP - pending  · Renal following  Hypertension   Cont Hold lisinopril/HCTZ post op to decrease risk of TARAN; may resume after 48 hrs or upon DC if needed   Hydralazine 25 mg q 8 prn SBP greater then 150   Reset hold parameters on BP meds to 130mmhg   BP currently acceptable  Vitals:    06/17/21 0723   BP:    Pulse:    Resp:    Temp:    SpO2: 92%       Patient stable for DC from medical standpoint  The above assessment and plan was reviewed and updated as determined by my evaluation of the patient on 6/17/2021      Labs:   Results from last 7 days   Lab Units 06/17/21  0553   WBC Thousand/uL 7 63   HEMOGLOBIN g/dL 12 8   HEMATOCRIT % 38 1   PLATELETS Thousands/uL 205     Results from last 7 days   Lab Units 06/17/21  0553   SODIUM mmol/L 135*   POTASSIUM mmol/L 4 0   CHLORIDE mmol/L 104   CO2 mmol/L 25   BUN mg/dL 26*   CREATININE mg/dL 1 36*   CALCIUM mg/dL 9 1             Results from last 7 days   Lab Units 06/16/21  1144   POC GLUCOSE mg/dl 100       Review of Scheduled Meds:  Current Facility-Administered Medications   Medication Dose Route Frequency Provider Last Rate    acetaminophen  975 mg Oral Q8H [address] PARISH Lee      aspirin  81 mg Oral Daily Rubin Blanco MD      calcium carbonate  1,000 mg Oral Daily PRN Rubin Blanco MD      docusate sodium  100 mg Oral BID Rubin Blanco MD      enoxaparin  40 mg Subcutaneous Daily Rubin Blanco MD     Wash [employer]er hydrALAZINE  25 mg Oral Q8H PRN PARISH Valle      lactated ringers  1,000 mL Intravenous Once PRN Nena Do MD      And    lactated ringers  1,000 mL Intravenous Once PRN Nena Do MD      lactated ringers  125 mL/hr Intravenous Continuous Nena Do MD Stopped (06/17/21 6219)    morphine injection  2 mg Intravenous Q2H PRN Nena Do MD      ondansetron  4 mg Intravenous Q6H PRN Nena Do MD      oxyCODONE  10 mg Oral Q4H PRN Nena Do MD      oxyCODONE  5 mg Oral Q4H PRN Nena Do MD      senna  1 tablet Oral Daily Nena Do MD      sodium chloride  1,000 mL Intravenous Once PRN Nena Do MD      And    sodium chloride  1,000 mL Intravenous Once PRN Nena Do MD         Subjective/ HPI: Patient seen and examined  Patients overnight issues or events were reviewed with nursing or staff during rounds or morning huddle session  New or overnight issues include the following:     Pt without any overnight events or reported nursing issues      ROS:   Pain 3/10; no nausea  A 10 point ROS was performed; negative except as noted above         Imaging:     No orders to display       *Labs /Radiology studies Reviewed  *Medications  reviewed and reconciled as needed  *Please refer to order section for additional ordered labs studies  *Case discussed with primary attending during morning huddle case rounds    Physical Examination:  Vitals:   Vitals:    06/17/21 0307 06/17/21 0557 06/17/21 0717 06/17/21 0723   BP: 129/71  134/74    Pulse: 92  92    Resp: 17  18    Temp: 98 9 °F (37 2 °C)  97 9 °F (36 6 °C)    TempSrc:       SpO2: 93%  91% 92%   Weight:  129 kg (284 lb 6 4 oz)     Height:           General Appearance: no distress, conversive  HEENT: PERRLA, conjuctiva normal; oropharynx clear; mucous membranes moist;   Neck:  Supple, no lymphadenopathy or thyromegaly  Lungs: CTA, normal respiratory effort, no retractions, expiratory effort normal  CV: regular rate and rhythm , PMI normal   ABD: soft non tender, no masses , no hepatic or splenomegaly  EXT: DP pulses intact, no lymphadenopathy, no edema; right surgical dressing in place  Skin: normal turgor, normal texture, no rash  Psych: affect normal, mood normal  Neuro: AAOx3    : voiding    The above physical exam was reviewed and updated as determined by my evaluation of the patient on 6/17/2021  Invasive Devices     Peripheral Intravenous Line            Peripheral IV 06/16/21 Distal;Left;Ventral (anterior) Arm 1 day          Drain            Closed/Suction Drain Anterior;Right Knee Accordion 10 Fr  <1 day                   VTE Pharmacologic Prophylaxis: Enoxaparin  Code Status: Level 1 - Full Code  Current Length of Stay: 1 day(s)      Total floor / unit time spent today 30 minutes  Coordination of patient's care was performed in conjunction with primary service  Time invested included review of patient's labs, vitals, and management of their comorbidities with continued monitoring, examination of patient as well as answering patient questions, documenting her findings and creating progress note in electronic medical record,  ordering appropriate diagnostic testing  ** Please Note:  voice to text software may have been used in the creation of this document   Although proof errors in transcription or interpretation are a potential of such software**

## 2021-06-18 ENCOUNTER — TELEPHONE (OUTPATIENT)
Dept: OBGYN CLINIC | Facility: HOSPITAL | Age: 86
End: 2021-06-18

## 2021-06-18 NOTE — TELEPHONE ENCOUNTER
Patient contacted to complete a postoperative follow-up call assessment  He reports current 1/10 pain, and we confirmed his upcoming appointments for physical therapy on the 21st, and with the surgeon on the 25th  He denies wanting to review his meds at this time, stating that his wife handles them and that she just stepped out of the house  He did state that he is taking the Lovenox injections daily, and he   Had a bowel movement today  he reports swelling is "can not tell" and that he is continued icing  He reports his dressing is dry and denies any drainage  He reports ambulating with a rolling walker and that he is getting to and from the bathroom  He denies any chest pain, shortness of breath, dizziness, fever, or calf pain  He reports "she has knee on the ball" in reference to his wife  I advised the patient that he can call us with questions or concerns at any time

## 2021-06-18 NOTE — UTILIZATION REVIEW
Notification of Discharge   This is a Notification of Discharge from our facility [address]  Please be advised that this patient has been discharge from our facility  Below you will find the admission and discharge date and time including the patients disposition  UTILIZATION REVIEW CONTACT:  Starr Frias  Utilization   Network Utilization Review Department  Phone: [phone] x carefully listen to the prompts  All voicemails are confidential   Email: [email or web address]     PHYSICIAN ADVISORY SERVICES:  FOR [facility] REVIEW - MEDICAL NECESSITY DENIAL  Phone: [phone]  Fax: [phone]  Email: [email or web address]     PRESENTATION DATE: 6/16/2021  6:31 AM  OBERVATION ADMISSION DATE:   INPATIENT ADMISSION DATE: 6/16/21 11:30 AM   DISCHARGE DATE: 6/17/2021  4:36 PM  DISPOSITION: Home/Self Care Home/Self Care      IMPORTANT INFORMATION:  Send all requests for admission clinical reviews, approved or denied determinations and any other requests to dedicated fax number below belonging to the campus where the patient is receiving treatment   List of dedicated fax numbers:  [address] DENIALS (Administrative/Medical Necessity) [phone]   [address] (Maternity/NICU/Pediatrics) [phone]   Josue Challenger [phone]   Bernard Hernandez [phone]   [facility] [place] [phone]   Narda Henson [facility] [address] [place] [address] [phone]   [facility]  [phone]   [address] [facility]  [address] [facility] [address] [facility] [phone]

## 2021-06-21 ENCOUNTER — OFFICE VISIT (OUTPATIENT)
Dept: PHYSICAL THERAPY | Facility: CLINIC | Age: 86
End: 2021-06-21
Payer: COMMERCIAL

## 2021-06-21 DIAGNOSIS — Z96.651 S/P TKR (TOTAL KNEE REPLACEMENT), RIGHT: Primary | ICD-10-CM

## 2021-06-21 PROCEDURE — 97110 THERAPEUTIC EXERCISES: CPT | Performed by: PHYSICAL THERAPIST

## 2021-06-21 PROCEDURE — 97161 PT EVAL LOW COMPLEX 20 MIN: CPT | Performed by: PHYSICAL THERAPIST

## 2021-06-21 NOTE — PROGRESS NOTES
PT Evaluation     Today's date: 2021  Patient name: Tee Toney  : 1935  MRN: [id number]  Referring provider: Maritza Leary MD  Dx:   Encounter Diagnosis     ICD-10-CM    1  S/P TKR (total knee replacement), right  Z96 651 Ambulatory referral to Physical Therapy                  Assessment  Assessment details: Pt is a pleasant 82 y o  male presenting to outpatient physical therapy s/p right TKR on 21  He presents with pain, decreased range of motion, decreased strength, abnormal gait with AD use, and decreased activity tolerance and decreased function  Patient would benefit from skilled physical therapy in order to address said deficits and improve functional mobility  Impairments: abnormal coordination, abnormal gait, abnormal or restricted ROM, abnormal movement, activity intolerance, impaired balance, impaired physical strength, lacks appropriate home exercise program, pain with function, weight-bearing intolerance and poor body mechanics  Understanding of Dx/Px/POC: good   Prognosis: good    Goals  ST  Patient will decrease pain 1 grade  2  Patient will increase knee ROM to at least 90 degrees flexion   3  Pt will be able to don shoe on right LE  3  Patient will demonstrate 1/2 grade improvement in strength in 4 weeks  LT  Patient will be able to perform IADLS without restriction or pain by discharge  2  Patient will be independent in HEP by discharge  3  Patient will be able to return to recreational duties without restriction or pain by discharge  4  Normalize knee ROM to contralateral side  5  Resume driving  6  Pt will ambulate safely with LRAD   7   Increase FOTO to expected score      Plan  Patient would benefit from: PT eval and skilled PT  Planned modality interventions: cryotherapy and thermotherapy: hydrocollator packs  Planned therapy interventions: IADL retraining, body mechanics training, flexibility, functional ROM exercises, home exercise program, neuromuscular re-education, manual therapy, postural training, strengthening, stretching, therapeutic activities, therapeutic exercise and joint mobilization  Frequency: 2x week  Duration in visits: 12  Duration in weeks: 6  Treatment plan discussed with: patient        Subjective Evaluation    History of Present Illness  Date of surgery: 21  Mechanism of injury: Patient is s/ps R TKR on 21 he presents with RW with step to pattern  Is not driving currently  To be able to get into car and go, wants to be able to do yardwork    --------------------------------------------------------------------------------------------  Patient presents for pre-op evaluation for R knee pain, R TKA scheduled   Patient underwent L TKA in   Patient has all assistive devices and home adjustments in place and is not concerned with home access post surgery  "Some pain off and on, sometimes it clicks sometimes it doesn't " Patient denies N/T, swelling, instability  Limited walking tolerance secondary to pain  No medication for pain, seated rest helps to relieve sxs     Pain  Current pain ratin  At worst pain rating: 3    Patient Goals  Patient goals for therapy: decreased pain, independence with ADLs/IADLs and return to sport/leisure activities  Patient goal: return to driving, return to yard work, walk without AD, return to showering normally, get socks and shoes on independently         Objective     Active Range of Motion   Left Knee   Flexion: 110 degrees   Extension: 0 degrees     Right Knee   Flexion: 78 degrees   Extension: -24 degrees     Passive Range of Motion   Left Knee   Flexion: 110 degrees   Extension: -10 degrees     Right Knee   Flexion: 84 degrees   Extension: -16 degrees     Additional Passive Range of Motion Details  5TSTS  : 21 31 sec with arm rests    TU 51 seconds with arm rests and RW     Strength/Myotome Testing     Right Knee   Flexion: 3+  Extension: 3  Quadriceps contraction: good    Additional Strength Details  Hip flexion: 3/5   Ankle strength grossly WFL, (+) peripheral neuropathy         Precautions: R TKA 6/16/2021, L TKA 2018      Manuals 6/21            Patellar mobes  NV            Knee stretching NV                                      Neuro Re-Ed             Quad sets  NV            steamboats             SLS   NV          THE at [employer]   NV                                                  Ther Ex             Bike AAROM 5'            Hamstring stretch steps NV            Heel slides with strap NV            glute bridge    NV          Clamshells   NV           SLR flexion AA NV                          LAQ NV            Standing ham curl NV            marches NV            Hip abd standing  NV            Hip ext standing NV            Heel/toe raises  NV            Ther Activity             Step up and over  NV            Gait tx with RW (heel-toe) NV                                                   Modalities             CP PRN

## 2021-06-22 ENCOUNTER — TELEPHONE (OUTPATIENT)
Dept: NEPHROLOGY | Facility: HOSPITAL | Age: 86
End: 2021-06-22

## 2021-06-22 DIAGNOSIS — I10 ESSENTIAL HYPERTENSION: ICD-10-CM

## 2021-06-22 DIAGNOSIS — N18.31 STAGE 3A CHRONIC KIDNEY DISEASE (HCC): Primary | ICD-10-CM

## 2021-06-22 NOTE — TELEPHONE ENCOUNTER
----- Message from Erum Pandey MD sent at 6/18/2021  7:35 AM EDT -----  Please make sure patient is on recall list for 6 months follow up from last office visit with me with repeat labs (please order a CBC, RFP and UPC before 6 month apt)  Thanks,

## 2021-06-23 ENCOUNTER — OFFICE VISIT (OUTPATIENT)
Dept: PHYSICAL THERAPY | Facility: CLINIC | Age: 86
End: 2021-06-23
Payer: COMMERCIAL

## 2021-06-23 DIAGNOSIS — G89.29 CHRONIC PAIN OF RIGHT KNEE: ICD-10-CM

## 2021-06-23 DIAGNOSIS — M17.11 PRIMARY OSTEOARTHRITIS OF RIGHT KNEE: ICD-10-CM

## 2021-06-23 DIAGNOSIS — Z96.651 S/P TKR (TOTAL KNEE REPLACEMENT), RIGHT: Primary | ICD-10-CM

## 2021-06-23 DIAGNOSIS — M25.561 CHRONIC PAIN OF RIGHT KNEE: ICD-10-CM

## 2021-06-23 PROCEDURE — 97112 NEUROMUSCULAR REEDUCATION: CPT

## 2021-06-23 PROCEDURE — 97110 THERAPEUTIC EXERCISES: CPT

## 2021-06-23 PROCEDURE — 97140 MANUAL THERAPY 1/> REGIONS: CPT

## 2021-06-23 NOTE — PROGRESS NOTES
Daily Note     Today's date: 2021  Patient name: Holly Abdi  : 1935  MRN: [id number]  Referring provider: Willian Fisher MD  Dx:   Encounter Diagnosis     ICD-10-CM    1  S/P TKR (total knee replacement), right  Z96 651    2  Chronic pain of right knee  M25 561     G89 29    3  Primary osteoarthritis of right knee  M17 11                   Subjective: Pt states compliance w/ current medications, including blood thinners  Pt denies calf pain  Objective: See treatment diary below      Assessment: Tolerated treatment well  Patient would benefit from continued PT  R calf girth= 42 25 cm; L= 39 5  No significant increase in discomfort noted w/ manual stretching today  Min assist needed for supine SLR  Plan: Progress treatment as tolerated         Precautions: R TKA 2021, L TKA 2018      Manuals            Patellar mobes  NV NV           Knee stretching NV 10'                                     Neuro Re-Ed             Quad sets  NV 10x5"           steamboats             SLS   NV          THE at [employer]   NV                                                  Ther Ex             Bike AAROM 5' 5'           Hamstring stretch steps NV NV           Heel slides with strap NV 10x5"           glute bridge    NV          Clamshells   NV           SLR flexion AA NV AA  2x10                         LAQ NV 2x10           Standing ham curl NV 15            marches NV x10 ea           Hip abd standing  NV 15x R           Hip ext standing NV 15 R           Heel/toe raises  NV 20 ea           Ther Activity             Step up and over  NV NV           Gait tx with RW (heel-toe) NV NV                                                  Modalities             CP PRN

## 2021-06-25 ENCOUNTER — OFFICE VISIT (OUTPATIENT)
Dept: OBGYN CLINIC | Facility: MEDICAL CENTER | Age: 86
End: 2021-06-25

## 2021-06-25 ENCOUNTER — APPOINTMENT (OUTPATIENT)
Dept: RADIOLOGY | Facility: MEDICAL CENTER | Age: 86
End: 2021-06-25
Payer: COMMERCIAL

## 2021-06-25 VITALS
BODY MASS INDEX: 38.47 KG/M2 | DIASTOLIC BLOOD PRESSURE: 87 MMHG | SYSTOLIC BLOOD PRESSURE: 150 MMHG | HEART RATE: 84 BPM | HEIGHT: 72 IN | WEIGHT: 284 LBS

## 2021-06-25 DIAGNOSIS — Z96.651 S/P TKR (TOTAL KNEE REPLACEMENT), RIGHT: Primary | ICD-10-CM

## 2021-06-25 DIAGNOSIS — Z96.651 S/P TKR (TOTAL KNEE REPLACEMENT), RIGHT: ICD-10-CM

## 2021-06-25 PROCEDURE — 99024 POSTOP FOLLOW-UP VISIT: CPT | Performed by: ORTHOPAEDIC SURGERY

## 2021-06-25 PROCEDURE — 73560 X-RAY EXAM OF KNEE 1 OR 2: CPT

## 2021-06-25 NOTE — PROGRESS NOTES
Assessment:   Diagnosis ICD-10-CM Associated Orders   1  S/P TKR (total knee replacement), right  Z96 651 XR knee 1 or 2 vw right       Plan:  S/p 1 week right total knee arthroplasty  - patient was advised to continue to work on his range of motion   - he is to continue anticoagulation   - he is allowed to shower allowing warm soapy water to run over the incision  -he will follow up in 1 week for staple removal    To do next visit:  Return in about 1 week (around 7/2/2021)  The above stated was discussed in layman's terms and the patient expressed understanding  All questions were answered to the patient's satisfaction  Scribe Attestation    I,:  Artemio Durham PA-C am acting as a scribe while in the presence of the attending physician :       I,:  Amie Pang MD personally performed the services described in this documentation    as scribed in my presence :             Subjective:   Nicolasa Traore is a 82 y o  male who presents 1 week s/p right total knee arthroplasty  Patient states that he is doing well overall  He does note some discomfort over the medial aspect of his knee  He states he continues to take the anticoagulation as prescribed  He denies any numbness or tingling        Review of systems negative unless otherwise specified in HPI  Review of Systems  ROS:   General: no fever, no chills  HEENT:  No loss of hearing or eyesight problems  Eyes:  No red eyes  Respiratory:  No coughing, shortness of breath or wheezing  Cardiovascular:  No chest pain, no palpitations  GI:  Abdomen soft nontender, denies nausea  Endocrine:  No muscle weakness, no frequent urination, no excessive thirst  Urinary:  No dysuria, no incontinence  Musculoskeletal: see HPI and PE  SKIN:  No skin rash, no dry skin  Neurological:  No headaches, no confusion  Psychiatric:  No suicide thoughts, no anxiety, no depression  Review of all other systems is negative    Past Medical History:   Diagnosis Date    Hypertension  Sleep apnea     no CPAP        Past Surgical History:   Procedure Laterality Date    CARPAL TUNNEL RELEASE      COLONOSCOPY      UT ARTHROCENTESIS ASPIR&/INJ MAJOR JT/BURSA W/O US  6/14/2018    Procedure: ASPIRATION RIGHT KNEE BURSA;   Surgeon: Natalee Vinson MD;  [facility]: [facility];  Service: Orthopedics    UT TOTAL KNEE ARTHROPLASTY Left 6/14/2018    Procedure: ARTHROPLASTY KNEE TOTAL;  Surgeon: Natalee Vinson MD;  [facility];  Service: Orthopedics    UT TOTAL KNEE ARTHROPLASTY Right 6/16/2021    Procedure: ARTHROPLASTY KNEE TOTAL;  Surgeon: Natalee Vinson MD;  [facility];  Service: Orthopedics       Family History   Problem Relation Age of Onset    No Known Problems Mother     No Known Problems Father        Social History     Occupational History    Not on file   Tobacco Use    Smoking status: Former Smoker    Smokeless tobacco: Never Used    Tobacco comment: quit 50 years ago    Vaping Use    Vaping Use: Never used   Substance and Sexual Activity    Alcohol use: Not Currently    Drug use: No    Sexual activity: Not on file         Current Outpatient Medications:     acetaminophen (TYLENOL) 325 mg tablet, Take 3 tablets (975 mg total) by mouth every 8 (eight) hours for 10 days, Disp: 90 tablet, Rfl: 0    aspirin 81 mg chewable tablet, Chew daily , Disp: , Rfl:     docusate sodium (COLACE) 100 mg capsule, Take 1 capsule (100 mg total) by mouth 2 (two) times a day, Disp: 10 capsule, Rfl: 0    lisinopril-hydrochlorothiazide (PRINZIDE,ZESTORETIC) 20-25 MG per tablet, Take 1 tablet by mouth daily, Disp: , Rfl:     LUMIGAN 0 01 % ophthalmic drops, Administer into the left eye daily at bedtime , Disp: , Rfl:     oxyCODONE (ROXICODONE) 5 mg immediate release tablet, 1 pill po Q4 Hrs prn, Disp: 30 tablet, Rfl: 0    enoxaparin (LOVENOX) 40 mg/0 4 mL, Inject 0 4 mL (40 mg total) under the skin daily (Patient not taking: Reported on 6/3/2021), Disp: 28 mL, Rfl: 0    No Known Allergies         Vitals:    06/25/21 1407   BP: 150/87   Pulse: 84       Objective:  Physical exam  · General: Awake, Alert, Oriented  · Eyes: Pupils equal, round and reactive to light  · Heart: regular rate and rhythm  · Lungs: No audible wheezing  · Abdomen: soft                  Right Knee Exam     Tenderness   The patient is experiencing tenderness in the medial joint line  Range of Motion   Extension: 5   Flexion: 80     Tests   Varus: negative Valgus: negative    Other   Erythema: absent  Scars: present  Sensation: normal  Pulse: present  Swelling: mild  Effusion: no effusion present            Diagnostics, reviewed and taken today if performed as documented: The attending physician has personally reviewed the pertinent films in PACS and interpretation is as follows:    Right knee x-rays:  Demonstrates hardware in stable alignment with no evidence of loosening  Procedures, if performed today:    Procedures    None performed      Portions of the record may have been created with voice recognition software  Occasional wrong word or "sound a like" substitutions may have occurred due to the inherent limitations of voice recognition software  Read the chart carefully and recognize, using context, where substitutions have occurred

## 2021-06-28 ENCOUNTER — OFFICE VISIT (OUTPATIENT)
Dept: PHYSICAL THERAPY | Facility: CLINIC | Age: 86
End: 2021-06-28
Payer: COMMERCIAL

## 2021-06-28 DIAGNOSIS — M25.561 CHRONIC PAIN OF RIGHT KNEE: ICD-10-CM

## 2021-06-28 DIAGNOSIS — Z96.651 S/P TKR (TOTAL KNEE REPLACEMENT), RIGHT: Primary | ICD-10-CM

## 2021-06-28 DIAGNOSIS — G89.29 CHRONIC PAIN OF RIGHT KNEE: ICD-10-CM

## 2021-06-28 DIAGNOSIS — M17.11 PRIMARY OSTEOARTHRITIS OF RIGHT KNEE: ICD-10-CM

## 2021-06-28 PROCEDURE — 97140 MANUAL THERAPY 1/> REGIONS: CPT | Performed by: PHYSICAL THERAPIST

## 2021-06-28 PROCEDURE — 97530 THERAPEUTIC ACTIVITIES: CPT | Performed by: PHYSICAL THERAPIST

## 2021-06-28 PROCEDURE — 97112 NEUROMUSCULAR REEDUCATION: CPT | Performed by: PHYSICAL THERAPIST

## 2021-06-28 NOTE — PROGRESS NOTES
Daily Note     Today's date: 2021  Patient name: Raul Concepcion  : 1935  MRN: [id number]  Referring provider: Charan Lane MD  Dx:   Encounter Diagnosis     ICD-10-CM    1  S/P TKR (total knee replacement), right  Z96 651    2  Chronic pain of right knee  M25 561     G89 29    3  Primary osteoarthritis of right knee  M17 11                   Subjective: Patient reports he has not used CP or ice at home  He has had no complaints after last session  Objective: See treatment diary below      Assessment: Tolerated treatment well  Patient would benefit from continued PT  Patient fatigues quickly with SLR flexion, even with assist  He notes most discomfort with quad sets, reporting he couldn't not do much more than 10 reps  Tactile cues needed for proper quad activation  He has good tolerance to knee PROM, obtaining > 90 degrees flexion today  Pt requests CP post session  Plan: Progress treatment as tolerated         Precautions: R TKA 2021, L TKA 2018      Manuals           Patellar mobes  NV NV NV          Knee stretching NV 10' 15'                                    Neuro Re-Ed             Quad sets  NV 10x5" 10x5" 15x         steamboats             SLS             THE at [employer]   NV                                                  Ther Ex             Bike AAROM 5' 5' 5'           Hamstring stretch steps NV NV           Heel slides with strap NV 10x5" 20x5"          glute bridge    NV          Clamshells       NV       SLR flexion AA NV AA  2x10 x10 AA                         LAQ NV 2x10 NV          Standing ham curl NV 15  2x10 R          marches NV x10 ea x20 bl          Hip abd standing  NV 15x R x20 R          Hip ext standing NV 15 R x20 R          Heel/toe raises  NV 20 ea 2x10          Ther Activity             Step up and over  NV NV NV          Gait tx with RW (heel-toe) NV NV                                                  Modalities             CP PRN   8'

## 2021-07-01 ENCOUNTER — OFFICE VISIT (OUTPATIENT)
Dept: PHYSICAL THERAPY | Facility: CLINIC | Age: 86
End: 2021-07-01
Payer: COMMERCIAL

## 2021-07-01 DIAGNOSIS — G89.29 CHRONIC PAIN OF RIGHT KNEE: ICD-10-CM

## 2021-07-01 DIAGNOSIS — M17.11 PRIMARY OSTEOARTHRITIS OF RIGHT KNEE: ICD-10-CM

## 2021-07-01 DIAGNOSIS — M25.561 CHRONIC PAIN OF RIGHT KNEE: ICD-10-CM

## 2021-07-01 DIAGNOSIS — Z96.651 S/P TKR (TOTAL KNEE REPLACEMENT), RIGHT: Primary | ICD-10-CM

## 2021-07-01 PROCEDURE — 97530 THERAPEUTIC ACTIVITIES: CPT | Performed by: PHYSICAL THERAPIST

## 2021-07-01 PROCEDURE — 97140 MANUAL THERAPY 1/> REGIONS: CPT | Performed by: PHYSICAL THERAPIST

## 2021-07-01 PROCEDURE — 97112 NEUROMUSCULAR REEDUCATION: CPT | Performed by: PHYSICAL THERAPIST

## 2021-07-01 NOTE — PROGRESS NOTES
Daily Note     Today's date: 2021  Patient name: Lorna Arriaga  : 1935  MRN: [id number]  Referring provider: Carlos Hernandez MD  Dx:   Encounter Diagnosis     ICD-10-CM    1  S/P TKR (total knee replacement), right  Z96 651                 Subjective: Patient reports he felt rough with PROM stretching after last session  Patient has not used CP or ice at home  He gets his staples out tomorrow (21)  Objective: See treatment diary below      Assessment: Tolerated treatment well  Patient would benefit from continued PT  Patient tolerated SLR flexion well without assistance  He has good tolerance to knee PROM, obtaining > 90 degrees flexion  Pt requests CP post session  I educated patient to ice at home if still sore after session today  Plan: Progress treatment as tolerated         Precautions: R TKA 2021, L TKA 2018      Manuals          Patellar mobes  NV NV NV NV         Knee stretching NV 10' 15' 15'                                   Neuro Re-Ed             Quad sets  NV 10x5" 10x5" 15x5"         steamboats             SLS             THE at [employer]   NV                                                  Ther Ex             Bike AAROM 5' 5' 5'  5'         Hamstring stretch steps NV NV           Heel slides with strap NV 10x5" 20x5" 20x5"         glute bridge    NV          Clamshells       NV       SLR flexion AA NV AA  2x10 x10 AA  x15 indp                       SAQ    2x10         LAQ NV 2x10 NV NV         Standing ham curl NV 15  2x10 R 2x10          marches NV x10 ea x20 bl x20 bl         Hip abd standing  NV 15x R x20 R x20 R         Hip ext standing NV 15 R x20 R x20 R         Heel/toe raises  NV 20 ea 2x10 2x10         Ther Activity             Step up and over  NV NV NV NV         Gait tx with RW (heel-toe) NV NV                                                  Modalities             CP PRN   8' 8'                          Patient treated by Nelia Padilla, RABIA under my supervision    Daisy Milian, PT,DPT, ATC

## 2021-07-02 ENCOUNTER — OFFICE VISIT (OUTPATIENT)
Dept: OBGYN CLINIC | Facility: MEDICAL CENTER | Age: 86
End: 2021-07-02

## 2021-07-02 VITALS
HEIGHT: 72 IN | SYSTOLIC BLOOD PRESSURE: 133 MMHG | HEART RATE: 106 BPM | DIASTOLIC BLOOD PRESSURE: 73 MMHG | WEIGHT: 284 LBS | BODY MASS INDEX: 38.47 KG/M2

## 2021-07-02 DIAGNOSIS — Z96.651 S/P TKR (TOTAL KNEE REPLACEMENT), RIGHT: ICD-10-CM

## 2021-07-02 PROCEDURE — 99024 POSTOP FOLLOW-UP VISIT: CPT | Performed by: ORTHOPAEDIC SURGERY

## 2021-07-02 RX ORDER — OXYCODONE HYDROCHLORIDE 5 MG/1
TABLET ORAL
Qty: 20 TABLET | Refills: 0 | Status: SHIPPED | OUTPATIENT
Start: 2021-07-02

## 2021-07-02 NOTE — PROGRESS NOTES
Surgery: 6/16/2021 Right total knee arthroplasty    S: Dennis Macedo is doing well post-operatively  Pain is controlled  Denies new injury or trauma  Denies fever or chills  Admits to mild swelling in right foot    /73 (BP Location: Right arm, Patient Position: Sitting, Cuff Size: Standard)   Pulse (!) 106   Ht 6' (1 829 m)   Wt 129 kg (284 lb)   BMI 38 52 kg/m²     O: right knee  Incision - without erythema or drainage  Staples removed  Steri-strips applied  Extension: near full   Flexion: 80 active  Mild +1 edema  NVI  SI    A/P: 2 weeks s/p right total knee  1  Continue with PT  2  HEP per PT  3  Tylenol and ice if needed for pain  4  Follow up in 4 weeks (6 weeks post-op) - x-rays of right knee next visit  5  WBAT RLE  6  Cont with DVT ppx x 2 more weeks    Refill of oxy provided     I erroneously took arthroscopic this note  This note up until this point was prepared by the physician assistant  My attestation is as below   patient was seen and examined today  Case was reviewed with the physician assistant    I agree with the history, exam, assessment, plan as documented by the physician assistant

## 2021-07-06 ENCOUNTER — OFFICE VISIT (OUTPATIENT)
Dept: PHYSICAL THERAPY | Facility: CLINIC | Age: 86
End: 2021-07-06
Payer: COMMERCIAL

## 2021-07-06 DIAGNOSIS — G89.29 CHRONIC PAIN OF RIGHT KNEE: ICD-10-CM

## 2021-07-06 DIAGNOSIS — Z96.651 S/P TKR (TOTAL KNEE REPLACEMENT), RIGHT: Primary | ICD-10-CM

## 2021-07-06 DIAGNOSIS — M17.11 PRIMARY OSTEOARTHRITIS OF RIGHT KNEE: ICD-10-CM

## 2021-07-06 DIAGNOSIS — M25.561 CHRONIC PAIN OF RIGHT KNEE: ICD-10-CM

## 2021-07-06 PROCEDURE — 97112 NEUROMUSCULAR REEDUCATION: CPT

## 2021-07-06 PROCEDURE — 97140 MANUAL THERAPY 1/> REGIONS: CPT

## 2021-07-06 PROCEDURE — 97530 THERAPEUTIC ACTIVITIES: CPT

## 2021-07-06 NOTE — PROGRESS NOTES
Daily Note     Today's date: 2021  Patient name: Tejal Tidwell  : 1935  MRN: [id number]  Referring provider: Iesha Fulton MD  Dx:   Encounter Diagnosis     ICD-10-CM    1  S/P TKR (total knee replacement), right  Z96 651    2  Chronic pain of right knee  M25 561     G89 29    3  Primary osteoarthritis of right knee  M17 11        Subjective: Patient noted R knee is feeling a little bit better  Objective: See treatment diary below      Assessment: Tolerated treatment fair  Patient was able to perform listed exercises w/o complaints  Patient performed exercises with good technique  Patient would benefit from continued PT  Plan: Continue per plan of care        Precautions: R TKA 2021, L TKA 2018      Manuals         Patellar mobes  NV NV NV NV NV        Knee stretching NV 10' 15' 15' 15'                                  Neuro Re-Ed             Quad sets  NV 10x5" 10x5" 15x5" 5"x15        steamboats             SLS             THE at [employer]   NV                                                  Ther Ex             Bike AAROM 5' 5' 5'  5' 5'        Hamstring stretch steps NV NV           Heel slides with strap NV 10x5" 20x5" 20x5" 5" x20        glute bridge    NV          Clamshells              SLR flexion AA NV AA  2x10 x10 AA  x15 indp X 15 indp                      SAQ    2x10         LAQ NV 2x10 NV NV         Standing ham curl NV 15  2x10 R 2x10  2 x10         marches NV x10 ea x20 bl x20 bl X 20 bl         Hip abd standing  NV 15x R x20 R x20 R X 20 R         Hip ext standing NV 15 R x20 R x20 R X 20 R         Heel/toe raises  NV 20 ea 2x10 2x10 2x10        Ther Activity             Step up and over  NV NV NV NV         Gait tx with RW (heel-toe) NV NV                                                  Modalities             CP PRN   8' 8' 8'

## 2021-07-08 ENCOUNTER — OFFICE VISIT (OUTPATIENT)
Dept: PHYSICAL THERAPY | Facility: CLINIC | Age: 86
End: 2021-07-08
Payer: COMMERCIAL

## 2021-07-08 DIAGNOSIS — M17.11 PRIMARY OSTEOARTHRITIS OF RIGHT KNEE: ICD-10-CM

## 2021-07-08 DIAGNOSIS — Z96.651 S/P TKR (TOTAL KNEE REPLACEMENT), RIGHT: Primary | ICD-10-CM

## 2021-07-08 DIAGNOSIS — G89.29 CHRONIC PAIN OF RIGHT KNEE: ICD-10-CM

## 2021-07-08 DIAGNOSIS — M25.561 CHRONIC PAIN OF RIGHT KNEE: ICD-10-CM

## 2021-07-08 PROCEDURE — 97112 NEUROMUSCULAR REEDUCATION: CPT

## 2021-07-08 PROCEDURE — 97140 MANUAL THERAPY 1/> REGIONS: CPT

## 2021-07-08 PROCEDURE — 97530 THERAPEUTIC ACTIVITIES: CPT

## 2021-07-08 NOTE — PROGRESS NOTES
Daily Note     Today's date: 2021  Patient name: Sima Toscano  : 1935  MRN: [id number]  Referring provider: Jesus Singh MD  Dx:   Encounter Diagnosis     ICD-10-CM    1  S/P TKR (total knee replacement), right  Z96 651    2  Chronic pain of right knee  M25 561     G89 29    3  Primary osteoarthritis of right knee  M17 11                   Subjective: Patient noted no new complaints  Objective: See treatment diary below      Assessment: Tolerated treatment fair  Added SAQ back into treatment w/o patient complaints  Patient challenged with last two reps for SLR flexion  Patient performed exercises with correct form  Plan: Continue per plan of care        Precautions: R TKA 2021, L TKA 2018      Manuals        Patellar mobes  NV NV NV NV NV        Knee stretching NV 10' 15' 15' 15' 10'                                 Neuro Re-Ed             Quad sets  NV 10x5" 10x5" 15x5" 5"x15 5"x15        steamboats             SLS             THE at [employer]   NV                                                  Ther Ex             Bike AAROM 5' 5' 5'  5' 5' 5'       Hamstring stretch steps NV NV           Heel slides with strap NV 10x5" 20x5" 20x5" 5" x20 5"x20       glute bridge    NV          Clamshells              SLR flexion AA NV AA  2x10 x10 AA  x15 indp X 15 indp x15 indp                     SAQ    2x10  2x10       LAQ NV 2x10 NV NV         Standing ham curl NV 15  2x10 R 2x10  2 x10  2x10       marches NV x10 ea x20 bl x20 bl X 20 bl  X 20 bl        Hip abd standing  NV 15x R x20 R x20 R X 20 R  X 20 R        Hip ext standing NV 15 R x20 R x20 R X 20 R  X 20 R        Heel/toe raises  NV 20 ea 2x10 2x10 2x10 2x10       Ther Activity             Step up and over  NV NV NV NV         Gait tx with RW (heel-toe) NV NV                                                  Modalities             CP PRN   8' 8' 8'

## 2021-07-12 ENCOUNTER — EVALUATION (OUTPATIENT)
Dept: PHYSICAL THERAPY | Facility: CLINIC | Age: 86
End: 2021-07-12
Payer: COMMERCIAL

## 2021-07-12 DIAGNOSIS — Z96.651 S/P TKR (TOTAL KNEE REPLACEMENT), RIGHT: Primary | ICD-10-CM

## 2021-07-12 DIAGNOSIS — M17.11 PRIMARY OSTEOARTHRITIS OF RIGHT KNEE: ICD-10-CM

## 2021-07-12 DIAGNOSIS — M25.561 CHRONIC PAIN OF RIGHT KNEE: ICD-10-CM

## 2021-07-12 DIAGNOSIS — G89.29 CHRONIC PAIN OF RIGHT KNEE: ICD-10-CM

## 2021-07-12 PROCEDURE — 97112 NEUROMUSCULAR REEDUCATION: CPT | Performed by: PHYSICAL THERAPIST

## 2021-07-12 PROCEDURE — 97110 THERAPEUTIC EXERCISES: CPT | Performed by: PHYSICAL THERAPIST

## 2021-07-12 PROCEDURE — 97140 MANUAL THERAPY 1/> REGIONS: CPT | Performed by: PHYSICAL THERAPIST

## 2021-07-12 NOTE — PROGRESS NOTES
Daily Note     Today's date: 2021  Patient name: Madyson Monroe  : 1935  MRN: [id number]  Referring provider: Jose Guzmán MD  Dx:   Encounter Diagnosis     ICD-10-CM    1  S/P TKR (total knee replacement), right  Z96 651    2  Chronic pain of right knee  M25 561     G89 29    3  Primary osteoarthritis of right knee  M17 11                   Subjective: Patient reports he is not having a ton of pain but his foot is still swelling up a lot which has him concerned  Objective: See treatment diary below      Assessment: Tolerated treatment well  Increased resistance for LAQ today without difficulty or complaints  He has no significant challenge with standing strengthening exercises  Most tenderness with PROM though pt is obtaining near end ROM with extension > flexion  Continue progressing pt as tolerated with strengthening exercises  Plan: Continue per plan of care        Precautions: R TKA 2021, L TKA 2018      Manuals  7      Patellar mobes  NV NV NV NV NV  2'      Knee stretching NV 10' 15' 15' 15' 10' 10'                                Neuro Re-Ed             Quad sets  NV 10x5" 10x5" 15x5" 5"x15 5"x15  15x5"      steamboats             SLS             THE at [employer]   NV                                                  Ther Ex             Bike AAROM 5' 5' 5'  5' 5' 5' 5'      TB side steps  NV NV     x2 laps R TB       Heel slides with strap NV 10x5" 20x5" 20x5" 5" x20 5"x20 15x5"       glute bridge    NV    2x10       Clamshells              SLR flexion AA NV AA  2x10 x10 AA  x15 indp X 15 indp x15 indp 2x10                     LAQ NV 2x10 NV NV  3# 2x10 Knee ext machine       Standing ham curl NV 15  2x10 R 2x10  2 x10  2x10 Ham curl machine       marches NV x10 ea x20 bl x20 bl X 20 bl  X 20 bl  2#      Hip abd standing  NV 15x R x20 R x20 R X 20 R  X 20 R  2#      Hip ext standing NV 15 R x20 R x20 R X 20 R  X 20 R  2#      Heel/toe raises  NV 20 ea 2x10 2x10 2x10 2x10       Ther Activity             Step up and over  NV NV NV NV         Gait tx with RW (heel-toe) NV NV                                                  Modalities             CP PRN   8' 8' 8'

## 2021-07-15 ENCOUNTER — OFFICE VISIT (OUTPATIENT)
Dept: PHYSICAL THERAPY | Facility: CLINIC | Age: 86
End: 2021-07-15
Payer: COMMERCIAL

## 2021-07-15 DIAGNOSIS — M17.11 PRIMARY OSTEOARTHRITIS OF RIGHT KNEE: ICD-10-CM

## 2021-07-15 DIAGNOSIS — G89.29 CHRONIC PAIN OF RIGHT KNEE: ICD-10-CM

## 2021-07-15 DIAGNOSIS — Z96.651 S/P TKR (TOTAL KNEE REPLACEMENT), RIGHT: Primary | ICD-10-CM

## 2021-07-15 DIAGNOSIS — M25.561 CHRONIC PAIN OF RIGHT KNEE: ICD-10-CM

## 2021-07-15 PROCEDURE — 97140 MANUAL THERAPY 1/> REGIONS: CPT

## 2021-07-15 PROCEDURE — 97110 THERAPEUTIC EXERCISES: CPT

## 2021-07-15 PROCEDURE — 97112 NEUROMUSCULAR REEDUCATION: CPT

## 2021-07-15 NOTE — PROGRESS NOTES
Daily Note     Today's date: 7/15/2021  Patient name: Charito Carreon  : 1935  MRN: [id number]  Referring provider: Jeff Triplett MD  Dx:   Encounter Diagnosis     ICD-10-CM    1  S/P TKR (total knee replacement), right  Z96 651    2  Chronic pain of right knee  M25 561     G89 29    3  Primary osteoarthritis of right knee  M17 11                   Subjective: Pt reports no new complaints  Pt reports he has been sleeping better, but continues to sleep in the recliner  Objective: See treatment diary below      Assessment: Tolerated treatment fair  Patient would benefit from continued PT  Tolerates addition of 2lbs to standing hip 3 way fairly well  PROM limited by discomfort, swelling  Incision healing nicely  Pt continues to ambulate w/ RW but is not completely dependent on it; progress to [facility]? Plan: Progress treatment as tolerated         Precautions: R TKA 2021, L TKA       Manuals 6/21 6/23 6/28 7/1 7/6 7/8 7/12 7/15     Patellar mobes  NV NV NV NV NV  2'      Knee stretching NV 10' 15' 15' 15' 10' 10' 12'                               Neuro Re-Ed             Quad sets  NV 10x5" 10x5" 15x5" 5"x15 5"x15  15x5" 15x5"     steamboats             SLS             THE at [employer]   NV                                                  Ther Ex             Bike AAROM 5' 5' 5'  5' 5' 5' 5' 5'     TB side steps  NV NV     x2 laps R TB  RTB NV     Heel slides with strap NV 10x5" 20x5" 20x5" 5" x20 5"x20 15x5"  15x5"     glute bridge    NV    2x10  2x10     Clamshells              SLR flexion AA NV AA  2x10 x10 AA  x15 indp X 15 indp x15 indp 2x10  2x10                   LAQ NV 2x10 NV NV  3# 2x10 Knee ext machine  Knee ext mach 11# NV     Standing ham curl NV 15  2x10 R 2x10  2 x10  2x10 Ham curl machine  Ham curl mach 11# NV     marches NV x10 ea x20 bl x20 bl X 20 bl  X 20 bl  2# 2# 20 ea     Hip abd standing  NV 15x R x20 R x20 R X 20 R  X 20 R  2# 2# 20 ea     Hip ext standing NV 15 R x20 R x20 R X 20 R  X 20 R  2# 2# 20 ea     Heel/toe raises  NV 20 ea 2x10 2x10 2x10 2x10       Ther Activity             Step up and over  NV NV NV NV         Gait tx with RW (heel-toe) NV NV                                                  Modalities             CP PRN   8' 8' 8'   8'                  1:1 [id number]

## 2021-07-19 ENCOUNTER — OFFICE VISIT (OUTPATIENT)
Dept: PHYSICAL THERAPY | Facility: CLINIC | Age: 86
End: 2021-07-19
Payer: COMMERCIAL

## 2021-07-19 DIAGNOSIS — M17.11 PRIMARY OSTEOARTHRITIS OF RIGHT KNEE: ICD-10-CM

## 2021-07-19 DIAGNOSIS — M25.561 CHRONIC PAIN OF RIGHT KNEE: ICD-10-CM

## 2021-07-19 DIAGNOSIS — G89.29 CHRONIC PAIN OF RIGHT KNEE: ICD-10-CM

## 2021-07-19 DIAGNOSIS — Z96.651 S/P TKR (TOTAL KNEE REPLACEMENT), RIGHT: Primary | ICD-10-CM

## 2021-07-19 PROCEDURE — 97110 THERAPEUTIC EXERCISES: CPT | Performed by: PHYSICAL THERAPIST

## 2021-07-19 PROCEDURE — 97140 MANUAL THERAPY 1/> REGIONS: CPT | Performed by: PHYSICAL THERAPIST

## 2021-07-19 PROCEDURE — 97010 HOT OR COLD PACKS THERAPY: CPT | Performed by: PHYSICAL THERAPIST

## 2021-07-19 PROCEDURE — 97112 NEUROMUSCULAR REEDUCATION: CPT | Performed by: PHYSICAL THERAPIST

## 2021-07-19 NOTE — PROGRESS NOTES
Daily Note     Today's date: 2021  Patient name: Guillermo Sesay  : 1935  MRN: [id number]  Referring provider: Supa Boothe MD  Dx:   Encounter Diagnosis     ICD-10-CM    1  S/P TKR (total knee replacement), right  Z96 651    2  Chronic pain of right knee  M25 561     G89 29    3  Primary osteoarthritis of right knee  M17 11        Start Time: 1120  Stop Time: 1207  Total time in clinic (min): 47 minutes    Subjective: Pt reports he feels like he is improving and getting better  Patient attempted to sleep in bed a week ago and felt discomfort so continues to sleep in recliner  Objective: See treatment diary below      Assessment: Tolerated treatment well  Patient would benefit from continued PT  Patient states knee ext and hamstring curls felt easy so progress as needed  Patient pho[place] as if his leg was heavy when performing SLR flexion  Patient notes stiffness when performing PROM extension  Plan: Progress treatment as tolerated         Precautions: R TKA 2021, L TKA       Manuals 6/21 6/23 6/28 7/1 7/6 7/8 7/12 7/15 7/19    Patellar mobes  NV NV NV NV NV  2'      Knee stretching NV 10' 15' 15' 15' 10' 10' 12' 15'                              Neuro Re-Ed             Quad sets  NV 10x5" 10x5" 15x5" 5"x15 5"x15  15x5" 15x5"     steamboats             SLS             THE at [employer]   NV                                                  Ther Ex             Bike AAROM 5' 5' 5'  5' 5' 5' 5' 5' 5'    TB side steps  NV NV     x2 laps R TB  RTB NV RTB x2 laps    Heel slides with strap NV 10x5" 20x5" 20x5" 5" x20 5"x20 15x5"  15x5" 15x5"    glute bridge    NV    2x10  2x10     Clamshells              SLR flexion AA NV AA  2x10 x10 AA  x15 indp X 15 indp x15 indp 2x10  2x10 2x10                  LAQ NV 2x10 NV NV  3# 2x10 Knee ext machine  Knee ext mach 11# NV Knee ext mach 22# 2x10 33#    Standing ham curl NV 15  2x10 R 2x10  2 x10  2x10 Ham curl machine  Ham curl mach 11# NV Ham curl mach 22#  2x10 33#    marches NV x10 ea x20 bl x20 bl X 20 bl  X 20 bl  2# 2# 20 ea 2 5# 20 ea 3#    Hip abd standing  NV 15x R x20 R x20 R X 20 R  X 20 R  2# 2# 20 ea 2 5# 20 R 3#   Hip ext standing NV 15 R x20 R x20 R X 20 R  X 20 R  2# 2# 20 ea 2 5# 20 R 3#   Heel/toe raises  NV 20 ea 2x10 2x10 2x10 2x10       Ther Activity             Step up and over  NV NV NV NV         Gait tx with RW (heel-toe) NV NV                                                  Modalities             CP PRN   8' 8' 8'   8' 8'                     Patient treated by Camila Murillo, SPT under my supervision    Aura Ward, PT,DPT, ATC

## 2021-07-22 ENCOUNTER — OFFICE VISIT (OUTPATIENT)
Dept: PHYSICAL THERAPY | Facility: CLINIC | Age: 86
End: 2021-07-22
Payer: COMMERCIAL

## 2021-07-22 DIAGNOSIS — G89.29 CHRONIC PAIN OF RIGHT KNEE: ICD-10-CM

## 2021-07-22 DIAGNOSIS — Z96.651 S/P TKR (TOTAL KNEE REPLACEMENT), RIGHT: Primary | ICD-10-CM

## 2021-07-22 DIAGNOSIS — M25.561 CHRONIC PAIN OF RIGHT KNEE: ICD-10-CM

## 2021-07-22 DIAGNOSIS — M17.11 PRIMARY OSTEOARTHRITIS OF RIGHT KNEE: ICD-10-CM

## 2021-07-22 PROCEDURE — 97140 MANUAL THERAPY 1/> REGIONS: CPT

## 2021-07-22 PROCEDURE — 97112 NEUROMUSCULAR REEDUCATION: CPT

## 2021-07-22 PROCEDURE — 97110 THERAPEUTIC EXERCISES: CPT

## 2021-07-22 NOTE — PROGRESS NOTES
Daily Note     Today's date: 2021  Patient name: Geno Jiménez  : 1935  MRN: [id number]  Referring provider: Sujata William MD  Dx:   Encounter Diagnosis     ICD-10-CM    1  S/P TKR (total knee replacement), right  Z96 651    2  Chronic pain of right knee  M25 561     G89 29    3  Primary osteoarthritis of right knee  M17 11                   Subjective: Pt states no new complaints  Objective: See treatment diary below      Assessment: Tolerated treatment well  Patient would benefit from continued PT  Added 6" step ups to program today w/out significant difficulty  ROM continues to be limited by swelling, discomfort; however, pt continues to progress and incision is healing nicely  Plan: Progress treatment as tolerated         Precautions: R TKA 2021, L TKA 2018      Manuals 6/21 6/23 6/28 7/1 7/6 7/8 7/12 7/15 7/19 7/22   Patellar mobes  NV NV NV NV NV  2'      Knee stretching NV 10' 15' 15' 15' 10' 10' 12' 15' 15'                             Neuro Re-Ed             Quad sets  NV 10x5" 10x5" 15x5" 5"x15 5"x15  15x5" 15x5"     steamboats             SLS             THE at [employer]   NV                                                  Ther Ex             Bike AAROM 5' 5' 5'  5' 5' 5' 5' 5' 5' 5'   TB side steps  NV NV     x2 laps R TB  RTB NV RTB x2 laps RTB 2 laps   Heel slides with strap NV 10x5" 20x5" 20x5" 5" x20 5"x20 15x5"  15x5" 15x5"    glute bridge    NV    2x10  2x10     Clamshells              SLR flexion AA NV AA  2x10 x10 AA  x15 indp X 15 indp x15 indp 2x10  2x10 2x10 2x10                 LAQ NV 2x10 NV NV  3# 2x10 Knee ext machine  Knee ext mach 11# NV Knee ext mach 22# 2x10 33# 2x10   Standing ham curl NV 15  2x10 R 2x10  2 x10  2x10 Ham curl machine  Ham curl mach 11# NV Ham curl mach 22#  2x10 33# 2x10   marches NV x10 ea x20 bl x20 bl X 20 bl  X 20 bl  2# 2# 20 ea 2 5# 20 ea 3# 20 ea   Hip abd standing  NV 15x R x20 R x20 R X 20 R  X 20 R  2# 2# 20 ea 2 5# 20 R 3# 20 R Hip ext standing NV 15 R x20 R x20 R X 20 R  X 20 R  2# 2# 20 ea 2 5# 20 R 3# 20 R   Heel/toe raises  NV 20 ea 2x10 2x10 2x10 2x10       Ther Activity             Step up and over  NV NV NV NV      6" up only 2x10   Gait tx with RW (heel-toe) NV NV                                                  Modalities             CP PRN   8' 8' 8'   8' 8' 8'

## 2021-07-26 ENCOUNTER — EVALUATION (OUTPATIENT)
Dept: PHYSICAL THERAPY | Facility: CLINIC | Age: 86
End: 2021-07-26
Payer: COMMERCIAL

## 2021-07-26 DIAGNOSIS — G89.29 CHRONIC PAIN OF RIGHT KNEE: ICD-10-CM

## 2021-07-26 DIAGNOSIS — M25.561 CHRONIC PAIN OF RIGHT KNEE: ICD-10-CM

## 2021-07-26 DIAGNOSIS — Z96.651 S/P TKR (TOTAL KNEE REPLACEMENT), RIGHT: Primary | ICD-10-CM

## 2021-07-26 DIAGNOSIS — M17.11 PRIMARY OSTEOARTHRITIS OF RIGHT KNEE: ICD-10-CM

## 2021-07-26 PROCEDURE — 97110 THERAPEUTIC EXERCISES: CPT

## 2021-07-26 PROCEDURE — 97112 NEUROMUSCULAR REEDUCATION: CPT

## 2021-07-26 PROCEDURE — 97140 MANUAL THERAPY 1/> REGIONS: CPT

## 2021-07-26 NOTE — PROGRESS NOTES
Daily Note     Today's date: 2021  Patient name: Chriss Hu  : 1935  MRN: [id number]  Referring provider: Irena Sevilla MD  Dx:   Encounter Diagnosis     ICD-10-CM    1  S/P TKR (total knee replacement), right  Z96 651    2  Chronic pain of right knee  M25 561     G89 29    3  Primary osteoarthritis of right knee  M17 11                   Subjective: Pt presents w/ SPC today, but depends on it very little  Pt was advised he was using the cane on the wrong side, but pt states he prefers to use it on the R     Objective: See treatment diary below      Assessment: Tolerated treatment fair  Patient would benefit from continued PT  Occasional cues needed for proper technique  Added step up and overs w/out increased discomfort  ROM continues to be limited by swelling  Reminded pt about propping up R LE and use of compression stockings to help decreased swelling  Plan: Progress treatment as tolerated         Precautions: R TKA 2021, L TKA 2018      Manuals    Patellar mobes              Knee stretching 12'         15'                             Neuro Re-Ed             Quad sets         15x5"     steamboats             SLS             [facility] 5'        5' 5'   TB side steps  RTB 2 laps        RTB x2 laps RTB 2 laps   Heel slides with strap 15x5"        15x5"    glute bridge              Clamshells              SLR flexion 2x10        2x10 2x10                 Knee ext mach 33# x25        Knee ext mach 22# 2x10 33# 2x10   Standing ham curl 33# x25        Ham curl mach 22#  2x10 33# 2x10   marches 3# 20 ea        2 5# 20 ea 3# 20 ea   Hip abd standing  3# 20 R        2 5# 20 R 3# 20 R   Hip ext standing 3# 20 R        2 5# 20 R 3# 20 R   Heel/toe raises              Ther Activity             Step up and over  6" up and over x15         6" up only 2x10   Gait tx with RW (heel-toe) Modalities             CP PRN 8'        8' 8'

## 2021-07-29 ENCOUNTER — EVALUATION (OUTPATIENT)
Dept: PHYSICAL THERAPY | Facility: CLINIC | Age: 86
End: 2021-07-29
Payer: COMMERCIAL

## 2021-07-29 DIAGNOSIS — G89.29 CHRONIC PAIN OF RIGHT KNEE: ICD-10-CM

## 2021-07-29 DIAGNOSIS — M17.11 PRIMARY OSTEOARTHRITIS OF RIGHT KNEE: ICD-10-CM

## 2021-07-29 DIAGNOSIS — M25.561 CHRONIC PAIN OF RIGHT KNEE: ICD-10-CM

## 2021-07-29 DIAGNOSIS — Z96.651 S/P TKR (TOTAL KNEE REPLACEMENT), RIGHT: Primary | ICD-10-CM

## 2021-07-29 PROCEDURE — 97530 THERAPEUTIC ACTIVITIES: CPT | Performed by: PHYSICAL THERAPIST

## 2021-07-29 PROCEDURE — 97110 THERAPEUTIC EXERCISES: CPT | Performed by: PHYSICAL THERAPIST

## 2021-07-29 PROCEDURE — 97140 MANUAL THERAPY 1/> REGIONS: CPT | Performed by: PHYSICAL THERAPIST

## 2021-07-29 NOTE — PROGRESS NOTES
PT Re-Evaluation     Today's date: 2021  Patient name: Armin Peñaloza  : 1935  MRN: [id number]  Referring provider: Fay Garvin MD  Dx:   Encounter Diagnosis     ICD-10-CM    1  S/P TKR (total knee replacement), right  Z96 651    2  Chronic pain of right knee  M25 561     G89 29    3  Primary osteoarthritis of right knee  M17 11                   Assessment  Assessment details: Pt is a pleasant 82 y o  male presenting to outpatient physical therapy s/p right TKR on 21  He presents with improvement with increased range of motion and strength as well as improved gait mechanics, though gait abnormalities persist with use SPC  Patient continues to have decreased activity tolerance and decreased function  Patient would benefit from continued physical therapy in order to address said deficits and maximize functional mobility  Impairments: abnormal coordination, abnormal gait, abnormal or restricted ROM, abnormal movement, activity intolerance, impaired balance, impaired physical strength, lacks appropriate home exercise program, pain with function, weight-bearing intolerance and poor body mechanics  Understanding of Dx/Px/POC: good   Prognosis: good    Goals  ST  Patient will decrease pain 1 grade -met  2  Patient will increase knee ROM to at least 90 degrees flexion  - met  3  Pt will be able to don shoe on right LE - progressing   4  Patient will demonstrate 1/2 grade improvement in strength in 4 weeks  - met    LT  Patient will be able to perform IADLS without restriction or pain by discharge  - progressing  2  Patient will be independent in HEP by discharge  3  Patient will be able to return to recreational duties without restriction or pain by discharge -not met   4  Normalize knee ROM to contralateral side-not met   5  Resume driving-not met   6  Pt will ambulate safely with LRAD -met  7  Increase FOTO to expected score-mostly met   8   Patient will be able to walk a mile at discharge  Plan  Patient would benefit from: PT eval and skilled PT  Planned modality interventions: cryotherapy and thermotherapy: hydrocollator packs  Planned therapy interventions: IADL retraining, body mechanics training, flexibility, functional ROM exercises, home exercise program, neuromuscular re-education, manual therapy, postural training, strengthening, stretching, therapeutic activities, therapeutic exercise and joint mobilization  Frequency: 2x week  Duration in weeks: 6  Treatment plan discussed with: patient        Subjective Evaluation    History of Present Illness  Date of surgery: 21  Mechanism of injury: Patient is s/p R TKR on 21 and ambulates with a SPC  Patient does not feel ready and comfortable to drive  Patient able to slip shoes on, but has wife help putting socks and tieing shoelaces  Patient still has difficulty walking long distances  Patient tried to sleep in bed but had to move to recliner in the middle of the night due to achyness  --------------------------------------------------------------------------------------------  Patient is s/ps R TKR on 21 he presents with RW with step to pattern  Is not driving currently  To be able to get into car and go, wants to be able to do yardwork    --------------------------------------------------------------------------------------------  Patient presents for pre-op evaluation for R knee pain, R TKA scheduled   Patient underwent L TKA in 2018  Patient has all assistive devices and home adjustments in place and is not concerned with home access post surgery  "Some pain off and on, sometimes it clicks sometimes it doesn't " Patient denies N/T, swelling, instability  Limited walking tolerance secondary to pain  No medication for pain, seated rest helps to relieve sxs     Pain  Current pain ratin  At worst pain ratin    Patient Goals  Patient goals for therapy: decreased pain, independence with ADLs/IADLs and return to sport/leisure activities  Patient goal: return to driving, return to yard work, walk without AD, return to showering normally, get socks and shoes on independently         Objective     Active Range of Motion   Left Knee   Flexion: 110 degrees   Extension: 0 degrees     Right Knee   Flexion: 93 degrees   Extension: -11 degrees     Passive Range of Motion   Left Knee   Flexion: 110 degrees   Extension: -10 degrees     Right Knee   Flexion: 105 degrees   Extension: -4 degrees     Additional Passive Range of Motion Details  5TSTS  : 21 31 sec with arm rests               : 17 73 sec w/ arm rest  TU 51 seconds with arm rests and RW            10 61 seconds w/ arm rest and no RW (SPC in hand but did not use)    Strength/Myotome Testing     Right Knee   Flexion: 4+  Extension: 5  Quadriceps contraction: good    Additional Strength Details  Hip flexion: 4/5   Ankle strength grossly WFL, (+) peripheral neuropathy         Precautions: R TKA 2021, L TKA 2018    Manuals    Patellar mobes              Knee stretching 12' 12'        15'                             Neuro Re-Ed             Quad sets         15x5"     steamboats             SLS             [facility] at [facility] [address] 5' 5'       5' 5'   TB side steps  RTB 2 laps        RTB x2 laps RTB 2 laps   Heel slides with strap 15x5" 15x5"       15x5"    glute bridge              Clamshells              SLR flexion 2x10 NV 2#        2x10 2x10    Leg Press  #90 3x10           Knee ext mach 33# x25 33# 3x10       Knee ext mach 22# 2x10 33# 2x10   Standing ham curl 33# x25 33# 3x10       Ham curl mach 22#  2x10 33# 2x10   marches 3# 20 ea DC       2 5# 20 ea 3# 20 ea   Hip abd standing  3# 20 R DC       2 5# 20 R 3# 20 R   Hip ext standing 3# 20 R DC       2 5# 20 R 3# 20 R   Heel/toe raises              Ther Activity             Step up and over  6" up and over x15 NV 6" up only 2x10   Gait tx with RW (heel-toe)  NV                                                  Modalities             CP PRN 8'        8' 8'                  Patient treated by Nena Weber, SPT under my supervision    Telma Sanford, PT,DPT, ATC

## 2021-07-30 ENCOUNTER — OFFICE VISIT (OUTPATIENT)
Dept: OBGYN CLINIC | Facility: MEDICAL CENTER | Age: 86
End: 2021-07-30

## 2021-07-30 VITALS
WEIGHT: 284 LBS | HEIGHT: 72 IN | SYSTOLIC BLOOD PRESSURE: 125 MMHG | BODY MASS INDEX: 38.47 KG/M2 | HEART RATE: 58 BPM | DIASTOLIC BLOOD PRESSURE: 77 MMHG

## 2021-07-30 DIAGNOSIS — Z96.651 S/P TKR (TOTAL KNEE REPLACEMENT), RIGHT: Primary | ICD-10-CM

## 2021-07-30 PROCEDURE — 99024 POSTOP FOLLOW-UP VISIT: CPT | Performed by: ORTHOPAEDIC SURGERY

## 2021-07-30 RX ORDER — CEPHALEXIN 500 MG/1
CAPSULE ORAL
Qty: 40 CAPSULE | Refills: 0 | Status: SHIPPED | OUTPATIENT
Start: 2021-07-30 | End: 2021-07-31

## 2021-07-30 NOTE — PROGRESS NOTES
Assessment:  1  S/P TKR (total knee replacement), right  cephalexin (KEFLEX) 500 mg capsule       Plan:  The patient is doing well  He should continue physical therapy  The patient is counseled on prophylactic antibiotic use prior to dental visits  Antibiotics are prescribed  He should follow up in 6 weeks with x-ray  To do next visit:  Return in about 6 weeks (around 9/10/2021)  The above stated was discussed in layman's terms and the patient expressed understanding  All questions were answered to the patient's satisfaction  Scribe Attestation    I,:  Miriam Schumacher am acting as a scribe while in the presence of the attending physician :       I,:  Dillon Chan MD personally performed the services described in this documentation    as scribed in my presence :             Subjective:   Juno Swan is a 82 y o  male who presents 6 weeks s/p right TKA, 6/16/2021  He is doing well  Today he complains of mild low level right knee discomfort  He does participate in physical therapy with benefit  He does use Tylenol for pain  He stopped using oxycodone  He does use a cane for ambulation  He denies fever, chills or shortness of breath  Review of systems negative unless otherwise specified in HPI    Past Medical History:   Diagnosis Date    Hypertension     Sleep apnea     no CPAP        Past Surgical History:   Procedure Laterality Date    CARPAL TUNNEL RELEASE      COLONOSCOPY      FL ARTHROCENTESIS ASPIR&/INJ MAJOR JT/BURSA W/O US  6/14/2018    Procedure: ASPIRATION RIGHT KNEE BURSA;   Surgeon: Dillon Chan MD;  [facility];  Service: Orthopedics    FL TOTAL KNEE ARTHROPLASTY Left 6/14/2018    Procedure: ARTHROPLASTY KNEE TOTAL;  Surgeon: Dillon Chan MD;  [facility];  Service: Orthopedics    FL TOTAL KNEE ARTHROPLASTY Right 6/16/2021    Procedure: ARTHROPLASTY KNEE TOTAL;  Surgeon: Dillon Chan MD;  [facility];  Service: Orthopedics Family History   Problem Relation Age of Onset    No Known Problems Mother     No Known Problems Father        Social History     Occupational History    Not on file   Tobacco Use    Smoking status: Former Smoker    Smokeless tobacco: Never Used    Tobacco comment: quit 50 years ago    Vaping Use    Vaping Use: Never used   Substance and Sexual Activity    Alcohol use: Not Currently    Drug use: No    Sexual activity: Not on file         Current Outpatient Medications:     aspirin 81 mg chewable tablet, Chew daily , Disp: , Rfl:     docusate sodium (COLACE) 100 mg capsule, Take 1 capsule (100 mg total) by mouth 2 (two) times a day, Disp: 10 capsule, Rfl: 0    enoxaparin (LOVENOX) 40 mg/0 4 mL, Inject 0 4 mL (40 mg total) under the skin daily, Disp: 28 mL, Rfl: 0    lisinopril-hydrochlorothiazide (PRINZIDE,ZESTORETIC) 20-25 MG per tablet, Take 1 tablet by mouth daily, Disp: , Rfl:     LUMIGAN 0 01 % ophthalmic drops, Administer into the left eye daily at bedtime , Disp: , Rfl:     oxyCODONE (ROXICODONE) 5 mg immediate release tablet, 1 pill po Q6 Hrs prn severe pain, Disp: 20 tablet, Rfl: 0    No Known Allergies         Vitals:    07/30/21 1524   BP: 125/77   Pulse: 58       Objective:  Physical exam  · General: Awake, Alert, Oriented  · Eyes: Pupils equal, round and reactive to light  · Heart: regular rate and rhythm  · Lungs: No audible wheezing  · Abdomen: soft                    Ortho Exam  Right knee:  Well healed anterior incision  No erythema and no ecchymosis  Appropriate swelling of lower limb  Appropriate warmth of knee  Extensor mechanism intact  Extension full  Flexion 95  Calf compartments soft and supple  Sensation intact  Toes are warm sensate and mobile      Diagnostics, reviewed and taken today if performed as documented:    None performed    Procedures, if performed today:    Procedures    None performed      Portions of the record may have been created with voice recognition software  Occasional wrong word or "sound a like" substitutions may have occurred due to the inherent limitations of voice recognition software  Read the chart carefully and recognize, using context, where substitutions have occurred

## 2021-08-02 ENCOUNTER — OFFICE VISIT (OUTPATIENT)
Dept: PHYSICAL THERAPY | Facility: CLINIC | Age: 86
End: 2021-08-02
Payer: COMMERCIAL

## 2021-08-02 DIAGNOSIS — M17.11 PRIMARY OSTEOARTHRITIS OF RIGHT KNEE: ICD-10-CM

## 2021-08-02 DIAGNOSIS — G89.29 CHRONIC PAIN OF RIGHT KNEE: ICD-10-CM

## 2021-08-02 DIAGNOSIS — Z96.651 S/P TKR (TOTAL KNEE REPLACEMENT), RIGHT: Primary | ICD-10-CM

## 2021-08-02 DIAGNOSIS — M25.561 CHRONIC PAIN OF RIGHT KNEE: ICD-10-CM

## 2021-08-02 PROCEDURE — 97110 THERAPEUTIC EXERCISES: CPT

## 2021-08-02 PROCEDURE — 97140 MANUAL THERAPY 1/> REGIONS: CPT

## 2021-08-02 PROCEDURE — 97112 NEUROMUSCULAR REEDUCATION: CPT

## 2021-08-02 NOTE — PROGRESS NOTES
Daily Note     Today's date: 2021  Patient name: Kate Sanabria  : 1935  MRN: [id number]  Referring provider: Denisse Hoang MD  Dx:   Encounter Diagnosis     ICD-10-CM    1  S/P TKR (total knee replacement), right  Z96 651    2  Chronic pain of right knee  M25 561     G89 29    3  Primary osteoarthritis of right knee  M17 11                   Subjective: Pt c/o increased knee soreness after walking around [employer] yesterday  Objective: See treatment diary below      Assessment: Tolerated treatment fair  Patient would benefit from continued PT  Tolerates TE performed well; most challenged by resisted SLR  Increased discomfort noted w/ manual stretching; flexion limited by swelling  Encouraged pt to elevate and ice more frequently  Plan: Progress treatment as tolerated         Precautions: R TKA 2021, L TKA 2018    Manuals    Patellar mobes              Knee stretching 12' 12' 12'       15'                             Neuro Re-Ed             Quad sets         15x5"     steamboats             SLS             [facility] 5' 5' 5'      5' 5'   TB side steps  RTB 2 laps        RTB x2 laps RTB 2 laps   Heel slides with strap 15x5" 15x5" 15x5"      15x5"    glute bridge              Clamshells              SLR flexion 2x10 NV 2#  2# 2x10      2x10 2x10    Leg Press  #90 3x10 90# 3x10          Knee ext mach 33# x25 33# 3x10 33# 3x10      Knee ext mach 22# 2x10 33# 2x10   Standing ham curl 33# x25 33# 3x10 33# 3x10      Ham curl mach 22#  2x10 33# 2x10   marches 3# 20 ea DC       2 5# 20 ea 3# 20 ea   Hip abd standing  3# 20 R DC       2 5# 20 R 3# 20 R   Hip ext standing 3# 20 R DC       2 5# 20 R 3# 20 R   Heel/toe raises              Ther Activity             Step up and over  6" up and over x15 NV 6" up and over x15       6" up only 2x10   Gait tx with RW (heel-toe)  NV Modalities             CP PRN 8'  8'      8' 8'

## 2021-08-05 ENCOUNTER — OFFICE VISIT (OUTPATIENT)
Dept: PHYSICAL THERAPY | Facility: CLINIC | Age: 86
End: 2021-08-05
Payer: COMMERCIAL

## 2021-08-05 DIAGNOSIS — M17.11 PRIMARY OSTEOARTHRITIS OF RIGHT KNEE: ICD-10-CM

## 2021-08-05 DIAGNOSIS — M25.561 CHRONIC PAIN OF RIGHT KNEE: ICD-10-CM

## 2021-08-05 DIAGNOSIS — G89.29 CHRONIC PAIN OF RIGHT KNEE: ICD-10-CM

## 2021-08-05 DIAGNOSIS — Z96.651 S/P TKR (TOTAL KNEE REPLACEMENT), RIGHT: Primary | ICD-10-CM

## 2021-08-05 PROCEDURE — 97110 THERAPEUTIC EXERCISES: CPT | Performed by: PHYSICAL THERAPIST

## 2021-08-05 PROCEDURE — 97010 HOT OR COLD PACKS THERAPY: CPT | Performed by: PHYSICAL THERAPIST

## 2021-08-05 PROCEDURE — 97140 MANUAL THERAPY 1/> REGIONS: CPT | Performed by: PHYSICAL THERAPIST

## 2021-08-05 NOTE — PROGRESS NOTES
Daily Note     Today's date: 2021  Patient name: Zoila Stevens  : 1935  MRN: [id number]  Referring provider: Miriam Acuna MD  Dx:   Encounter Diagnosis     ICD-10-CM    1  S/P TKR (total knee replacement), right  Z96 651    2  Chronic pain of right knee  M25 561     G89 29    3  Primary osteoarthritis of right knee  M17 11                   Subjective: Patient felt like his knee was stiff and sore after last PT session  Patient still sleeping in the recliner, due to discomfort in bed  Patient will attempt to sleep in bed again later on this week  Objective: See treatment diary below      Assessment: Tolerated treatment well  Patient would benefit from continued PT  Tolerated progression to therapeutic exercises well  Increased discomfort noted w/ manual stretching into extension; flexion limited by swelling  Encouraged pt to elevate and ice more frequently  Plan: Progress treatment as tolerated         Precautions: R TKA 2021, L TKA 2018    Manuals  8   Patellar mobes              Knee stretching 12' 12' 12' 12'      15'                             Neuro Re-Ed             Quad sets         15x5"     steamboats             SLS             [facility]' 5' 5' 5'     5' 5'   TB side steps  RTB 2 laps        RTB x2 laps RTB 2 laps   Heel slides with strap 15x5" 15x5" 15x5" 20x5"     15x5"    glute bridge              Clamshells              SLR flexion 2x10 NV 2#  2# 2x10 2# 2x10     2x10 2x10    Leg Press  #90 3x10 90# 3x10 #100 3x10         Knee ext mach 33# x25 33# 3x10 33# 3x10 44# 2x10     Knee ext mach 22# 2x10 33# 2x10   Standing ham curl 33# x25 33# 3x10 33# 3x10 44# 3x10     Ham curl mach 22#  2x10 33# 2x10   marches 3# 20 ea DC       2 5# 20 ea 3# 20 ea   Hip abd standing  3# 20 R DC       2 5# 20 R 3# 20 R   Hip ext standing 3# 20 R DC       2 5# 20 R 3# 20 R   Heel/toe raises     10x ea         Ther Activity             Step up and over  6" up and over x15 NV 6" up and over x15 6" up and over x15      6" up only 2x10   Gait tx with RW (heel-toe)  NV                                                  Modalities             CP PRN 8'  8' 8'     8' 8'                  Patient treated by Camila Murillo, SPT under my supervision    Aura Monday, PT,DPT, ATC

## 2021-08-09 ENCOUNTER — OFFICE VISIT (OUTPATIENT)
Dept: PHYSICAL THERAPY | Facility: CLINIC | Age: 86
End: 2021-08-09
Payer: COMMERCIAL

## 2021-08-09 DIAGNOSIS — G89.29 CHRONIC PAIN OF RIGHT KNEE: ICD-10-CM

## 2021-08-09 DIAGNOSIS — M25.561 CHRONIC PAIN OF RIGHT KNEE: ICD-10-CM

## 2021-08-09 DIAGNOSIS — Z96.651 S/P TKR (TOTAL KNEE REPLACEMENT), RIGHT: Primary | ICD-10-CM

## 2021-08-09 DIAGNOSIS — M17.11 PRIMARY OSTEOARTHRITIS OF RIGHT KNEE: ICD-10-CM

## 2021-08-09 PROCEDURE — 97112 NEUROMUSCULAR REEDUCATION: CPT

## 2021-08-09 PROCEDURE — 97110 THERAPEUTIC EXERCISES: CPT

## 2021-08-09 PROCEDURE — 97140 MANUAL THERAPY 1/> REGIONS: CPT

## 2021-08-09 NOTE — PROGRESS NOTES
Daily Note     Today's date: 2021  Patient name: Kate Sanabria  : 1935  MRN: [id number]  Referring provider: Denisse Hoang MD  Dx:   Encounter Diagnosis     ICD-10-CM    1  S/P TKR (total knee replacement), right  Z96 651    2  Chronic pain of right knee  M25 561     G89 29    3  Primary osteoarthritis of right knee  M17 11                   Subjective: Pt states he tends to "forget about his cane alot" lately  Objective: See treatment diary below      Assessment: Tolerated treatment well  Patient would benefit from continued PT   Placed cuff wt above knee due to abrasion on lower shin to avoid irritation; no pus, drainage noted  Also held on knee extension machine due to location of abrasion (resume nv as jean)  ROM continues to be limited by swelling the knee  Mild discomfort noted w/ SLS  Plan: Progress treatment as tolerated         Precautions: R TKA 2021, L TKA     Manuals    Patellar mobes              Knee stretching 12' 12' 12' 12' 12'     15'                             Neuro Re-Ed             Quad sets         15x5"     steamboats             SLS     10"x5 R        [facility] 5' 5' 5' 5' 5'    5' 5'   TB side steps  RTB 2 laps        RTB x2 laps RTB 2 laps   Heel slides with strap 15x5" 15x5" 15x5" 20x5" 20x5"    15x5"    glute bridge              Clamshells              SLR flexion 2x10 NV 2#  2# 2x10 2# 2x10 3# (above knee) 2x10    2x10 2x10    Leg Press  #90 3x10 90# 3x10 #100 3x10 100# 3x10        Knee ext mach 33# x25 33# 3x10 33# 3x10 44# 2x10 NV    Knee ext mach 22# 2x10 33# 2x10   Standing ham curl 33# x25 33# 3x10 33# 3x10 44# 3x10 44# 3x10    Ham curl mach 22#  2x10 33# 2x10   marches 3# 20 ea DC       2 5# 20 ea 3# 20 ea   Hip abd standing  3# 20 R DC       2 5# 20 R 3# 20 R   Hip ext standing 3# 20 R DC       2 5# 20 R 3# 20 R   Heel/toe raises 10x ea 20 ea        Ther Activity             Step up and over  6" up and over x15 NV 6" up and over x15 6" up and over x15 6" up and over x15     6" up only 2x10   Gait tx with RW (heel-toe)  NV                                                  Modalities             CP PRN 8'  8' 8' 8'    8' 8'

## 2021-08-12 ENCOUNTER — OFFICE VISIT (OUTPATIENT)
Dept: PHYSICAL THERAPY | Facility: CLINIC | Age: 86
End: 2021-08-12
Payer: COMMERCIAL

## 2021-08-12 DIAGNOSIS — Z96.651 S/P TKR (TOTAL KNEE REPLACEMENT), RIGHT: Primary | ICD-10-CM

## 2021-08-12 DIAGNOSIS — G89.29 CHRONIC PAIN OF RIGHT KNEE: ICD-10-CM

## 2021-08-12 DIAGNOSIS — M25.561 CHRONIC PAIN OF RIGHT KNEE: ICD-10-CM

## 2021-08-12 DIAGNOSIS — M17.11 PRIMARY OSTEOARTHRITIS OF RIGHT KNEE: ICD-10-CM

## 2021-08-12 PROCEDURE — 97112 NEUROMUSCULAR REEDUCATION: CPT | Performed by: PHYSICAL THERAPIST

## 2021-08-12 PROCEDURE — 97140 MANUAL THERAPY 1/> REGIONS: CPT | Performed by: PHYSICAL THERAPIST

## 2021-08-12 PROCEDURE — 97110 THERAPEUTIC EXERCISES: CPT | Performed by: PHYSICAL THERAPIST

## 2021-08-12 PROCEDURE — 97010 HOT OR COLD PACKS THERAPY: CPT | Performed by: PHYSICAL THERAPIST

## 2021-08-12 NOTE — PROGRESS NOTES
Daily Note     Today's date: 2021  Patient name: Kaitlynn Brothers  : 1935  MRN: [id number]  Referring provider: Michele Mtz MD  Dx:   Encounter Diagnosis     ICD-10-CM    1  S/P TKR (total knee replacement), right  Z96 651    2  Chronic pain of right knee  M25 561     G89 29    3  Primary osteoarthritis of right knee  M17 11                   Subjective: Patient reports no pain/swelling, only stiffness at times  Occasionally ambulates with a cane  Patient still continues to sleep in the recliner  Patient started driving again  Objective: See treatment diary below      Assessment: Tolerated treatment well  Patient would benefit from continued PT   Patient tolerated progression to therapeutic exercises and [facility]  ROM continues to be limited by swelling the knee  Mild discomfort noted w/ SLS  Plan: Progress treatment as tolerated         Precautions: R TKA 2021, L TKA 2018    Manuals  8 8   Patellar mobes              Knee stretching 12' 12' 12' 12' 12' 12' (proneflex)    15'                             Neuro Re-Ed             Quad sets         15x5"     steamboats             SLS     10"x5 R 3x20" R       TKE at [facility] #15                                              Ther Ex             Bike AAROM 5' 5' 5' 5' 5' 6'   5' 5'   TB side steps  RTB 2 laps        RTB x2 laps RTB 2 laps   Heel slides with strap 15x5" 15x5" 15x5" 20x5" 20x5" 20x5"   15x5"    glute bridge              Clamshells              SLR flexion 2x10 NV 2#  2# 2x10 2# 2x10 3# (above knee) 2x10    2x10 2x10    Leg Press  #90 3x10 90# 3x10 #100 3x10 100# 3x10 100# 3x10       Knee ext mach 33# x25 33# 3x10 33# 3x10 44# 2x10 NV 55# 2x10   Knee ext mach 22# 2x10 33# 2x10   Standing ham curl 33# x25 33# 3x10 33# 3x10 44# 3x10 44# 3x10 55# 3x10   Ham curl mach 22#  2x10 33# 2x10   marches 3# 20 ea DC       2 5# 20 ea 3# 20 ea   Hip abd standing  3# 20 R DC       2 5# 20 R 3# 20 R   Hip ext standing 3# 20 R DC       2 5# 20 R 3# 20 R   Heel/toe raises     10x ea 20 ea x15 w/ 3 sec hold       Squats             Ther Activity             Step up and over  6" up and over x15 NV 6" up and over x15 6" up and over x15 6" up and over x15 8" up and over 2x10    6" up only 2x10   Gait tx with RW (heel-toe)  NV                                                  Modalities             CP PRN 8'  8' 8' 8' 8'   8' 8'                      Patient treated by Bobbi Renae, RABIA under my supervision    Kim Bell, PT,DPT, ATC

## 2021-08-16 ENCOUNTER — OFFICE VISIT (OUTPATIENT)
Dept: PHYSICAL THERAPY | Facility: CLINIC | Age: 86
End: 2021-08-16
Payer: COMMERCIAL

## 2021-08-16 DIAGNOSIS — M25.561 CHRONIC PAIN OF RIGHT KNEE: ICD-10-CM

## 2021-08-16 DIAGNOSIS — M17.11 PRIMARY OSTEOARTHRITIS OF RIGHT KNEE: ICD-10-CM

## 2021-08-16 DIAGNOSIS — Z96.651 S/P TKR (TOTAL KNEE REPLACEMENT), RIGHT: Primary | ICD-10-CM

## 2021-08-16 DIAGNOSIS — G89.29 CHRONIC PAIN OF RIGHT KNEE: ICD-10-CM

## 2021-08-16 PROCEDURE — 97110 THERAPEUTIC EXERCISES: CPT

## 2021-08-16 PROCEDURE — 97140 MANUAL THERAPY 1/> REGIONS: CPT

## 2021-08-16 PROCEDURE — 97112 NEUROMUSCULAR REEDUCATION: CPT

## 2021-08-16 NOTE — PROGRESS NOTES
Daily Note     Today's date: 2021  Patient name: Morenita Webb  : 1935  MRN: [id number]  Referring provider: Darrick Dawson MD  Dx:   Encounter Diagnosis     ICD-10-CM    1  S/P TKR (total knee replacement), right  Z96 651    2  Chronic pain of right knee  M25 561     G89 29    3  Primary osteoarthritis of right knee  M17 11                   Subjective: Pt c/o continued stiffness/swelling in the knee  Again advised pt to prop leg when icing and suggested wearing compression stockings  Pt denies knee/calf pain  Objective: See treatment diary below      Assessment: Tolerated treatment well  Patient would benefit from continued PT  Difficulty negotiating 8" step, joel descending due to decreased knee flexion; needs cues for posture  Mild increase in discomfort noted w/ manual stretching  No adverse effects noted w/ LE strengthening  R calf measures, 41 5 cm; L 39 cm  Plan: Progress treatment as tolerated         Precautions: R TKA 2021, L TKA 2018    Manuals    Patellar mobes              Knee stretching 12' 12' 12' 12' 12' 12' (proneflex) 12'   15'                             Neuro Re-Ed             Quad sets              steamboats             SLS     10"x5 R 3x20" R 3x20" R      TKE at [facility] #15 15# x20                                             Ther Ex             Bike AAROM 5' 5' 5' 5' 5' 6' 6'  5' 5'   TB side steps  RTB 2 laps        RTB x2 laps RTB 2 laps   Heel slides with strap 15x5" 15x5" 15x5" 20x5" 20x5" 20x5" 20x5"  15x5"    glute bridge              Clamshells              SLR flexion 2x10 NV 2#  2# 2x10 2# 2x10 3# (above knee) 2x10    2x10 2x10    Leg Press  #90 3x10 90# 3x10 #100 3x10 100# 3x10 100# 3x10 100# 3x10      Knee ext mach 33# x25 33# 3x10 33# 3x10 44# 2x10 NV 55# 2x10 55# 2x10  Knee ext mach 22# 2x10 33# 2x10   Standing ham curl 33# x25 33# 3x10 33# 3x10 44# 3x10 44# 3x10 55# 3x10 55# 3x10  Ham curl mach 22#  2x10 33# 2x10   marches 3# 20 ea DC       2 5# 20 ea 3# 20 ea   Hip abd standing  3# 20 R DC       2 5# 20 R 3# 20 R   Hip ext standing 3# 20 R DC       2 5# 20 R 3# 20 R   Heel/toe raises     10x ea 20 ea x15 w/ 3 sec hold 15 x 3 sec hold      Squats             Ther Activity             Step up and over  6" up and over x15 NV 6" up and over x15 6" up and over x15 6" up and over x15 8" up and over 2x10 8" up and over  x10   6" up only 2x10   Gait tx with RW (heel-toe)  NV                                                  Modalities             CP PRN 8'  8' 8' 8' 8' 8'  8' 8'

## 2021-08-19 ENCOUNTER — OFFICE VISIT (OUTPATIENT)
Dept: PHYSICAL THERAPY | Facility: CLINIC | Age: 86
End: 2021-08-19
Payer: COMMERCIAL

## 2021-08-19 DIAGNOSIS — Z96.651 S/P TKR (TOTAL KNEE REPLACEMENT), RIGHT: Primary | ICD-10-CM

## 2021-08-19 DIAGNOSIS — G89.29 CHRONIC PAIN OF RIGHT KNEE: ICD-10-CM

## 2021-08-19 DIAGNOSIS — M17.11 PRIMARY OSTEOARTHRITIS OF RIGHT KNEE: ICD-10-CM

## 2021-08-19 DIAGNOSIS — M25.561 CHRONIC PAIN OF RIGHT KNEE: ICD-10-CM

## 2021-08-19 PROCEDURE — 97110 THERAPEUTIC EXERCISES: CPT

## 2021-08-19 PROCEDURE — 97112 NEUROMUSCULAR REEDUCATION: CPT

## 2021-08-19 PROCEDURE — 97140 MANUAL THERAPY 1/> REGIONS: CPT

## 2021-08-19 NOTE — PROGRESS NOTES
Daily Note     Today's date: 2021  Patient name: Courtney Kunz  : 1935  MRN: [id number]  Referring provider: Cheri Armijo MD  Dx:   Encounter Diagnosis     ICD-10-CM    1  S/P TKR (total knee replacement), right  Z96 651    2  Chronic pain of right knee  M25 561     G89 29    3  Primary osteoarthritis of right knee  M17 11                   Subjective: Pt c/o knee stiffness today due to sitting at the computer too long  Pt also reports occasional swelling in the foot, and is concerned he could be developing gout; advised pt to contact MD for further assessment  Objective: See treatment diary below      Assessment: Tolerated treatment well  Patient would benefit from continued PT  Mild discomfort noted w/ manual stretching  Continues to need cues when descending stairs for safety; pt tends to turn body before both feet are on floor and exhibits forward lean  Plan: Progress treatment as tolerated         Precautions: R TKA 2021, L TKA     Manuals    Patellar mobes              Knee stretching 12' 12' 12' 12' 12' 12' (proneflex) 12' 12'  15'                             Neuro Re-Ed             [facility] sets              steamboats             SLS     10"x5 R 3x20" R 3x20" R 3x20" R     TKE at [facility] #15 15# x20 15# x20                                            Ther Ex             Bike AAROM 5' 5' 5' 5' 5' 6' 6' 6'  5'   TB side steps  RTB 2 laps         RTB 2 laps   Heel slides with strap 15x5" 15x5" 15x5" 20x5" 20x5" 20x5" 20x5" 20x5"     glute bridge              Clamshells              SLR flexion 2x10 NV 2#  2# 2x10 2# 2x10 3# (above knee) 2x10     2x10    Leg Press  #90 3x10 90# 3x10 #100 3x10 100# 3x10 100# 3x10 100# 3x10 100# 3x10     Knee ext mach 33# x25 33# 3x10 33# 3x10 44# 2x10 NV 55# 2x10 55# 2x10 55# 2x10  33# 2x10   Standing ham curl 33# x25 33# 3x10 33# 3x10 44# 3x10 44# 3x10 55# 3x10 55# 3x10 55# 3x10  33# 2x10   ericka 3# 20 ea DC        3# 20 ea   Hip abd standing  3# 20 R DC        3# 20 R   Hip ext standing 3# 20 R DC        3# 20 R   Heel/toe raises     10x ea 20 ea x15 w/ 3 sec hold 15 x 3 sec hold 20x3" hold     Squats             Ther Activity             Step up and over  6" up and over x15 NV 6" up and over x15 6" up and over x15 6" up and over x15 8" up and over 2x10 8" up and over  x10 8" up and over x10  6" up only 2x10   Gait tx with RW (heel-toe)  NV                                                  Modalities             CP PRN 8'  8' 8' 8' 8' 8' 8'  8'

## 2021-08-23 ENCOUNTER — OFFICE VISIT (OUTPATIENT)
Dept: PHYSICAL THERAPY | Facility: CLINIC | Age: 86
End: 2021-08-23
Payer: COMMERCIAL

## 2021-08-23 DIAGNOSIS — M17.11 PRIMARY OSTEOARTHRITIS OF RIGHT KNEE: ICD-10-CM

## 2021-08-23 DIAGNOSIS — Z96.651 S/P TKR (TOTAL KNEE REPLACEMENT), RIGHT: Primary | ICD-10-CM

## 2021-08-23 DIAGNOSIS — G89.29 CHRONIC PAIN OF RIGHT KNEE: ICD-10-CM

## 2021-08-23 DIAGNOSIS — M25.561 CHRONIC PAIN OF RIGHT KNEE: ICD-10-CM

## 2021-08-23 PROCEDURE — 97110 THERAPEUTIC EXERCISES: CPT | Performed by: PHYSICAL THERAPIST

## 2021-08-23 PROCEDURE — 97140 MANUAL THERAPY 1/> REGIONS: CPT | Performed by: PHYSICAL THERAPIST

## 2021-08-23 PROCEDURE — 97112 NEUROMUSCULAR REEDUCATION: CPT | Performed by: PHYSICAL THERAPIST

## 2021-08-23 NOTE — PROGRESS NOTES
Daily Note     Today's date: 2021  Patient name: Girma Livingston  : 1935  MRN: [id number]  Referring provider: Jaki Skinner MD  Dx:   Encounter Diagnosis     ICD-10-CM    1  S/P TKR (total knee replacement), right  Z96 651    2  Chronic pain of right knee  M25 561     G89 29    3  Primary osteoarthritis of right knee  M17 11                   Subjective: Patient states "his knee has been feeling better and didn't bother him over the weekend"  Objective: See treatment diary below      Assessment: Tolerated treatment well  Patient would benefit from continued PT  Patient tolerated therapeutic exercises well with minimal cueing  Mild discomfort noted with manual stretching at end-range ROM in flex and ext  Plan: Progress treatment as tolerated         Precautions: R TKA 2021, L TKA 2018    Manuals    Patellar mobes             Knee stretching 12' 12' 12' 12' 12' 12' (proneflex) 12' 12' 12'                           Neuro Re-Ed            Quad sets             steamboats            SLS     10"x5 R 3x20" R 3x20" R 3x20" R 3x20" R yellow   TKE at [facility] #15 15# x20 15# x20 15# x20                                       Ther Ex            Bike AAROM 5' 5' 5' 5' 5' 6' 6' 6' 7' lv 3   TB side steps  RTB 2 laps           Heel slides with strap 15x5" 15x5" 15x5" 20x5" 20x5" 20x5" 20x5" 20x5" 20x5"   glute bridge             Clamshells             SLR flexion 2x10 NV 2#  2# 2x10 2# 2x10 3# (above knee) 2x10        Leg Press  #90 3x10 90# 3x10 #100 3x10 100# 3x10 100# 3x10 100# 3x10 100# 3x10 120# 2x10   Knee ext mach 33# x25 33# 3x10 33# 3x10 44# 2x10 NV 55# 2x10 55# 2x10 55# 2x10 55# 2x10   Standing ham curl 33# x25 33# 3x10 33# 3x10 44# 3x10 44# 3x10 55# 3x10 55# 3x10 55# 3x10 55# 2x10   marches 3# 20 ea DC          Hip abd standing  3# 20 R DC          Hip ext standing 3# 20 R DC          Heel/toe raises     10x ea 20 ea x15 w/ 3 sec hold 15 x 3 sec hold 20x3" hold NV   Squats            Ther Activity            Step up and over  6" up and over x15 NV 6" up and over x15 6" up and over x15 6" up and over x15 8" up and over 2x10 8" up and over  x10 8" up and over x10 8" up and over x10   Gait tx with RW (heel-toe)  NV          Bev retro walks         #25 x10                           Modalities            CP PRN 8'  8' 8' 8' 8' 8' 8' 8'                 Patient treated by Quinn Koroma, SPT under my supervision    Jose Antonio Leggett, PT,DPT, ATC

## 2021-08-25 NOTE — PROGRESS NOTES
Daily Note     Today's date: 2021  Patient name: Jaz Brown  : 1935  MRN: [id number]  Referring provider: Uche Bartholomew MD  Dx:   Encounter Diagnosis     ICD-10-CM    1  S/P TKR (total knee replacement), right  Z96 651    2  Chronic pain of right knee  M25 561     G89 29    3  Primary osteoarthritis of right knee  M17 11                   Subjective: Pt reports decreased swelling in the foot since starting medication PCP prescribed  Objective: See treatment diary below      Assessment: Tolerated treatment well  Patient would benefit from continued PT  Close supervision still recommended  for step up and overs as pt continues to move quickly through activity and turns before both feet are on the floor  SLS on foam challenging  Min discomfort noted w/ manual stretching  Plan: Progress treatment as tolerated         Precautions: R TKA 2021, L TKA 2018    Manuals    Patellar mobes             Knee stretching 12'       12' 12'                           Neuro Re-Ed            Quad sets             steamboats            SLS 3x20" R yellow       3x20" R 3x20" R yellow   TKE at [employer]  15# x20       15# x20 15# x20                                       Ther Ex            Bike AAROM 6' lv 3       6' 7' lv 3   TB side steps             Heel slides with strap NV       20x5" 20x5"   glute bridge             Clamshells             SLR flexion             Leg Press 100# 3x10       100# 3x10 120# 2x10   Knee ext mach 55# 3x10       55# 2x10 55# 2x10   Standing ham curl 55# 3x10       55# 3x10 55# 2x10   marches  DC          Hip abd standing   DC          Hip ext standing  DC          Heel/toe raises  20x3" hold       20x3" hold NV   Squats            Ther Activity            Step up and over  8" up and over x10       8" up and over x10 8" up and over x10   Gait tx with RW (heel-toe)            Bev retro walks 25# x10        #25 x10                           Modalities CP PRN 8'       8' 8'

## 2021-08-26 ENCOUNTER — OFFICE VISIT (OUTPATIENT)
Dept: PHYSICAL THERAPY | Facility: CLINIC | Age: 86
End: 2021-08-26
Payer: COMMERCIAL

## 2021-08-26 DIAGNOSIS — G89.29 CHRONIC PAIN OF RIGHT KNEE: ICD-10-CM

## 2021-08-26 DIAGNOSIS — M17.11 PRIMARY OSTEOARTHRITIS OF RIGHT KNEE: ICD-10-CM

## 2021-08-26 DIAGNOSIS — M25.561 CHRONIC PAIN OF RIGHT KNEE: ICD-10-CM

## 2021-08-26 DIAGNOSIS — Z96.651 S/P TKR (TOTAL KNEE REPLACEMENT), RIGHT: Primary | ICD-10-CM

## 2021-08-26 PROCEDURE — 97140 MANUAL THERAPY 1/> REGIONS: CPT

## 2021-08-26 PROCEDURE — 97110 THERAPEUTIC EXERCISES: CPT

## 2021-08-26 PROCEDURE — 97112 NEUROMUSCULAR REEDUCATION: CPT

## 2021-08-30 ENCOUNTER — OFFICE VISIT (OUTPATIENT)
Dept: PHYSICAL THERAPY | Facility: CLINIC | Age: 86
End: 2021-08-30
Payer: COMMERCIAL

## 2021-08-30 DIAGNOSIS — Z96.651 S/P TKR (TOTAL KNEE REPLACEMENT), RIGHT: Primary | ICD-10-CM

## 2021-08-30 DIAGNOSIS — M17.11 PRIMARY OSTEOARTHRITIS OF RIGHT KNEE: ICD-10-CM

## 2021-08-30 DIAGNOSIS — M25.561 CHRONIC PAIN OF RIGHT KNEE: ICD-10-CM

## 2021-08-30 DIAGNOSIS — G89.29 CHRONIC PAIN OF RIGHT KNEE: ICD-10-CM

## 2021-08-30 PROCEDURE — 97110 THERAPEUTIC EXERCISES: CPT

## 2021-08-30 PROCEDURE — 97140 MANUAL THERAPY 1/> REGIONS: CPT

## 2021-08-30 PROCEDURE — 97112 NEUROMUSCULAR REEDUCATION: CPT

## 2021-08-30 NOTE — PROGRESS NOTES
Daily Note     Today's date: 2021  Patient name: Juvencio Hernandez  : 1935  MRN: [id number]  Referring provider: Jihan Nance MD  Dx:   Encounter Diagnosis     ICD-10-CM    1  S/P TKR (total knee replacement), right  Z96 651    2  Chronic pain of right knee  M25 561     G89 29    3  Primary osteoarthritis of right knee  M17 11                   Subjective: Pt c/o increased medial knee discomfort after trying to sleep in bed for the first time (since surgery) the other night  Objective: See treatment diary below      Assessment: Tolerated treatment fair  Patient would benefit from continued PT  Most challenged by SLS on foam   Increased discomfort end range flexion and extension  Slight decrease in medial knee discomfort noted post tx  Plan: Progress treatment as tolerated  RE NV       Precautions: R TKA 2021, L TKA 2018    Manuals    Patellar mobes             Knee stretching 12' 12'      12' 12'                           Neuro Re-Ed            Quad sets             steamboats            SLS 3x20" R yellow 3x20" R yellow      3x20" R 3x20" R yellow   TKE at bev  15# x20 15# x20      15# x20 15# x20                                       Ther Ex            Bike AAROM 6' lv 3 6' lv 1      6' 7' lv 3   TB side steps             Heel slides with strap NV 15x5"      20x5" 20x5"   glute bridge             Clamshells             SLR flexion             Leg Press 100# 3x10 100#  3x10      100# 3x10 120# 2x10   Knee ext mach 55# 3x10 55# x25      55# 2x10 55# 2x10   Standing ham curl 55# 3x10 55# x25      55# 3x10 55# 2x10   marches  DC          Hip abd standing   DC          Hip ext standing  DC          Heel/toe raises  20x3" hold       20x3" hold NV   Squats            Ther Activity            Step up and over  8" up and over x10 8" up and over x10      8" up and over x10 8" up and over x10   Gait tx with RW (heel-toe)            Bev retro walks 25# x10 25# x10 #25 x10                           Modalities            CP PRN 8' 8'      8' 8'

## 2021-09-02 ENCOUNTER — EVALUATION (OUTPATIENT)
Dept: PHYSICAL THERAPY | Facility: CLINIC | Age: 86
End: 2021-09-02
Payer: COMMERCIAL

## 2021-09-02 DIAGNOSIS — M25.561 CHRONIC PAIN OF RIGHT KNEE: ICD-10-CM

## 2021-09-02 DIAGNOSIS — Z96.651 S/P TKR (TOTAL KNEE REPLACEMENT), RIGHT: Primary | ICD-10-CM

## 2021-09-02 DIAGNOSIS — M17.11 PRIMARY OSTEOARTHRITIS OF RIGHT KNEE: ICD-10-CM

## 2021-09-02 DIAGNOSIS — G89.29 CHRONIC PAIN OF RIGHT KNEE: ICD-10-CM

## 2021-09-02 PROCEDURE — 97110 THERAPEUTIC EXERCISES: CPT | Performed by: PHYSICAL THERAPIST

## 2021-09-02 PROCEDURE — 97112 NEUROMUSCULAR REEDUCATION: CPT | Performed by: PHYSICAL THERAPIST

## 2021-09-02 PROCEDURE — 97530 THERAPEUTIC ACTIVITIES: CPT | Performed by: PHYSICAL THERAPIST

## 2021-09-02 NOTE — PROGRESS NOTES
PT Re-Evaluation  and PT Discharge    Today's date: 2021  Patient name: Armin Peñaloza  : 1935  MRN: [id number]  Referring provider: Fay Garvin MD  Dx:   Encounter Diagnosis     ICD-10-CM    1  S/P TKR (total knee replacement), right  Z96 651    2  Chronic pain of right knee  M25 561     G89 29    3  Primary osteoarthritis of right knee  M17 11                   Assessment  Assessment details: Pt is a pleasant 82 y o  male presenting to outpatient physical therapy s/p right TKR on 21  He has demonstrated significant progression in knee ROM strength, and function since his last assessment  Patient has met functional goals and is safe to be DC at this time  Impairments: abnormal coordination, abnormal gait, abnormal or restricted ROM, abnormal movement, activity intolerance, impaired balance, impaired physical strength, lacks appropriate home exercise program, pain with function, weight-bearing intolerance and poor body mechanics  Understanding of Dx/Px/POC: good   Prognosis: good    Goals  ST  Patient will decrease pain 1 grade -met  2  Patient will increase knee ROM to at least 90 degrees flexion  - met  3  Pt will be able to don shoe on right LE - met  4  Patient will demonstrate 1/2 grade improvement in strength in 4 weeks  - met    LT  Patient will be able to perform IADLS without restriction or pain by discharge  - met  2  Patient will be independent in HEP by discharge -met  3  Patient will be able to return to recreational duties without restriction or pain by discharge -met   4  Normalize knee ROM to contralateral side-met   5  Resume driving-met   6  Pt will ambulate safely with LRAD -met  7  Increase FOTO to expected score-met   8   Patient will be able to walk a mile at discharge -met      Plan  Patient would benefit from: PT eval and skilled PT  Planned modality interventions: cryotherapy and thermotherapy: hydrocollator packs  Planned therapy interventions: IADL retraining, body mechanics training, flexibility, functional ROM exercises, home exercise program, neuromuscular re-education, manual therapy, postural training, strengthening, stretching, therapeutic activities, therapeutic exercise and joint mobilization  Treatment plan discussed with: patient        Subjective Evaluation    History of Present Illness  Date of surgery: 6/16/21  Mechanism of injury: Patient reports he is happy with progress  He sees MD next week and has been performing ADLs and driving without difficulty  He does have difficulty putting on his socks and reports his wife continues to help him  Mild strain on knee getting out of chair though is able to perform  ----------------------------------------------------------------------------------------  Patient is s/p R TKR on 6/16/21 and ambulates with a SPC  Patient does not feel ready and comfortable to drive  Patient able to slip shoes on, but has wife help putting socks and tieing shoelaces  Patient still has difficulty walking long distances  Patient tried to sleep in bed but had to move to recliner in the middle of the night due to achyness  --------------------------------------------------------------------------------------------  Patient is s/ps R TKR on 6/16/21 he presents with RW with step to pattern  Is not driving currently  To be able to get into car and go, wants to be able to do yardwork    --------------------------------------------------------------------------------------------  Patient presents for pre-op evaluation for R knee pain, R TKA scheduled 6/16  Patient underwent L TKA in 2018  Patient has all assistive devices and home adjustments in place and is not concerned with home access post surgery  "Some pain off and on, sometimes it clicks sometimes it doesn't " Patient denies N/T, swelling, instability  Limited walking tolerance secondary to pain  No medication for pain, seated rest helps to relieve sxs     Pain  Current pain ratin  At worst pain ratin    Patient Goals  Patient goals for therapy: decreased pain, independence with ADLs/IADLs and return to sport/leisure activities  Patient goal: return to driving (met), return to yard work (met), walk without AD (met) , return to showering normally, get socks and shoes on independently         Objective     Active Range of Motion   Left Knee   Flexion: 110 degrees   Extension: 0 degrees     Right Knee   Flexion: 108 degrees   Extension: -6 degrees     Passive Range of Motion   Left Knee   Flexion: 110 degrees   Extension: -10 degrees     Right Knee   Flexion: 105 degrees   Extension: -4 degrees     Additional Passive Range of Motion Details  5TSTS  : 21 31 sec with arm rests               : 17 73 sec w/ arm rest  TU 51 seconds with arm rests and RW            10 61 seconds w/ arm rest and no RW (SPC in hand but did not use)    Strength/Myotome Testing     Right Knee   Flexion: 5  Extension: 5  Quadriceps contraction: good    Additional Strength Details  Hip flexion: 5/5   Ankle strength grossly WFL, (+) peripheral neuropathy         Precautions: R TKA 2021, L TKA 2018    Manuals    Patellar mobes             Knee stretching 12' 12'      12' 12'                           Neuro Re-Ed            [employer]             steamboats            SLS 3x20" R yellow 3x20" R yellow      3x20" R 3x20" R yellow   TKE at kaia  15# x20 15# x20      15# x20 15# x20                                       Ther Ex            Bike AAROM 6' lv 3 6' lv 1 10' lvl 4      6' 7' lv 3   TB side steps             Heel slides with strap NV 15x5" 15x5"      20x5" 20x5"   glute bridge             Clamshells             SLR flexion             Leg Press 100# 3x10 100#  3x10 105# 3x10     100# 3x10 120# 2x10   Knee ext mach 55# 3x10 55# x25 55# 3x10     55# 2x10 55# 2x10   Standing ham curl 55# 3x10 55# x25 55# 3x10      55# 3x10 55# 2x10   marches  DC          Hip abd standing   DC          Hip ext standing  DC          Heel/toe raises  20x3" hold       20x3" hold NV   Squats            Ther Activity            Step up and over  8" up and over x10 8" up and over x10      8" up and over x10 8" up and over x10   Gait tx with RW (heel-toe)            Bev pierre walks 25# x10 25# x10 25# x14       #25 x10                           Modalities            CP PRN 8' 8'      8' 8'

## 2021-09-10 ENCOUNTER — OFFICE VISIT (OUTPATIENT)
Dept: OBGYN CLINIC | Facility: MEDICAL CENTER | Age: 86
End: 2021-09-10

## 2021-09-10 ENCOUNTER — APPOINTMENT (OUTPATIENT)
Dept: RADIOLOGY | Facility: MEDICAL CENTER | Age: 86
End: 2021-09-10
Payer: COMMERCIAL

## 2021-09-10 VITALS
DIASTOLIC BLOOD PRESSURE: 72 MMHG | BODY MASS INDEX: 39.2 KG/M2 | HEART RATE: 81 BPM | WEIGHT: 289.4 LBS | SYSTOLIC BLOOD PRESSURE: 118 MMHG | HEIGHT: 72 IN

## 2021-09-10 DIAGNOSIS — Z96.651 S/P TKR (TOTAL KNEE REPLACEMENT), RIGHT: ICD-10-CM

## 2021-09-10 DIAGNOSIS — Z96.651 S/P TKR (TOTAL KNEE REPLACEMENT), RIGHT: Primary | ICD-10-CM

## 2021-09-10 PROCEDURE — 99024 POSTOP FOLLOW-UP VISIT: CPT | Performed by: ORTHOPAEDIC SURGERY

## 2021-09-10 PROCEDURE — 73560 X-RAY EXAM OF KNEE 1 OR 2: CPT

## 2021-09-10 RX ORDER — CEPHALEXIN 500 MG/1
CAPSULE ORAL
Qty: 40 CAPSULE | Refills: 0 | Status: SHIPPED | OUTPATIENT
Start: 2021-09-10 | End: 2022-09-10

## 2021-09-10 NOTE — PROGRESS NOTES
Assessment:   Diagnosis ICD-10-CM Associated Orders   1  S/P TKR (total knee replacement), right  Z96 651 XR knee 1 or 2 vw right     cephalexin (KEFLEX) 500 mg capsule       Plan:    -  New x-rays of the right knee were obtained today and reviewed with the patient   - At this time patient should continue his home exercise program   - Explained to the patient that he will have the warmth in the thickening within the soft tissue envelope for about another 6 months  - Keflex 500 mg 40 tablets were sent to the patient's confirm pharmacy for dental cleanings or procedures  - Encouraged the patient to start his working up to his 1 mi walk    To do next visit:  Return in about 3 months (around 12/10/2021) for Right TKA  The above stated was discussed in layman's terms and the patient expressed understanding  All questions were answered to the patient's satisfaction  Scribe Attestation    I,:  Donal Chung am acting as a scribe while in the presence of the attending physician :       I,:  Priscilla Babinski, MD personally performed the services described in this documentation    as scribed in my presence :             Subjective:   Geovani Anderson is a 82 y o  male who presents today for 6 week follow-up for his right total knee arthroplasty  Patient is approximately 3 months status post right total knee arthroplasty performed on 06/16/2021  He has been in physical therapy working on his range of motion and strength  He continues to use Tylenol as needed for pain and discomfort  He states that he has some stiffness in the the knee  He has some warmth  Review of systems negative unless otherwise specified in HPI  Review of Systems   Constitutional: Negative for chills, fever and unexpected weight change  HENT: Negative for hearing loss, nosebleeds and sore throat  Eyes: Negative for pain, redness and visual disturbance  Respiratory: Negative for cough, shortness of breath and wheezing  Cardiovascular: Negative for chest pain, palpitations and leg swelling  Gastrointestinal: Negative for abdominal pain, nausea and vomiting  Endocrine: Negative for polydipsia and polyuria  Genitourinary: Negative for dysuria and hematuria  Skin: Negative for rash and wound  Neurological: Negative for dizziness, light-headedness and headaches  Psychiatric/Behavioral: Negative for decreased concentration, dysphoric mood and suicidal ideas  The patient is not nervous/anxious  Past Medical History:   Diagnosis Date    Hypertension     Sleep apnea     no CPAP        Past Surgical History:   Procedure Laterality Date    CARPAL TUNNEL RELEASE      COLONOSCOPY      CT ARTHROCENTESIS ASPIR&/INJ MAJOR JT/BURSA W/O US  6/14/2018    Procedure: ASPIRATION RIGHT KNEE BURSA;   Surgeon: Estuardo Ramon MD;  [facility];  Service: Orthopedics    CT TOTAL KNEE ARTHROPLASTY Left 6/14/2018    Procedure: ARTHROPLASTY KNEE TOTAL;  Surgeon: Estuardo Ramon MD;  [facility];  Service: Orthopedics    CT TOTAL KNEE ARTHROPLASTY Right 6/16/2021    Procedure: ARTHROPLASTY KNEE TOTAL;  Surgeon: Estuardo Ramon MD;  [facility];  Service: Orthopedics       Family History   Problem Relation Age of Onset    No Known Problems Mother     No Known Problems Father        Social History     Occupational History    Not on file   Tobacco Use    Smoking status: Former Smoker    Smokeless tobacco: Never Used    Tobacco comment: quit 50 years ago    Vaping Use    Vaping Use: Never used   Substance and Sexual Activity    Alcohol use: Not Currently    Drug use: No    Sexual activity: Not on file         Current Outpatient Medications:     aspirin 81 mg chewable tablet, Chew daily , Disp: , Rfl:     lisinopril-hydrochlorothiazide (PRINZIDE,ZESTORETIC) 20-25 MG per tablet, Take 1 tablet by mouth daily, Disp: , Rfl:     LUMIGAN 0 01 % ophthalmic drops, Administer into the left eye daily at bedtime , Disp: , Rfl:     docusate sodium (COLACE) 100 mg capsule, Take 1 capsule (100 mg total) by mouth 2 (two) times a day (Patient not taking: Reported on 9/10/2021), Disp: 10 capsule, Rfl: 0    enoxaparin (LOVENOX) 40 mg/0 4 mL, Inject 0 4 mL (40 mg total) under the skin daily (Patient not taking: Reported on 9/10/2021), Disp: 28 mL, Rfl: 0    oxyCODONE (ROXICODONE) 5 mg immediate release tablet, 1 pill po Q6 Hrs prn severe pain (Patient not taking: Reported on 9/10/2021), Disp: 20 tablet, Rfl: 0    No Known Allergies         Vitals:    09/10/21 1325   BP: 118/72   Pulse: 81       Objective:                    Right Knee Exam     Tenderness   The patient is experiencing no tenderness  Range of Motion   Extension: 0   Flexion: 120     Tests   Varus: negative Valgus: negative    Other   Erythema: absent  Sensation: normal  Pulse: present  Swelling: none  Effusion: no effusion present            Diagnostics, reviewed and taken today if performed as documented: The attending physician has personally reviewed the pertinent films in PACS and interpretation is as follows:   x-rays right knee:  Prosthesis in proper position with no signs of loosening    Procedures, if performed today:    Procedures    None performed      Portions of the record may have been created with voice recognition software  Occasional wrong word or "sound a like" substitutions may have occurred due to the inherent limitations of voice recognition software  Read the chart carefully and recognize, using context, where substitutions have occurred

## 2021-11-09 ENCOUNTER — OFFICE VISIT (OUTPATIENT)
Dept: UROLOGY | Facility: CLINIC | Age: 86
End: 2021-11-09
Payer: COMMERCIAL

## 2021-11-09 VITALS
WEIGHT: 292 LBS | BODY MASS INDEX: 39.55 KG/M2 | SYSTOLIC BLOOD PRESSURE: 120 MMHG | HEIGHT: 72 IN | DIASTOLIC BLOOD PRESSURE: 84 MMHG

## 2021-11-09 DIAGNOSIS — N40.1 BENIGN PROSTATIC HYPERPLASIA WITH LOWER URINARY TRACT SYMPTOMS, SYMPTOM DETAILS UNSPECIFIED: Primary | ICD-10-CM

## 2021-11-09 PROCEDURE — 99213 OFFICE O/P EST LOW 20 MIN: CPT | Performed by: PHYSICIAN ASSISTANT

## 2021-12-02 ENCOUNTER — TELEPHONE (OUTPATIENT)
Dept: NEPHROLOGY | Facility: CLINIC | Age: 86
End: 2021-12-02

## 2021-12-03 ENCOUNTER — APPOINTMENT (OUTPATIENT)
Dept: LAB | Facility: MEDICAL CENTER | Age: 86
End: 2021-12-03
Payer: COMMERCIAL

## 2021-12-03 DIAGNOSIS — N18.31 STAGE 3A CHRONIC KIDNEY DISEASE (HCC): ICD-10-CM

## 2021-12-03 DIAGNOSIS — I10 ESSENTIAL HYPERTENSION: ICD-10-CM

## 2021-12-03 LAB
ALBUMIN SERPL BCP-MCNC: 3.7 G/DL (ref 3.5–5)
ANION GAP SERPL CALCULATED.3IONS-SCNC: 7 MMOL/L (ref 4–13)
BASOPHILS # BLD AUTO: 0.07 THOUSANDS/ΜL (ref 0–0.1)
BASOPHILS NFR BLD AUTO: 2 % (ref 0–1)
BUN SERPL-MCNC: 20 MG/DL (ref 5–25)
CALCIUM SERPL-MCNC: 9.7 MG/DL (ref 8.3–10.1)
CHLORIDE SERPL-SCNC: 109 MMOL/L (ref 100–108)
CO2 SERPL-SCNC: 24 MMOL/L (ref 21–32)
CREAT SERPL-MCNC: 1.35 MG/DL (ref 0.6–1.3)
CREAT UR-MCNC: 139 MG/DL
EOSINOPHIL # BLD AUTO: 0.21 THOUSAND/ΜL (ref 0–0.61)
EOSINOPHIL NFR BLD AUTO: 5 % (ref 0–6)
ERYTHROCYTE [DISTWIDTH] IN BLOOD BY AUTOMATED COUNT: 13.3 % (ref 11.6–15.1)
GFR SERPL CREATININE-BSD FRML MDRD: 48 ML/MIN/1.73SQ M
GLUCOSE SERPL-MCNC: 103 MG/DL (ref 65–140)
HCT VFR BLD AUTO: 43.8 % (ref 36.5–49.3)
HGB BLD-MCNC: 14.7 G/DL (ref 12–17)
IMM GRANULOCYTES # BLD AUTO: 0.01 THOUSAND/UL (ref 0–0.2)
IMM GRANULOCYTES NFR BLD AUTO: 0 % (ref 0–2)
LYMPHOCYTES # BLD AUTO: 1.3 THOUSANDS/ΜL (ref 0.6–4.47)
LYMPHOCYTES NFR BLD AUTO: 28 % (ref 14–44)
MCH RBC QN AUTO: 32.7 PG (ref 26.8–34.3)
MCHC RBC AUTO-ENTMCNC: 33.6 G/DL (ref 31.4–37.4)
MCV RBC AUTO: 98 FL (ref 82–98)
MONOCYTES # BLD AUTO: 0.51 THOUSAND/ΜL (ref 0.17–1.22)
MONOCYTES NFR BLD AUTO: 11 % (ref 4–12)
NEUTROPHILS # BLD AUTO: 2.54 THOUSANDS/ΜL (ref 1.85–7.62)
NEUTS SEG NFR BLD AUTO: 54 % (ref 43–75)
NRBC BLD AUTO-RTO: 0 /100 WBCS
PHOSPHATE SERPL-MCNC: 2.9 MG/DL (ref 2.3–4.1)
PLATELET # BLD AUTO: 191 THOUSANDS/UL (ref 149–390)
PMV BLD AUTO: 10.3 FL (ref 8.9–12.7)
POTASSIUM SERPL-SCNC: 4.2 MMOL/L (ref 3.5–5.3)
PROT UR-MCNC: 10 MG/DL
PROT/CREAT UR: 0.07 MG/G{CREAT} (ref 0–0.1)
RBC # BLD AUTO: 4.49 MILLION/UL (ref 3.88–5.62)
SODIUM SERPL-SCNC: 140 MMOL/L (ref 136–145)
WBC # BLD AUTO: 4.64 THOUSAND/UL (ref 4.31–10.16)

## 2021-12-03 PROCEDURE — 82570 ASSAY OF URINE CREATININE: CPT

## 2021-12-03 PROCEDURE — 36415 COLL VENOUS BLD VENIPUNCTURE: CPT

## 2021-12-03 PROCEDURE — 84156 ASSAY OF PROTEIN URINE: CPT

## 2021-12-03 PROCEDURE — 85025 COMPLETE CBC W/AUTO DIFF WBC: CPT

## 2021-12-03 PROCEDURE — 80069 RENAL FUNCTION PANEL: CPT

## 2021-12-07 ENCOUNTER — OFFICE VISIT (OUTPATIENT)
Dept: NEPHROLOGY | Facility: CLINIC | Age: 86
End: 2021-12-07
Payer: COMMERCIAL

## 2021-12-07 VITALS
BODY MASS INDEX: 39.82 KG/M2 | DIASTOLIC BLOOD PRESSURE: 72 MMHG | WEIGHT: 294 LBS | SYSTOLIC BLOOD PRESSURE: 126 MMHG | HEIGHT: 72 IN

## 2021-12-07 DIAGNOSIS — Z96.651 S/P TOTAL KNEE ARTHROPLASTY, RIGHT: ICD-10-CM

## 2021-12-07 DIAGNOSIS — E66.01 CLASS 3 SEVERE OBESITY DUE TO EXCESS CALORIES WITHOUT SERIOUS COMORBIDITY WITH BODY MASS INDEX (BMI) OF 40.0 TO 44.9 IN ADULT (HCC): ICD-10-CM

## 2021-12-07 DIAGNOSIS — N18.31 STAGE 3A CHRONIC KIDNEY DISEASE (HCC): Primary | ICD-10-CM

## 2021-12-07 DIAGNOSIS — I10 ESSENTIAL HYPERTENSION: ICD-10-CM

## 2021-12-07 PROCEDURE — 99214 OFFICE O/P EST MOD 30 MIN: CPT | Performed by: INTERNAL MEDICINE

## 2021-12-10 ENCOUNTER — OFFICE VISIT (OUTPATIENT)
Dept: OBGYN CLINIC | Facility: MEDICAL CENTER | Age: 86
End: 2021-12-10
Payer: COMMERCIAL

## 2021-12-10 ENCOUNTER — APPOINTMENT (OUTPATIENT)
Dept: RADIOLOGY | Facility: MEDICAL CENTER | Age: 86
End: 2021-12-10
Payer: COMMERCIAL

## 2021-12-10 VITALS
HEART RATE: 91 BPM | HEIGHT: 72 IN | BODY MASS INDEX: 39.42 KG/M2 | WEIGHT: 291 LBS | SYSTOLIC BLOOD PRESSURE: 120 MMHG | DIASTOLIC BLOOD PRESSURE: 76 MMHG

## 2021-12-10 DIAGNOSIS — Z96.651 S/P TKR (TOTAL KNEE REPLACEMENT), RIGHT: ICD-10-CM

## 2021-12-10 DIAGNOSIS — Z96.651 S/P TKR (TOTAL KNEE REPLACEMENT), RIGHT: Primary | ICD-10-CM

## 2021-12-10 PROCEDURE — 73560 X-RAY EXAM OF KNEE 1 OR 2: CPT

## 2021-12-10 PROCEDURE — 99212 OFFICE O/P EST SF 10 MIN: CPT | Performed by: ORTHOPAEDIC SURGERY

## 2022-07-15 ENCOUNTER — APPOINTMENT (OUTPATIENT)
Dept: RADIOLOGY | Facility: MEDICAL CENTER | Age: 87
End: 2022-07-15
Payer: COMMERCIAL

## 2022-07-15 ENCOUNTER — OFFICE VISIT (OUTPATIENT)
Dept: OBGYN CLINIC | Facility: MEDICAL CENTER | Age: 87
End: 2022-07-15
Payer: COMMERCIAL

## 2022-07-15 VITALS
DIASTOLIC BLOOD PRESSURE: 68 MMHG | BODY MASS INDEX: 40.77 KG/M2 | HEART RATE: 73 BPM | SYSTOLIC BLOOD PRESSURE: 123 MMHG | HEIGHT: 72 IN | WEIGHT: 301 LBS

## 2022-07-15 DIAGNOSIS — Z96.651 S/P TOTAL KNEE ARTHROPLASTY, RIGHT: ICD-10-CM

## 2022-07-15 DIAGNOSIS — Z47.1 AFTERCARE FOLLOWING LEFT KNEE JOINT REPLACEMENT SURGERY: ICD-10-CM

## 2022-07-15 DIAGNOSIS — M17.11 PRIMARY OSTEOARTHRITIS OF RIGHT KNEE: ICD-10-CM

## 2022-07-15 DIAGNOSIS — Z96.652 AFTERCARE FOLLOWING LEFT KNEE JOINT REPLACEMENT SURGERY: ICD-10-CM

## 2022-07-15 DIAGNOSIS — M70.41 PREPATELLAR BURSITIS OF RIGHT KNEE: Primary | ICD-10-CM

## 2022-07-15 PROCEDURE — 73560 X-RAY EXAM OF KNEE 1 OR 2: CPT

## 2022-07-15 PROCEDURE — 99212 OFFICE O/P EST SF 10 MIN: CPT | Performed by: ORTHOPAEDIC SURGERY

## 2022-07-15 NOTE — PROGRESS NOTES
Assessment:   Diagnosis ICD-10-CM Associated Orders   1  Prepatellar bursitis of right knee  M70 41    2  Aftercare following left knee joint replacement surgery  Z47 1     Z96 652    3  Primary osteoarthritis of right knee  M17 11 XR knee 1 or 2 vw right       Plan:      To do next visit:  No follow-ups on file  The above stated was discussed in layman's terms and the patient expressed understanding  All questions were answered to the patient's satisfaction  Scribe Attestation    I,:   am acting as a scribe while in the presence of the attending physician :       I,:   personally performed the services described in this documentation    as scribed in my presence :             Subjective:   Steve Dacosta is a 82 y o  male who presents       Review of systems negative unless otherwise specified in HPI  Review of Systems    Past Medical History:   Diagnosis Date    Chronic kidney disease     Hypertension     Sleep apnea     no CPAP        Past Surgical History:   Procedure Laterality Date    CARPAL TUNNEL RELEASE      COLONOSCOPY      CO ARTHROCENTESIS ASPIR&/INJ MAJOR JT/BURSA W/O US  6/14/2018    Procedure: ASPIRATION RIGHT KNEE BURSA;   Surgeon: Lina Garay MD;  [facility];  Service: Orthopedics    CO TOTAL KNEE ARTHROPLASTY Left 6/14/2018    Procedure: ARTHROPLASTY KNEE TOTAL;  Surgeon: Lina Garay MD;  [facility];  Service: Orthopedics    CO TOTAL KNEE ARTHROPLASTY Right 6/16/2021    Procedure: ARTHROPLASTY KNEE TOTAL;  Surgeon: Lina Garay MD;  [facility];  Service: Orthopedics       Family History   Problem Relation Age of Onset    No Known Problems Mother     No Known Problems Father        Social History     Occupational History    Not on file   Tobacco Use    Smoking status: Former Smoker    Smokeless tobacco: Never Used    Tobacco comment: quit 50 years ago    Vaping Use    Vaping Use: Never used   Substance and Sexual Activity    Alcohol use: Not Currently    Drug use: No    Sexual activity: Not on file         Current Outpatient Medications:     aspirin 81 mg chewable tablet, Chew daily , Disp: , Rfl:     cephalexin (KEFLEX) 500 mg capsule, Take 4 tablets 1 hour prior to dental cleanings or procedures  , Disp: 40 capsule, Rfl: 0    lisinopril-hydrochlorothiazide (PRINZIDE,ZESTORETIC) 20-25 MG per tablet, Take 1 tablet by mouth daily, Disp: , Rfl:     LUMIGAN 0 01 % ophthalmic drops, Administer into the left eye daily at bedtime , Disp: , Rfl:     docusate sodium (COLACE) 100 mg capsule, Take 1 capsule (100 mg total) by mouth 2 (two) times a day (Patient not taking: No sig reported), Disp: 10 capsule, Rfl: 0    enoxaparin (LOVENOX) 40 mg/0 4 mL, Inject 0 4 mL (40 mg total) under the skin daily (Patient not taking: No sig reported), Disp: 28 mL, Rfl: 0    oxyCODONE (ROXICODONE) 5 mg immediate release tablet, 1 pill po Q6 Hrs prn severe pain (Patient not taking: No sig reported), Disp: 20 tablet, Rfl: 0    No Known Allergies         Vitals:    07/15/22 1258   BP: 123/68   Pulse: 73       Objective:                    Ortho Exam    Diagnostics, reviewed and taken today if performed as documented:    None performed      The attending physician has personally reviewed the pertinent films in PACS and interpretation is as follows:      Procedures, if performed today:    Procedures    None performed      Portions of the record may have been created with voice recognition software  Occasional wrong word or "sound a like" substitutions may have occurred due to the inherent limitations of voice recognition software  Read the chart carefully and recognize, using context, where substitutions have occurred

## 2022-07-15 NOTE — PROGRESS NOTES
Subjective;    26-year-old male, 1 year postop right total knee replacement,  And successful recipient of a left total knee approximately 3 years ago  He comes the office today accompanied by his wife for x-ray follow-up and clinical exam, ossific to the right knee  He enjoys freedom from pain in regards to knee range of motion but would like to walk distances better unfortunately hampered by neuropathy  Of previous attempt at administration of gabapentin with in significant success,   However it is unclear to me whether it was titrated into a therapeutic dosing amount  X-rays of the right knee were obtained at today's appointment    Past Medical History:   Diagnosis Date    Chronic kidney disease     Hypertension     Sleep apnea     no CPAP        Past Surgical History:   Procedure Laterality Date    CARPAL TUNNEL RELEASE      COLONOSCOPY      OR ARTHROCENTESIS ASPIR&/INJ MAJOR JT/BURSA W/O US  6/14/2018    Procedure: ASPIRATION RIGHT KNEE BURSA;   Surgeon: Chicho Walter MD;  [facility];  Service: Orthopedics    OR TOTAL KNEE ARTHROPLASTY Left 6/14/2018    Procedure: ARTHROPLASTY KNEE TOTAL;  Surgeon: Chicho Walter MD;  [facility];  Service: Orthopedics    OR TOTAL KNEE ARTHROPLASTY Right 6/16/2021    Procedure: ARTHROPLASTY KNEE TOTAL;  Surgeon: Chicho Walter MD;  [facility];  Service: Orthopedics       Family History   Problem Relation Age of Onset    No Known Problems Mother     No Known Problems Father        Social History     Tobacco Use    Smoking status: Former Smoker    Smokeless tobacco: Never Used    Tobacco comment: quit 50 years ago    Vaping Use    Vaping Use: Never used   Substance Use Topics    Alcohol use: Not Currently    Drug use: No     Exam;    Gentleman of proportion  Comfortable in the seated position  Range of motion right knee extension purposely full and flexion greater than 100° his left knee range of motion is slightly better in regards to flexion and hyperflexed  He has no redness no bruising overlying the right knee  He has a well-healed and mature vertical incision  X-rays;  right knee series, several views of the right knee show intact knee components no evidence of loosening    Impression;    1 year follow-up right total knee replacement  History left total knee replacement    Plan;    He is allowed to walk , and activity as desired  He is offered a 2 year follow-up visit next year with x-rays which he accepted  His entire experience was supervised by and plan formulated by the attending was my privilege to assist him in the delivery of this gentleman's care

## 2022-08-04 ENCOUNTER — TELEPHONE (OUTPATIENT)
Dept: NEPHROLOGY | Facility: CLINIC | Age: 87
End: 2022-08-04

## 2022-08-24 ENCOUNTER — APPOINTMENT (OUTPATIENT)
Dept: LAB | Facility: MEDICAL CENTER | Age: 87
End: 2022-08-24
Payer: COMMERCIAL

## 2022-08-24 DIAGNOSIS — I10 ESSENTIAL HYPERTENSION, BENIGN: ICD-10-CM

## 2022-08-24 DIAGNOSIS — R60.9 EDEMA, UNSPECIFIED TYPE: ICD-10-CM

## 2022-08-24 DIAGNOSIS — G47.33 OBSTRUCTIVE SLEEP APNEA: ICD-10-CM

## 2022-08-24 LAB
ALBUMIN SERPL BCP-MCNC: 3.6 G/DL (ref 3.5–5)
ALP SERPL-CCNC: 71 U/L (ref 46–116)
ALT SERPL W P-5'-P-CCNC: 22 U/L (ref 12–78)
ANION GAP SERPL CALCULATED.3IONS-SCNC: 5 MMOL/L (ref 4–13)
AST SERPL W P-5'-P-CCNC: 16 U/L (ref 5–45)
BASOPHILS # BLD AUTO: 0.08 THOUSANDS/ΜL (ref 0–0.1)
BASOPHILS NFR BLD AUTO: 2 % (ref 0–1)
BILIRUB SERPL-MCNC: 0.68 MG/DL (ref 0.2–1)
BUN SERPL-MCNC: 27 MG/DL (ref 5–25)
CALCIUM SERPL-MCNC: 9.8 MG/DL (ref 8.3–10.1)
CHLORIDE SERPL-SCNC: 109 MMOL/L (ref 96–108)
CHOLEST SERPL-MCNC: 136 MG/DL
CO2 SERPL-SCNC: 27 MMOL/L (ref 21–32)
CREAT SERPL-MCNC: 1.62 MG/DL (ref 0.6–1.3)
EOSINOPHIL # BLD AUTO: 0.17 THOUSAND/ΜL (ref 0–0.61)
EOSINOPHIL NFR BLD AUTO: 4 % (ref 0–6)
ERYTHROCYTE [DISTWIDTH] IN BLOOD BY AUTOMATED COUNT: 13.1 % (ref 11.6–15.1)
GFR SERPL CREATININE-BSD FRML MDRD: 38 ML/MIN/1.73SQ M
GLUCOSE P FAST SERPL-MCNC: 112 MG/DL (ref 65–99)
HCT VFR BLD AUTO: 44.7 % (ref 36.5–49.3)
HDLC SERPL-MCNC: 38 MG/DL
HGB BLD-MCNC: 14.5 G/DL (ref 12–17)
IMM GRANULOCYTES # BLD AUTO: 0.02 THOUSAND/UL (ref 0–0.2)
IMM GRANULOCYTES NFR BLD AUTO: 1 % (ref 0–2)
LDLC SERPL CALC-MCNC: 66 MG/DL (ref 0–100)
LYMPHOCYTES # BLD AUTO: 1.37 THOUSANDS/ΜL (ref 0.6–4.47)
LYMPHOCYTES NFR BLD AUTO: 31 % (ref 14–44)
MCH RBC QN AUTO: 33 PG (ref 26.8–34.3)
MCHC RBC AUTO-ENTMCNC: 32.4 G/DL (ref 31.4–37.4)
MCV RBC AUTO: 102 FL (ref 82–98)
MONOCYTES # BLD AUTO: 0.47 THOUSAND/ΜL (ref 0.17–1.22)
MONOCYTES NFR BLD AUTO: 11 % (ref 4–12)
NEUTROPHILS # BLD AUTO: 2.33 THOUSANDS/ΜL (ref 1.85–7.62)
NEUTS SEG NFR BLD AUTO: 51 % (ref 43–75)
NONHDLC SERPL-MCNC: 98 MG/DL
NRBC BLD AUTO-RTO: 0 /100 WBCS
PLATELET # BLD AUTO: 193 THOUSANDS/UL (ref 149–390)
PMV BLD AUTO: 10.3 FL (ref 8.9–12.7)
POTASSIUM SERPL-SCNC: 4.6 MMOL/L (ref 3.5–5.3)
PROT SERPL-MCNC: 7.4 G/DL (ref 6.4–8.4)
RBC # BLD AUTO: 4.4 MILLION/UL (ref 3.88–5.62)
SODIUM SERPL-SCNC: 141 MMOL/L (ref 135–147)
TRIGL SERPL-MCNC: 162 MG/DL
TSH SERPL DL<=0.05 MIU/L-ACNC: 1.2 UIU/ML (ref 0.45–4.5)
WBC # BLD AUTO: 4.44 THOUSAND/UL (ref 4.31–10.16)

## 2022-08-24 PROCEDURE — 80053 COMPREHEN METABOLIC PANEL: CPT

## 2022-08-24 PROCEDURE — 85025 COMPLETE CBC W/AUTO DIFF WBC: CPT

## 2022-08-24 PROCEDURE — 80061 LIPID PANEL: CPT

## 2022-08-24 PROCEDURE — 36415 COLL VENOUS BLD VENIPUNCTURE: CPT

## 2022-08-24 PROCEDURE — 84443 ASSAY THYROID STIM HORMONE: CPT

## 2022-08-25 ENCOUNTER — TELEPHONE (OUTPATIENT)
Dept: NEPHROLOGY | Facility: CLINIC | Age: 87
End: 2022-08-25

## 2022-08-25 NOTE — TELEPHONE ENCOUNTER
Reschedule Appointment   Person speaking to  Pearl Restrepo   Date of original appointment 12/27    New appointment date 10/19   Patient on dialysis no   Location [facility]    Provider Dr Jose Luis Brink

## 2022-10-14 ENCOUNTER — TELEPHONE (OUTPATIENT)
Dept: NEPHROLOGY | Facility: HOSPITAL | Age: 87
End: 2022-10-14

## 2022-10-14 ENCOUNTER — APPOINTMENT (OUTPATIENT)
Dept: LAB | Facility: MEDICAL CENTER | Age: 87
End: 2022-10-14
Payer: COMMERCIAL

## 2022-10-14 DIAGNOSIS — N18.31 STAGE 3A CHRONIC KIDNEY DISEASE (HCC): ICD-10-CM

## 2022-10-14 LAB
ALBUMIN SERPL BCP-MCNC: 3.8 G/DL (ref 3.5–5)
ANION GAP SERPL CALCULATED.3IONS-SCNC: 5 MMOL/L (ref 4–13)
BUN SERPL-MCNC: 29 MG/DL (ref 5–25)
CALCIUM SERPL-MCNC: 9.6 MG/DL (ref 8.3–10.1)
CHLORIDE SERPL-SCNC: 107 MMOL/L (ref 96–108)
CO2 SERPL-SCNC: 26 MMOL/L (ref 21–32)
CREAT SERPL-MCNC: 1.5 MG/DL (ref 0.6–1.3)
CREAT UR-MCNC: 72.9 MG/DL
ERYTHROCYTE [DISTWIDTH] IN BLOOD BY AUTOMATED COUNT: 12.8 % (ref 11.6–15.1)
GFR SERPL CREATININE-BSD FRML MDRD: 42 ML/MIN/1.73SQ M
GLUCOSE SERPL-MCNC: 117 MG/DL (ref 65–140)
HCT VFR BLD AUTO: 44.7 % (ref 36.5–49.3)
HGB BLD-MCNC: 14.5 G/DL (ref 12–17)
MCH RBC QN AUTO: 33.5 PG (ref 26.8–34.3)
MCHC RBC AUTO-ENTMCNC: 32.4 G/DL (ref 31.4–37.4)
MCV RBC AUTO: 103 FL (ref 82–98)
PHOSPHATE SERPL-MCNC: 3.5 MG/DL (ref 2.3–4.1)
PLATELET # BLD AUTO: 199 THOUSANDS/UL (ref 149–390)
PMV BLD AUTO: 10.1 FL (ref 8.9–12.7)
POTASSIUM SERPL-SCNC: 4.4 MMOL/L (ref 3.5–5.3)
PROT UR-MCNC: <6 MG/DL
PROT/CREAT UR: <0.08 MG/G{CREAT} (ref 0–0.1)
PTH-INTACT SERPL-MCNC: 98.3 PG/ML (ref 18.4–80.1)
RBC # BLD AUTO: 4.33 MILLION/UL (ref 3.88–5.62)
SODIUM SERPL-SCNC: 138 MMOL/L (ref 135–147)
WBC # BLD AUTO: 5.56 THOUSAND/UL (ref 4.31–10.16)

## 2022-10-14 PROCEDURE — 80069 RENAL FUNCTION PANEL: CPT

## 2022-10-14 PROCEDURE — 82570 ASSAY OF URINE CREATININE: CPT

## 2022-10-14 PROCEDURE — 85027 COMPLETE CBC AUTOMATED: CPT

## 2022-10-14 PROCEDURE — 83970 ASSAY OF PARATHORMONE: CPT

## 2022-10-14 PROCEDURE — 36415 COLL VENOUS BLD VENIPUNCTURE: CPT

## 2022-10-14 PROCEDURE — 84156 ASSAY OF PROTEIN URINE: CPT

## 2022-10-18 ENCOUNTER — TELEPHONE (OUTPATIENT)
Dept: NEPHROLOGY | Facility: CLINIC | Age: 87
End: 2022-10-18

## 2022-10-18 NOTE — TELEPHONE ENCOUNTER
Appointment Confirmation   Person confirmed appointment with  If not patient, name of the person Cristin piper wife     Date and time of appointment 10/19   Patient acknowledged and will be at appointment?  yes   Did you advise the patient that they will need a urine sample if they are a new patient? no   Did you advise the patient to bring their current medications for verification? (including any OTC) yes   Additional Information

## 2022-10-19 ENCOUNTER — OFFICE VISIT (OUTPATIENT)
Dept: NEPHROLOGY | Facility: CLINIC | Age: 87
End: 2022-10-19
Payer: COMMERCIAL

## 2022-10-19 VITALS
DIASTOLIC BLOOD PRESSURE: 66 MMHG | OXYGEN SATURATION: 95 % | BODY MASS INDEX: 40.5 KG/M2 | HEART RATE: 80 BPM | SYSTOLIC BLOOD PRESSURE: 116 MMHG | HEIGHT: 72 IN | WEIGHT: 299 LBS

## 2022-10-19 DIAGNOSIS — I10 ESSENTIAL HYPERTENSION: ICD-10-CM

## 2022-10-19 DIAGNOSIS — Z96.651 S/P TOTAL KNEE ARTHROPLASTY, RIGHT: ICD-10-CM

## 2022-10-19 DIAGNOSIS — E66.01 CLASS 3 SEVERE OBESITY DUE TO EXCESS CALORIES WITHOUT SERIOUS COMORBIDITY WITH BODY MASS INDEX (BMI) OF 40.0 TO 44.9 IN ADULT (HCC): ICD-10-CM

## 2022-10-19 DIAGNOSIS — N18.31 STAGE 3A CHRONIC KIDNEY DISEASE (HCC): Primary | ICD-10-CM

## 2022-10-19 PROCEDURE — 99213 OFFICE O/P EST LOW 20 MIN: CPT | Performed by: INTERNAL MEDICINE

## 2022-10-19 RX ORDER — CEPHALEXIN 500 MG/1
500 CAPSULE ORAL EVERY 6 HOURS SCHEDULED
COMMUNITY
End: 2022-10-29

## 2022-10-19 RX ORDER — GABAPENTIN 100 MG/1
100 CAPSULE ORAL 2 TIMES DAILY
COMMUNITY

## 2022-10-19 NOTE — PROGRESS NOTES
OFFICE FOLLOW UP - Nephrology   Steve Dacosta 82 y o  male MRN: [id number]       ASSESSMENT and PLAN:  Didier was seen today for follow-up and chronic kidney disease  Diagnoses and all orders for this visit:    Stage 3a chronic kidney disease ([facility] [address] )  -     PTH, intact; Future  -     Renal function panel; Future  -     Urinalysis with microscopic; Future  -     Protein / creatinine ratio, urine; Future    Class 3 severe obesity due to excess calories without serious comorbidity with body mass index (BMI) of 40 0 to 44 9 in adult [facility])    Essential hypertension    S/P total knee arthroplasty, right        This is an 53-year-old gentleman with history hypertension, obesity, osteoarthritis of the right knee, chronic kidney disease with creatinine fluctuating around 1 2-1 3 since 2018, who was initially seen for CKD evaluation prior to right knee arthroplasty 06/03/2021  Today returns to the office for CKD follow-up    1  Chronic kidney disease stage 3 with previous creatinine around 1 2-1 35, noted recently up to 1 5    Chronic kidney disease suspected secondary to hypertension, obesity as well as age-related nephron loss  Most recent creatinine 1 5, no proteinuria   Blood pressure today in the office well controlled  Noted patient gained 5 lb since last office visit 10 months ago  Once again discussed with patient about importance of losing weight, follow low-salt diet, avoid NSAIDs, stay well-hydrated  I would like to follow repeat blood and urine test in 6 months, will contact him with the results  If his kidney function remains stable then I would like to see him back in the office in 1 year    2  Hypertension, blood pressure well controlled on combination Ace inhibitors with HCTZ  Follow low-salt diet  Advised to lose weight    3  Osteoarthrosis, status post right knee arthroplasty on 06/17  Doing well  Advised to avoid NSAIDs    4  Morbid obesity, advised to lose weight    Gained 5 lb since last office visit    Patient Instructions   As we discussed in the office visit, based on the most recent blood test your kidney function is slowly decline compared to last year  I would like you to go for repeat blood and urine test in 6 months, will contact you with the results  If your kidney function remains stable I would like to see you back in the office in 1 year  Remember to keep working on losing weight, try to stay more active, decrease portion size of your meals  Remember to follow low-salt diet less than 2 g in a day  Stay well-hydrated  Avoid NSAIDs (no ibuprofen, Motrin, Advil, Aleve, naproxen)  Okay to take Tylenol or paracetamol or acetaminophen as needed for pain  Continue close follow-up with primary care doctor        HPI: Jayme Ortiz is a 82 y o  male who is here for Follow-up and Chronic Kidney Disease    This is a patient history of chronic kidney disease, hypertension, obesity, osteoarthrosis post right knee arthroplasty on 06/16, who returns to the office for CKD follow-up    Last time patient was seen was on 11/2021  Patient returns to the office with his wife   Patient currently has no complaints at this time and is feeling well  Reports some recent skin infection on his right leg, his primary care doctor put him on Keflex for 2 weeks recently  Patient denies any chest pain, shortness of breath, no leg swelling  Denies any abdominal pain, no nausea, vomiting, no diarrhea or constipation  Denies any urinary problems, no burning sensation or gross hematuria  The last blood work was done on 10/14/2022, which we have reviewed together  Most recent creatinine 1 5, eGFR 42, hemoglobin and electrolytes normal, upc <0 09, PTH 98      ROS: All the systems were reviewed and were negative except as documented on the H&P  Allergies: Patient has no known allergies      Medications:   Current Outpatient Medications:   •  aspirin 81 mg chewable tablet, Chew daily , Disp: , Rfl:   •  cephalexin (KEFLEX) 500 mg capsule, Take 500 mg by mouth every 6 (six) hours, Disp: , Rfl:   •  gabapentin (NEURONTIN) 100 mg capsule, Take 100 mg by mouth 2 (two) times a day, Disp: , Rfl:   •  lisinopril-hydrochlorothiazide (PRINZIDE,ZESTORETIC) 20-25 MG per tablet, Take 1 tablet by mouth daily, Disp: , Rfl:   •  LUMIGAN 0 01 % ophthalmic drops, Administer into the left eye daily at bedtime , Disp: , Rfl:   •  docusate sodium (COLACE) 100 mg capsule, Take 1 capsule (100 mg total) by mouth 2 (two) times a day (Patient not taking: No sig reported), Disp: 10 capsule, Rfl: 0  •  enoxaparin (LOVENOX) 40 mg/0 4 mL, Inject 0 4 mL (40 mg total) under the skin daily (Patient not taking: No sig reported), Disp: 28 mL, Rfl: 0  •  oxyCODONE (ROXICODONE) 5 mg immediate release tablet, 1 pill po Q6 Hrs prn severe pain (Patient not taking: No sig reported), Disp: 20 tablet, Rfl: 0    Past Medical History:   Diagnosis Date   • Chronic kidney disease    • Hypertension    • Sleep apnea     no CPAP      Past Surgical History:   Procedure Laterality Date   • CARPAL TUNNEL RELEASE     • COLONOSCOPY     • [place] ARTHROCENTESIS ASPIR&/INJ MAJOR JT/BURSA W/O US  6/14/2018    Procedure: ASPIRATION RIGHT KNEE BURSA; Surgeon: Lina Garay MD;  [facility];  Service: Orthopedics   • [place] TOTAL KNEE ARTHROPLASTY Left 6/14/2018    Procedure: ARTHROPLASTY KNEE TOTAL;  Surgeon: Lina Garay MD;  [facility];  Service: Orthopedics   • [place] TOTAL KNEE ARTHROPLASTY Right 6/16/2021    Procedure: ARTHROPLASTY KNEE TOTAL;  Surgeon: Lina Garay MD;  [facility];  Service: Orthopedics     Family History   Problem Relation Age of Onset   • No Known Problems Mother    • No Known Problems Father       reports that he has quit smoking  He has never used smokeless tobacco  He reports previous alcohol use  He reports that he does not use drugs        Physical Exam:   Vitals:    10/19/22 1301   BP: 116/66   BP Location: Left arm   Patient Position: Sitting   Cuff Size: Large   Pulse: 80   SpO2: 95%   Weight: 136 kg (299 lb)   Height: 6' (1 829 m)     Body mass index is 40 55 kg/m²      General: conscious, cooperative, in not acute distress  Eyes: conjunctivae pink, anicteric sclerae  ENT: lips and mucous membranes moist  Neck: supple, no JVD  Chest: clear breath sounds bilateral, no crackles, ronchus or wheezings  CVS: distinct S1 & S2, normal rate, regular rhythm  Abdomen: obese, non-tender, non-distended, normoactive bowel sounds  Back: no CVA tenderness  Extremities: no edema of both legs  Skin: no rash, right leg covered with dressing  Neuro: awake, alert, oriented        Lab Results:  Results for orders placed or performed in visit on 10/14/22   CBC   Result Value Ref Range    WBC 5 56 4 31 - 10 16 Thousand/uL    RBC 4 33 3 88 - 5 62 Million/uL    Hemoglobin 14 5 12 0 - 17 0 g/dL    Hematocrit 44 7 36 5 - 49 3 %     (H) 82 - 98 fL    MCH 33 5 26 8 - 34 3 pg    MCHC 32 4 31 4 - 37 4 g/dL    RDW 12 8 11 6 - 15 1 %    Platelets 549 614 - 633 Thousands/uL    MPV 10 1 8 9 - 12 7 fL   Renal function panel   Result Value Ref Range    Albumin 3 8 3 5 - 5 0 g/dL    Calcium 9 6 8 3 - 10 1 mg/dL    Phosphorus 3 5 2 3 - 4 1 mg/dL    Glucose 117 65 - 140 mg/dL    BUN 29 (H) 5 - 25 mg/dL    Creatinine 1 50 (H) 0 60 - 1 30 mg/dL    Sodium 138 135 - 147 mmol/L    Potassium 4 4 3 5 - 5 3 mmol/L    Chloride 107 96 - 108 mmol/L    CO2 26 21 - 32 mmol/L    ANION GAP 5 4 - 13 mmol/L    eGFR 42 ml/min/1 73sq m   PTH, intact   Result Value Ref Range    PTH 98 3 (H) 18 4 - 80 1 pg/mL   Protein / creatinine ratio, urine   Result Value Ref Range    Creatinine, Ur 72 9 mg/dL    Protein Urine Random <6 mg/dL    Prot/Creat Ratio, Ur <0 08 0 00 - 0 10       Results from last 7 days   Lab Units 10/14/22  1350   WBC Thousand/uL 5 56   HEMOGLOBIN g/dL 14 5   HEMATOCRIT % 44 7   PLATELETS Thousands/uL 199   SODIUM mmol/L 138   POTASSIUM mmol/L 4 4 CHLORIDE mmol/L 107   CO2 mmol/L 26   BUN mg/dL 29*   CREATININE mg/dL 1 50*   CALCIUM mg/dL 9 6   PHOSPHORUS mg/dL 3 5           Portions of the record may have been created with voice recognition software  Occasional wrong word or "sound a like" substitutions may have occurred due to the inherent limitations of voice recognition software  Read the chart carefully and recognize, using context, where substitutions have occurred  If you have any questions, please contact the dictating provider

## 2022-10-19 NOTE — PATIENT INSTRUCTIONS
As we discussed in the office visit, based on the most recent blood test your kidney function is slowly decline compared to last year  I would like you to go for repeat blood and urine test in 6 months, will contact you with the results  If your kidney function remains stable I would like to see you back in the office in 1 year  Remember to keep working on losing weight, try to stay more active, decrease portion size of your meals  Remember to follow low-salt diet less than 2 g in a day  Stay well-hydrated  Avoid NSAIDs (no ibuprofen, Motrin, Advil, Aleve, naproxen)  Okay to take Tylenol or paracetamol or acetaminophen as needed for pain    Continue close follow-up with primary care doctor

## 2022-10-19 NOTE — LETTER
October 19, 2022     [address], [place]  [address]  [facility] [address]    Patient: Shannan Brooks   YOB: 1935   Date of Visit: 10/19/2022       Dear Dr Saurabh Herrera: Thank you for referring Shannan Brooks to me for evaluation  Below are my notes for this consultation  If you have questions, please do not hesitate to call me  I look forward to following your patient along with you  Sincerely,        Arun Tabares MD        CC: No Recipients  Arun Tabares MD  10/19/2022  1:38 PM  Sign when Signing Visit  OFFICE FOLLOW UP - Nephrology   Shannan Brooks 82 y o  male MRN: [id number]       ASSESSMENT and PLAN:  Martha Hayes was seen today for follow-up and chronic kidney disease  Diagnoses and all orders for this visit:    Stage 3a chronic kidney disease ([facility] [address] )  -     PTH, intact; Future  -     Renal function panel; Future  -     Urinalysis with microscopic; Future  -     Protein / creatinine ratio, urine; Future    Class 3 severe obesity due to excess calories without serious comorbidity with body mass index (BMI) of 40 0 to 44 9 in adult [facility])    Essential hypertension    S/P total knee arthroplasty, right        This is an 79-year-old gentleman with history hypertension, obesity, osteoarthritis of the right knee, chronic kidney disease with creatinine fluctuating around 1 2-1 3 since 2018, who was initially seen for CKD evaluation prior to right knee arthroplasty 06/03/2021  Today returns to the office for CKD follow-up    1  Chronic kidney disease stage 3 with previous creatinine around 1 2-1 35, noted recently up to 1 5    Chronic kidney disease suspected secondary to hypertension, obesity as well as age-related nephron loss  Most recent creatinine 1 5, no proteinuria   Blood pressure today in the office well controlled  Noted patient gained 5 lb since last office visit 10 months ago      Once again discussed with patient about importance of losing weight, follow low-salt diet, avoid NSAIDs, stay well-hydrated  I would like to follow repeat blood and urine test in 6 months, will contact him with the results  If his kidney function remains stable then I would like to see him back in the office in 1 year    2  Hypertension, blood pressure well controlled on combination Ace inhibitors with HCTZ  Follow low-salt diet  Advised to lose weight    3  Osteoarthrosis, status post right knee arthroplasty on 06/17  Doing well  Advised to avoid NSAIDs    4  Morbid obesity, advised to lose weight  Gained 5 lb since last office visit    Patient Instructions   As we discussed in the office visit, based on the most recent blood test your kidney function is slowly decline compared to last year  I would like you to go for repeat blood and urine test in 6 months, will contact you with the results  If your kidney function remains stable I would like to see you back in the office in 1 year  Remember to keep working on losing weight, try to stay more active, decrease portion size of your meals  Remember to follow low-salt diet less than 2 g in a day  Stay well-hydrated  Avoid NSAIDs (no ibuprofen, Motrin, Advil, Aleve, naproxen)  Okay to take Tylenol or paracetamol or acetaminophen as needed for pain  Continue close follow-up with primary care doctor        HPI: Jayme Ortiz is a 82 y o  male who is here for Follow-up and Chronic Kidney Disease    This is a patient history of chronic kidney disease, hypertension, obesity, osteoarthrosis post right knee arthroplasty on 06/16, who returns to the office for CKD follow-up    Last time patient was seen was on 11/2021  Patient returns to the office with his wife   Patient currently has no complaints at this time and is feeling well  Reports some recent skin infection on his right leg, his primary care doctor put him on Keflex for 2 weeks recently  Patient denies any chest pain, shortness of breath, no leg swelling     Denies any abdominal pain, no nausea, vomiting, no diarrhea or constipation  Denies any urinary problems, no burning sensation or gross hematuria  The last blood work was done on 10/14/2022, which we have reviewed together  Most recent creatinine 1 5, eGFR 42, hemoglobin and electrolytes normal, upc <0 09, PTH 98      ROS: All the systems were reviewed and were negative except as documented on the H&P  Allergies: Patient has no known allergies  Medications:   Current Outpatient Medications:   •  aspirin 81 mg chewable tablet, Chew daily , Disp: , Rfl:   •  cephalexin (KEFLEX) 500 mg capsule, Take 500 mg by mouth every 6 (six) hours, Disp: , Rfl:   •  gabapentin (NEURONTIN) 100 mg capsule, Take 100 mg by mouth 2 (two) times a day, Disp: , Rfl:   •  lisinopril-hydrochlorothiazide (PRINZIDE,ZESTORETIC) 20-25 MG per tablet, Take 1 tablet by mouth daily, Disp: , Rfl:   •  LUMIGAN 0 01 % ophthalmic drops, Administer into the left eye daily at bedtime , Disp: , Rfl:   •  docusate sodium (COLACE) 100 mg capsule, Take 1 capsule (100 mg total) by mouth 2 (two) times a day (Patient not taking: No sig reported), Disp: 10 capsule, Rfl: 0  •  enoxaparin (LOVENOX) 40 mg/0 4 mL, Inject 0 4 mL (40 mg total) under the skin daily (Patient not taking: No sig reported), Disp: 28 mL, Rfl: 0  •  oxyCODONE (ROXICODONE) 5 mg immediate release tablet, 1 pill po Q6 Hrs prn severe pain (Patient not taking: No sig reported), Disp: 20 tablet, Rfl: 0    Past Medical History:   Diagnosis Date   • Chronic kidney disease    • Hypertension    • Sleep apnea     no CPAP      Past Surgical History:   Procedure Laterality Date   • CARPAL TUNNEL RELEASE     • COLONOSCOPY     • [place] ARTHROCENTESIS ASPIR&/INJ MAJOR JT/BURSA W/O US  6/14/2018    Procedure: ASPIRATION RIGHT KNEE BURSA;   Surgeon: Alicia Rodrigues MD;  [facility];  Service: Orthopedics   • [place] TOTAL KNEE ARTHROPLASTY Left 6/14/2018    Procedure: ARTHROPLASTY KNEE TOTAL;  Surgeon: Angélica Salcido MD;  [facility];  Service: Orthopedics   • [place] TOTAL KNEE ARTHROPLASTY Right 6/16/2021    Procedure: ARTHROPLASTY KNEE TOTAL;  Surgeon: Angélica Salcido MD;  [facility];  Service: Orthopedics     Family History   Problem Relation Age of Onset   • No Known Problems Mother    • No Known Problems Father       reports that he has quit smoking  He has never used smokeless tobacco  He reports previous alcohol use  He reports that he does not use drugs  Physical Exam:   Vitals:    10/19/22 1301   BP: 116/66   BP Location: Left arm   Patient Position: Sitting   Cuff Size: Large   Pulse: 80   SpO2: 95%   Weight: 136 kg (299 lb)   Height: 6' (1 829 m)     Body mass index is 40 55 kg/m²      General: conscious, cooperative, in not acute distress  Eyes: conjunctivae pink, anicteric sclerae  ENT: lips and mucous membranes moist  Neck: supple, no JVD  Chest: clear breath sounds bilateral, no crackles, ronchus or wheezings  CVS: distinct S1 & S2, normal rate, regular rhythm  Abdomen: obese, non-tender, non-distended, normoactive bowel sounds  Back: no CVA tenderness  Extremities: no edema of both legs  Skin: no rash, right leg covered with dressing  Neuro: awake, alert, oriented        Lab Results:  Results for orders placed or performed in visit on 10/14/22   CBC   Result Value Ref Range    WBC 5 56 4 31 - 10 16 Thousand/uL    RBC 4 33 3 88 - 5 62 Million/uL    Hemoglobin 14 5 12 0 - 17 0 g/dL    Hematocrit 44 7 36 5 - 49 3 %     (H) 82 - 98 fL    MCH 33 5 26 8 - 34 3 pg    MCHC 32 4 31 4 - 37 4 g/dL    RDW 12 8 11 6 - 15 1 %    Platelets 799 127 - 274 Thousands/uL    MPV 10 1 8 9 - 12 7 fL   Renal function panel   Result Value Ref Range    Albumin 3 8 3 5 - 5 0 g/dL    Calcium 9 6 8 3 - 10 1 mg/dL    Phosphorus 3 5 2 3 - 4 1 mg/dL    Glucose 117 65 - 140 mg/dL    BUN 29 (H) 5 - 25 mg/dL    Creatinine 1 50 (H) 0 60 - 1 30 mg/dL    Sodium 138 135 - 147 mmol/L    Potassium 4 4 3 5 - 5 3 mmol/L    Chloride 107 96 - 108 mmol/L    CO2 26 21 - 32 mmol/L    ANION GAP 5 4 - 13 mmol/L    eGFR 42 ml/min/1 73sq m   PTH, intact   Result Value Ref Range    PTH 98 3 (H) 18 4 - 80 1 pg/mL   Protein / creatinine ratio, urine   Result Value Ref Range    Creatinine, Ur 72 9 mg/dL    Protein Urine Random <6 mg/dL    Prot/Creat Ratio, Ur <0 08 0 00 - 0 10       Results from last 7 days   Lab Units 10/14/22  1350   WBC Thousand/uL 5 56   HEMOGLOBIN g/dL 14 5   HEMATOCRIT % 44 7   PLATELETS Thousands/uL 199   SODIUM mmol/L 138   POTASSIUM mmol/L 4 4   CHLORIDE mmol/L 107   CO2 mmol/L 26   BUN mg/dL 29*   CREATININE mg/dL 1 50*   CALCIUM mg/dL 9 6   PHOSPHORUS mg/dL 3 5           Portions of the record may have been created with voice recognition software  Occasional wrong word or "sound a like" substitutions may have occurred due to the inherent limitations of voice recognition software  Read the chart carefully and recognize, using context, where substitutions have occurred  If you have any questions, please contact the dictating provider

## 2022-11-30 ENCOUNTER — OFFICE VISIT (OUTPATIENT)
Dept: DERMATOLOGY | Facility: CLINIC | Age: 87
End: 2022-11-30

## 2022-11-30 VITALS — HEIGHT: 69 IN | TEMPERATURE: 97.6 F | BODY MASS INDEX: 42.95 KG/M2 | WEIGHT: 290 LBS

## 2022-11-30 DIAGNOSIS — L20.89 OTHER ATOPIC DERMATITIS: ICD-10-CM

## 2022-11-30 DIAGNOSIS — I87.2 VENOUS STASIS DERMATITIS OF RIGHT LOWER EXTREMITY: Primary | ICD-10-CM

## 2022-11-30 DIAGNOSIS — L85.3 XEROSIS OF SKIN: ICD-10-CM

## 2022-11-30 NOTE — PATIENT INSTRUCTIONS
Assessment and Plan:  Based on a thorough discussion of this condition and the management approach to it (including a comprehensive discussion of the known risks, side effects and potential benefits of treatment), the patient (family) agrees to implement the following specific plan:  Start using compression stockings  Start Triamcinolone 0 1% Ointment  Apply topically twice a day for up to 14 days to active areas as needed  May repeat course after taking 1 week break  Do not use on face, groin, armpits  What is stasis dermatitis? Stasis dermatitis is a type of eczema that results from poor circulation in the lower legs  Normally  the leg muscles pump blood back toward the heart and valves in the veins prevent backflow  When the valves fail, fluid collects in the lower legs and causes inflammation    Who gets it? Usually middle-aged and elderly patients  The following things can may you more prone to it  History of deep venous thrombosis  History of cellulitis  Chronic swelling of the lower leg - can be worsened by hot weather and prolonged standing  Varicose veins  Venous leg ulcers  What are the complications of stasis dermatitis?  secondary infection, usually from Staphylococcus aureus resulting in yellowish crusts  Cellulitis -- infection with Streptococcus pyogenes: there may be redness, swelling, pain, fever, a red streak up the leg and swollen nodes in the groin  Secondary eczema -- the eczema spreads to other areas on the body  Contact allergy to one or more components of the ointments or creams used      WHAT IS THE TREATMENT? Reduce swelling in the leg  Don't sit with legs down for long periods  Take regular walks  Elevate your feet when sitting: if your legs are swollen they need to be above your hips to drain effectively  Elevate the foot of your bed overnight    Support stockings or wraps to reduce the swelling - usually wraps that can be washed are used when the rash is weepy and graduated compression socks or stockings the rest of the time to prevent flare ups  Fitted moderate to high compression socks can be obtained from a surgical supplies company  Light compression using travel socks may be adequate, and these are easy to put on  They can be bought at pharmacies, travel and sports stores  Horse chestnut extract appears to be of benefit for at least some patients with venous disease  Treat the eczema  Dry up oozing patches by applying damp compresses using domeborrow's solution 1-2 times daily  Apply the prescribed topical steroid  Use a moisturizing cream frequently to keep the skin on the legs smooth and soft  Protect your skin from injury: this can result in infection or ulceration that may be difficult to heal     Treat the varicose veins  Seek the opinion of a vascular surgeon regarding varicose veins  These can be treated surgically or sclerotherapy  Check with [facility] to see if you are a candidate for this    How can flare ups be prevented? Flare ups are common  Avoid prolonged sitting with legs down  Wear compression socks or stockings daily  Avoid and treat leg swelling  Apply moisturizer at least once daily  Don't use  soap on legs, let the water just rinse off the legs or use a soap less cleansers like cetaphil when bathing  Start topical steroids immediately when rash or itching starts    COMPRESSION STOCKINGS   Start using compression stockings  We recommend wearing these as long as possible/tolerated throughout the day  You may purchase these over the counter  They should be 15-30 mm Hg compression level  You can purchase these within several pharmacies, or online at:   [employer]   [email or web address]     Assessment and Plan:  Based on a thorough discussion of this condition and the management approach to it (including a comprehensive discussion of the known risks, side effects and potential benefits of treatment), the patient (family) agrees to implement the following specific plan:  Use moisturizer like Eucerin,Cerave, Vanicream or Aveeno Cream 3 times a day for the dry skin                   Dry skin refers to skin that feels dry to touch  Dry skin has a dull surface with a rough, scaly quality  The skin is less pliable and cracked  When dryness is severe, the skin may become inflamed and fissured  Although any body site can be dry, dry skin tends to affect the shins more than any other site  Dry skin is lacking moisture in the outer horny cell layer (stratum corneum) and this results in cracks in the skin surface  Dry skin is also called xerosis, xeroderma or asteatosis (lack of fat)  It can affect males and females of all ages  There is some racial variability in water and lipid content of the skin  Dry skin that starts in early childhood may be one of about 20 types of ichthyosis (fish-scale skin)  There is often a family history of dry skin  Dry skin is commonly seen in people with atopic dermatitis  Nearly everyone > 60 years has dry skin  Dry skin that begins later may be seen in people with certain diseases and conditions  Postmenopausal women  Hypothyroidism  Chronic renal disease   Malnutrition and weight loss   Subclinical dermatitis   Treatment with certain drugs such as oral retinoids, diuretics and epidermal growth factor receptor inhibitors    People exposed to a dry environment may experience dry skin  Low humidity: in desert climates or cool, windy conditions   Excessive air conditioning   Direct heat from a fire or fan heater   Excessive bathing   Contact with soap, detergents and solvents   Inappropriate topical agents such as alcohol   Frictional irritation from rough clothing or abrasives    Dry skin is due to abnormalities in the integrity of the barrier function of the stratum corneum, which is made up of corneocytes  There is an overall reduction in the lipids in the stratum corneum     Ratio of ceramides, cholesterol and free fatty acids may be normal or altered  There may be a reduction in the proliferation of keratinocytes  Keratinocyte subtypes change in dry skin with a decrease in keratins K1, K10 and increase in K5, K14  Involucrin (a protein) may be expressed early, increasing cell stiffness  The result is retention of corneocytes and reduced water-holding capacity  The inherited forms of ichthyosis are due to loss of function mutations in various genes (listed in parentheses below)  The clinical features of ichthyosis depend on the specific type of ichthyosis:  Ichthyosis vulgaris (FLG)  Recessive X-linked ichthyosis (STS)   Autosomal recessive congenital ichthyosis (ABCA12, TGM1, ALOXE3)   Keratinopathic ichthyoses (KRT1, KRT10, KRT2)    Acquired ichthyosis may be due to:  Metabolic factors: thyroid deficiency   Illness: lymphoma, internal malignancy, sarcoidosis, HIV infection   Drugs: nicotinic acid, kava, protein kinase inhibitors (eg EGFR inhibitors), hydroxyurea  Complications of dry skin:  Dry areas of skin may become itchy, indicating a form of eczema/dermatitis has developed  Atopic eczema -- especially in people with ichthyosis vulgaris   Eczema craquelé -- especially in elderly people  Also called asteatotic eczema   A dry form of nummular dermatitis/discoid eczema -- especially in people that wash their skin excessively  When the dry skin of an elderly person is itchy without a visible rash, it is sometimes called winter itch, 7th age itch, senile pruritus or chronic pruritus of the elderly  Other complications of dry skin may include:  Skin infection when bacteria or viruses penetrate a break in the skin surface   Overheating, especially in some forms of ichthyosis   Food allergy, eg, to peanuts, has been associated with filaggrin mutations   Contact allergy, eg, to nickel, has also been correlated with barrier function defects      How is the type of dry skin diagnosed? The type of dry skin is diagnosed by careful history and examination  In children:  Family history   Age of onset   Appearance at birth, if known   Distribution of dry skin   Other features, eg eczema, abnormal nails, hair, dentition, sight, hearing  In adults:  Medical history   Medications and topical preparations   Bathing frequency and use of soap   Evaluation of environmental factors that may contribute to dry skin  What is the treatment for dry skin? The mainstay of treatment of dry skin and ichthyosis is moisturisers/emollients  They should be applied liberally and often enough to:  Reduce itch   Improve the barrier function   Prevent entry of irritants, bacteria   Reduce transepidermal water loss  When considering which emollient is most suitable, consider:  Severity of the dryness   Tolerance   Personal preference   Cost and availability  Emollients generally work best if applied to damp skin, if pH is below 7 (acidic), and if containing humectants such as urea or propylene glycol  Additional treatments include:  Topical steroid if itchy or there is dermatitis -- choose an emollient base   Topical calcineurin inhibitors if topical steroids are unsuitable  How can dry skin be prevented? Eliminate aggravating factors:  Reduce the frequency of bathing  A humidifier in winter and air conditioner in summer   Compare having a short shower with a prolonged soak in a bath  Use lukewarm, not hot, water  Replace standard soap with a substitute such as a synthetic detergent cleanser, water-miscible emollient, bath oil, anti-pruritic tar oil, colloidal oatmeal etc    Apply an emollient liberally and often, particularly shortly after bathing, and when itchy  The drier the skin, the thicker this should be, especially on the hands  What is the outlook for dry skin? A tendency to dry skin may persist life-long, or it may improve once contributing factors are controlled

## 2022-11-30 NOTE — PROGRESS NOTES
[facility] Note     Patient Name: Inez Poe  Encounter Date: 11/30/2022     Have you been cared for by a [place] [id number] Dermatologist in the last 3 years and, if so, which description applies to you? NO  I am considered a "new" patient and must complete all patient intake questions  I am MALE/not capable of bearing children  REVIEW OF SYSTEMS:  Have you recently had or currently have any of the following? · Recent fever or chills? No  · Any non-healing wound? No   PAST MEDICAL HISTORY:  Have you personally ever had or currently have any of the following? If "YES," then please provide more detail  · Skin cancer (such as Melanoma, Basal Cell Carcinoma, Squamous Cell Carcinoma? No  · Tuberculosis, HIV/AIDS, Hepatitis B or C: No  · Systemic Immunosuppression such as Diabetes, Biologic or Immunotherapy, Chemotherapy, Organ Transplantation, Bone Marrow Transplantation No  · Radiation Treatment No   FAMILY HISTORY:  Any "first degree relatives" (parent, brother, sister, or child) with the following? • Skin Cancer, Pancreatic or Other Cancer? No   PATIENT EXPERIENCE:    • Do you want the Dermatologist to perform a COMPLETE skin exam today including a clinical examination under the "bra and underwear" areas? NO  • If necessary, do we have your permission to call and leave a detailed message on your Preferred Phone number that includes your specific medical information?   NO      No Known Allergies   Current Outpatient Medications:   •  aspirin 81 mg chewable tablet, Chew daily , Disp: , Rfl:   •  gabapentin (NEURONTIN) 100 mg capsule, Take 100 mg by mouth 2 (two) times a day, Disp: , Rfl:   •  lisinopril-hydrochlorothiazide (PRINZIDE,ZESTORETIC) 20-25 MG per tablet, Take 1 tablet by mouth daily, Disp: , Rfl:   •  LUMIGAN 0 01 % ophthalmic drops, Administer into the left eye daily at bedtime , Disp: , Rfl:   •  docusate sodium (COLACE) 100 mg capsule, Take 1 capsule (100 mg total) by mouth 2 (two) times a day (Patient not taking: Reported on 9/10/2021), Disp: 10 capsule, Rfl: 0  •  enoxaparin (LOVENOX) 40 mg/0 4 mL, Inject 0 4 mL (40 mg total) under the skin daily (Patient not taking: Reported on 9/10/2021), Disp: 28 mL, Rfl: 0  •  oxyCODONE (ROXICODONE) 5 mg immediate release tablet, 1 pill po Q6 Hrs prn severe pain (Patient not taking: Reported on 9/10/2021), Disp: 20 tablet, Rfl: 0          • Whom besides the patient is providing clinical information about today's encounter?   o NO ADDITIONAL HISTORIAN (patient alone provided history)    Physical Exam and Assessment/Plan by Diagnosis:    1  STATIS DERMATITIS ("VENOUS ECZEMA")  2  AUTOECZEMATIZATION ("ID REACTION")    Physical Exam:  • Anatomic Location Affected:  Right shin, Bilateral forearms   • Morphological Description:  Eczematous scaly plaques  Shin with some hemosiderin deposition  2+ pitting edema in BLE  • Pertinent Positives: Good peripheral pulses in BLE  • Pertinent Negatives: n/a    Additional History of Present Condition:  Located on the right shin, bilateral forearms  Started on right shin and then developed additional areas of involvement on arms  Notes that he had a knee surgery on the right knee preceding the onset of the rash on the right shin and that he similarly had a rash on the left shin after a knee surgery on the opposite leg that has since healed with just some discoloration  Notes decreased activity iso operation and peripheral neuropathy that has worsened  Reports itching and scratching   Was treated with Clotrimazole Lotion for 2 weeks, Cephalexin 500 mg x 7 days, Prednisone with no improvement     Assessment and Plan:  - Educated patient that history and clinical findings are most consistent with stasis dermatitis exacerbated by recent surgery and decreased movement, now with associated ID reaction    - Educated that for ID reactions, we can treat symptomatically but that addressing the underlying trigger is the most important step in treatment, which, in this case, is stasis dermatitis  - Educated that the cornerstone of stasis dermatitis treatment is compression and exercise  - Educated that we need to further help with cessation of the itch scratch cycle through symptomatic treatment of pruritic areas with topical steroids and good emollient regimen  Based on a thorough discussion of this condition and the management approach to it (including a comprehensive discussion of the known risks, side effects and potential benefits of treatment), the patient (family) agrees to implement the following specific plan:  - Start using compression stockings; educated to buy on [employer] and to wear during the day but remove at night  Start Triamcinolone 0 1% Ointment  Apply topically twice a day for up to 14 days to active areas as needed  May repeat course after taking 1 week break  Do not use on face, groin, armpits  Do not use on open skin      COMPRESSION STOCKINGS   Start using compression stockings  We recommend wearing these as long as possible/tolerated throughout the day  You may purchase these over the counter  They should be 15-30 mm Hg compression level  You can purchase these within several pharmacies, or online at:   [employer]   [email or web address]     3  XEROSIS ("DRY SKIN")    Physical Exam:  • Anatomic Location Affected:  Bilateral arms and legs   • Morphological Description:  Mild xerosis    Assessment and Plan:  Based on a thorough discussion of this condition and the management approach to it (including a comprehensive discussion of the known risks, side effects and potential benefits of treatment), the patient (family) agrees to implement the following specific plan:  Use moisturizer like Eucerin,Cerave, Vanicream or Aveeno Cream 3 times a day for the dry skin                   Dry skin refers to skin that feels dry to touch  Dry skin has a dull surface with a rough, scaly quality   The skin is less pliable and cracked  When dryness is severe, the skin may become inflamed and fissured  Although any body site can be dry, dry skin tends to affect the shins more than any other site  Dry skin is lacking moisture in the outer horny cell layer (stratum corneum) and this results in cracks in the skin surface  Dry skin is also called xerosis, xeroderma or asteatosis (lack of fat)  It can affect males and females of all ages  There is some racial variability in water and lipid content of the skin  • Dry skin that starts in early childhood may be one of about 20 types of ichthyosis (fish-scale skin)  There is often a family history of dry skin  • Dry skin is commonly seen in people with atopic dermatitis  • Nearly everyone > 60 years has dry skin  Dry skin that begins later may be seen in people with certain diseases and conditions  • Postmenopausal women  • Hypothyroidism  • Chronic renal disease   • Malnutrition and weight loss   • Subclinical dermatitis   • Treatment with certain drugs such as oral retinoids, diuretics and epidermal growth factor receptor inhibitors    People exposed to a dry environment may experience dry skin  • Low humidity: in desert climates or cool, windy conditions   • Excessive air conditioning   • Direct heat from a fire or fan heater   • Excessive bathing   • Contact with soap, detergents and solvents   • Inappropriate topical agents such as alcohol   • Frictional irritation from rough clothing or abrasives    Dry skin is due to abnormalities in the integrity of the barrier function of the stratum corneum, which is made up of corneocytes  • There is an overall reduction in the lipids in the stratum corneum  • Ratio of ceramides, cholesterol and free fatty acids may be normal or altered  • There may be a reduction in the proliferation of keratinocytes     • Keratinocyte subtypes change in dry skin with a decrease in keratins K1, K10 and increase in K5, K14    • Involucrin (a protein) may be expressed early, increasing cell stiffness  • The result is retention of corneocytes and reduced water-holding capacity  The inherited forms of ichthyosis are due to loss of function mutations in various genes (listed in parentheses below)  The clinical features of ichthyosis depend on the specific type of ichthyosis:  • Ichthyosis vulgaris (FLG)  • Recessive X-linked ichthyosis (STS)   • Autosomal recessive congenital ichthyosis (ABCA12, TGM1, ALOXE3)   • Keratinopathic ichthyoses (KRT1, KRT10, KRT2)    Acquired ichthyosis may be due to:  • Metabolic factors: thyroid deficiency   • Illness: lymphoma, internal malignancy, sarcoidosis, HIV infection   • Drugs: nicotinic acid, kava, protein kinase inhibitors (eg EGFR inhibitors), hydroxyurea  Complications of dry skin:  Dry areas of skin may become itchy, indicating a form of eczema/dermatitis has developed  • Atopic eczema -- especially in people with ichthyosis vulgaris   • Eczema craquelé -- especially in elderly people  Also called asteatotic eczema   • A dry form of nummular dermatitis/discoid eczema -- especially in people that wash their skin excessively  When the dry skin of an elderly person is itchy without a visible rash, it is sometimes called winter itch, 7th age itch, senile pruritus or chronic pruritus of the elderly  Other complications of dry skin may include:  • Skin infection when bacteria or viruses penetrate a break in the skin surface   • Overheating, especially in some forms of ichthyosis   • Food allergy, eg, to peanuts, has been associated with filaggrin mutations   • Contact allergy, eg, to nickel, has also been correlated with barrier function defects  How is the type of dry skin diagnosed? The type of dry skin is diagnosed by careful history and examination      In children:  • Family history   • Age of onset   • Appearance at birth, if known   • Distribution of dry skin   • Other features, eg eczema, abnormal nails, hair, dentition, sight, hearing  In adults:  • Medical history   • Medications and topical preparations   • Bathing frequency and use of soap   • Evaluation of environmental factors that may contribute to dry skin  What is the treatment for dry skin? The mainstay of treatment of dry skin and ichthyosis is moisturisers/emollients  They should be applied liberally and often enough to:  • Reduce itch   • Improve the barrier function   • Prevent entry of irritants, bacteria   • Reduce transepidermal water loss  When considering which emollient is most suitable, consider:  • Severity of the dryness   • Tolerance   • Personal preference   • Cost and availability  Emollients generally work best if applied to damp skin, if pH is below 7 (acidic), and if containing humectants such as urea or propylene glycol  Additional treatments include:  • Topical steroid if itchy or there is dermatitis -- choose an emollient base   • Topical calcineurin inhibitors if topical steroids are unsuitable  How can dry skin be prevented? Eliminate aggravating factors:  • Reduce the frequency of bathing  • A humidifier in winter and air conditioner in summer   • Compare having a short shower with a prolonged soak in a bath  • Use lukewarm, not hot, water  • Replace standard soap with a substitute such as a synthetic detergent cleanser, water-miscible emollient, bath oil, anti-pruritic tar oil, colloidal oatmeal etc    • Apply an emollient liberally and often, particularly shortly after bathing, and when itchy  The drier the skin, the thicker this should be, especially on the hands  What is the outlook for dry skin? A tendency to dry skin may persist life-long, or it may improve once contributing factors are controlled      Scribe Attestation    I,:  Ej Jarquin MA am acting as a scribe while in the presence of the attending physician :       I,:  Mariela Solomon MD personally performed the services described in this documentation    as scribed in my presence :

## 2022-12-22 ENCOUNTER — OFFICE VISIT (OUTPATIENT)
Dept: DERMATOLOGY | Facility: CLINIC | Age: 87
End: 2022-12-22

## 2022-12-22 VITALS — TEMPERATURE: 97.8 F | HEIGHT: 69 IN | BODY MASS INDEX: 42.95 KG/M2 | WEIGHT: 290 LBS

## 2022-12-22 DIAGNOSIS — R21 RASH AND NONSPECIFIC SKIN ERUPTION: Primary | ICD-10-CM

## 2022-12-22 DIAGNOSIS — I83.10 VENOUS ECZEMA: ICD-10-CM

## 2022-12-22 NOTE — PROGRESS NOTES
[facility] [address] [facility] Note     Patient Name: Lelia Majano  Encounter Date: 12/22/22     Have you been cared for by a [facility] [facility] [address] [facility]atologist in the last 3 years and, if so, which description applies to you? Yes  I have been here within the last 3 years, and my medical history has NOT changed since that time  I am MALE/not capable of bearing children  REVIEW OF SYSTEMS:  Have you recently had or currently have any of the following? · No changes in my recent health  PAST MEDICAL HISTORY:  Have you personally ever had or currently have any of the following? If "YES," then please provide more detail  · No changes in my medical history  FAMILY HISTORY:  Any "first degree relatives" (parent, brother, sister, or child) with the following? • No changes in my family's known health  PATIENT EXPERIENCE:    • Do you want the Dermatologist to perform a COMPLETE skin exam today including a clinical examination under the "bra and underwear" areas? NO  • If necessary, do we have your permission to call and leave a detailed message on your Preferred Phone number that includes your specific medical information?   Yes      No Known Allergies   Current Outpatient Medications:   •  aspirin 81 mg chewable tablet, Chew daily , Disp: , Rfl:   •  docusate sodium (COLACE) 100 mg capsule, Take 1 capsule (100 mg total) by mouth 2 (two) times a day (Patient not taking: Reported on 9/10/2021), Disp: 10 capsule, Rfl: 0  •  enoxaparin (LOVENOX) 40 mg/0 4 mL, Inject 0 4 mL (40 mg total) under the skin daily (Patient not taking: Reported on 9/10/2021), Disp: 28 mL, Rfl: 0  •  gabapentin (NEURONTIN) 100 mg capsule, Take 100 mg by mouth 2 (two) times a day, Disp: , Rfl:   •  lisinopril-hydrochlorothiazide (PRINZIDE,ZESTORETIC) 20-25 MG per tablet, Take 1 tablet by mouth daily, Disp: , Rfl:   •  LUMIGAN 0 01 % ophthalmic drops, Administer into the left eye daily at bedtime , Disp: , Rfl:   •  oxyCODONE (ROXICODONE) 5 mg immediate release tablet, 1 pill po Q6 Hrs prn severe pain (Patient not taking: Reported on 9/10/2021), Disp: 20 tablet, Rfl: 0  •  triamcinolone (KENALOG) 0 1 % ointment, Apply topically twice a day for up to 14 days to active areas as needed  May repeat course after taking 1 week break  Do not use on face, groin, armpits  Do not use on open skin, Disp: 453 6 g, Rfl: 1          • Whom besides the patient is providing clinical information about today's encounter?   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)    Physical Exam:  • Anatomic Location Affected:  Forearms and shins   • Morphological Description:  Hyperpigmented and brawny appearing plaques along the shins; post inflammatory changes of the right upper extremity with lichenoid appearing papules   • Pertinent Positives:  • Pertinent Negatives: Additional History of Present Condition:  Patient here for follow up  Patient states he has been using Aveeno cream, Triamcinolone 0 1% ointment, and compression socks with improvement  Assessment and Plan:  Based on a thorough discussion of this condition and the management approach to it (including a comprehensive discussion of the known risks, side effects and potential benefits of treatment), the patient (family) agrees to implement the following specific plan:  · Patient appears to be improving clinically  In addition to ID reaction, we are also considering a diagnosis of lichenoid drug eruption   · Continue using Triamcinolone 0 1% ointment for two more weeks  · If no improvement will consider biopsy upon return from vacation        What is venous eczema? Venous eczema is a common form of eczema/dermatitis that affects one or both lower legs in association with venous insufficiency   It is also called gravitational dermatitis  Who gets venous eczema? Venous eczema is most often seen in middle-aged and elderly patients -- it is reported to affect 20% of those over 70 years  It is associated with:  • History of deep venous thrombosis in an affected limb  • History of cellulitis in an affected limb  • Chronic swelling of the lower leg, aggravated by hot weather and prolonged standing  • Varicose veins  • Venous leg ulcers  What causes venous eczema? Venous eczema appears to be due to fluid collecting in the tissues and activation of the innate immune response  Normally during walking the leg muscles pump blood upwards and valves in the veins prevent pooling  A clot in the deep leg veins (deep venous thrombosis or DVT) or varicose veins may damage the valves  As a result back pressure develops and fluid collects in the tissues  An inflammatory reaction occurs  What are the clinical features of venous eczema? Venous eczema can form discrete patches or become confluent and circumferential  Features include:  • Itchy red, blistered and crusted plaques; or dry fissured and scaly plaques on one or both lower legs  • Orange-brown macular pigmentation due to haemosiderin deposition  • Atrophie lois (white irregular scars surrounded by red spots)  • 'Champagne bottle' shape of the lower leg -- narrowing at the ankles and induration (lipodermatosclerosis)    What are the complications of venous eczema? • Impetiginisation -- secondary infection with Staphylococcus aureus resulting in yellowish crusts  • Cellulitis -- infection with Streptococcus pyogenes: there may be redness, swelling, pain, fever, a red streak up the leg and swollen nodes in the groin  • Secondary eczema -- the eczema spreads to other areas on the body  • Contact allergy to one or more components of the ointments or creams used    How is venous eczema diagnosed?   The diagnosis of venous eczema is clinical   Patch tests may be undertaken if there is suspicion of contact allergy  What is the treatment for venous eczema? Reduce swelling in the leg  • Don't stand for long periods  • Take regular walks  • Elevate your feet when sitting: if your legs are swollen they need to be above your hips to drain effectively  • Elevate the foot of your bed overnight  • During the acute phase of eczema, bandaging is important to reduce swelling  • When eczema has settled, wear graduated compression socks or stockings long term  Fitted moderate to high compression socks can be obtained from a surgical supplies company  Light compression using travel socks may be adequate, and these are easy to put on  They can be bought at pharmacies, travel and sports stores  More compression is obtained by wearing two pairs  • Horse chestnut extract appears to be of benefit for at least some patients with venous disease  Treat the eczema  • Dry up oozing patches with Condy's solution (potassium permanganate) or dilute vinegar on gauze as compresses  • Oral antibiotics such as flucloxacillin may be prescribed for a secondary infection  • Apply a prescribed topical steroid: start with a potent steroid cream applied accurately daily to the patches until they have flattened out  After a few days, change to a milder steroid cream (eg  hydrocortisone) until the itchy patches have resolved (maintenance treatment)  Check with your doctor if you are using steroid creams for more than a few weeks  Overuse can thin the skin, but short courses of stronger preparations can be used from time to time if necessary to control dermatitis  Coal tar ointment may also help  • Use a moisturizing cream frequently to keep the skin on the legs smooth and soft  If the skin is very scaly, urea cream may be especially effective    • Protect your skin from injury: this can result in infection or ulceration that may be difficult to heal     Treatment for varicose veins  • Seek the opinion of a vascular surgeon regarding varicose veins  • These can be treated surgically or sclerotherapy  • Varicose veins may develop again after an apparently successful operation because venous disease is progressive  How can venous eczema be prevented? Venous eczema cannot be completely prevented but the number and severity of flare-ups can be reduced by the following measures  • Avoid prolonged standing or sitting with legs down  • Wear compression socks or stockings  • Avoid and treat leg swelling  • Apply emollients frequently and regularly to dry skin  • Avoid soap; use water alone or non-soap cleansers when bathing    What is the outlook for venous eczema? Venous eczema tends to be a recurring or chronic disorder lifelong  Treat recurrence promptly with topical steroids      Scribe Attestation    I,:  Yonathan Lai am acting as a scribe while in the presence of the attending physician :       I,:  Jay Wheeler MD personally performed the services described in this documentation    as scribed in my presence :

## 2022-12-22 NOTE — PATIENT INSTRUCTIONS
Assessment and Plan:  Based on a thorough discussion of this condition and the management approach to it (including a comprehensive discussion of the known risks, side effects and potential benefits of treatment), the patient (family) agrees to implement the following specific plan:  Continue using Triamcinolone 0 1% ointment for two weeks  If no improvement will consider biopsy upon return from vacation        What is venous eczema? Venous eczema is a common form of eczema/dermatitis that affects one or both lower legs in association with venous insufficiency  It is also called gravitational dermatitis  Who gets venous eczema? Venous eczema is most often seen in middle-aged and elderly patients -- it is reported to affect 20% of those over 70 years  It is associated with:  History of deep venous thrombosis in an affected limb  History of cellulitis in an affected limb  Chronic swelling of the lower leg, aggravated by hot weather and prolonged standing  Varicose veins  Venous leg ulcers  What causes venous eczema? Venous eczema appears to be due to fluid collecting in the tissues and activation of the innate immune response  Normally during walking the leg muscles pump blood upwards and valves in the veins prevent pooling  A clot in the deep leg veins (deep venous thrombosis or DVT) or varicose veins may damage the valves  As a result back pressure develops and fluid collects in the tissues  An inflammatory reaction occurs  What are the clinical features of venous eczema?   Venous eczema can form discrete patches or become confluent and circumferential  Features include:  Itchy red, blistered and crusted plaques; or dry fissured and scaly plaques on one or both lower legs  Orange-brown macular pigmentation due to haemosiderin deposition  Atrophie lois (white irregular scars surrounded by red spots)  'Champagne bottle' shape of the lower leg -- narrowing at the ankles and induration (lipodermatosclerosis)    What are the complications of venous eczema? Impetiginisation -- secondary infection with Staphylococcus aureus resulting in yellowish crusts  Cellulitis -- infection with Streptococcus pyogenes: there may be redness, swelling, pain, fever, a red streak up the leg and swollen nodes in the groin  Secondary eczema -- the eczema spreads to other areas on the body  Contact allergy to one or more components of the ointments or creams used    How is venous eczema diagnosed? The diagnosis of venous eczema is clinical   Patch tests may be undertaken if there is suspicion of contact allergy  What is the treatment for venous eczema? Reduce swelling in the leg  Don't stand for long periods  Take regular walks  Elevate your feet when sitting: if your legs are swollen they need to be above your hips to drain effectively  Elevate the foot of your bed overnight  During the acute phase of eczema, bandaging is important to reduce swelling  When eczema has settled, wear graduated compression socks or stockings long term  Fitted moderate to high compression socks can be obtained from a surgical supplies company  Light compression using travel socks may be adequate, and these are easy to put on  They can be bought at pharmacies, travel and sports stores  More compression is obtained by wearing two pairs  Horse chestnut extract appears to be of benefit for at least some patients with venous disease  Treat the eczema  Dry up oozing patches with Condy's solution (potassium permanganate) or dilute vinegar on gauze as compresses  Oral antibiotics such as flucloxacillin may be prescribed for a secondary infection  Apply a prescribed topical steroid: start with a potent steroid cream applied accurately daily to the patches until they have flattened out  After a few days, change to a milder steroid cream (eg  hydrocortisone) until the itchy patches have resolved (maintenance treatment)   Check with your doctor if you are using steroid creams for more than a few weeks  Overuse can thin the skin, but short courses of stronger preparations can be used from time to time if necessary to control dermatitis  Coal tar ointment may also help  Use a moisturizing cream frequently to keep the skin on the legs smooth and soft  If the skin is very scaly, urea cream may be especially effective  Protect your skin from injury: this can result in infection or ulceration that may be difficult to heal     Treatment for varicose veins  Seek the opinion of a vascular surgeon regarding varicose veins  These can be treated surgically or sclerotherapy  Varicose veins may develop again after an apparently successful operation because venous disease is progressive  How can venous eczema be prevented? Venous eczema cannot be completely prevented but the number and severity of flare-ups can be reduced by the following measures  Avoid prolonged standing or sitting with legs down  Wear compression socks or stockings  Avoid and treat leg swelling  Apply emollients frequently and regularly to dry skin  Avoid soap; use water alone or non-soap cleansers when bathing    What is the outlook for venous eczema? Venous eczema tends to be a recurring or chronic disorder lifelong  Treat recurrence promptly with topical steroids

## 2023-03-31 ENCOUNTER — DOCUMENTATION (OUTPATIENT)
Dept: NEPHROLOGY | Facility: CLINIC | Age: 88
End: 2023-03-31

## 2023-06-26 ENCOUNTER — TELEPHONE (OUTPATIENT)
Dept: NEPHROLOGY | Facility: CLINIC | Age: 88
End: 2023-06-26

## 2023-06-26 NOTE — TELEPHONE ENCOUNTER
Spoke with pt regarding scheduling his Oct/1 YR follow up with Dr Brock [place] he asked me to call his home phone bc he was out  He said his wife would be able to schedule appt  Called home phone and LVM  1x attempt to schedule follow up

## 2023-07-14 NOTE — TELEPHONE ENCOUNTER
Called patient and left a voicemail asking to please have blood work drawn before the follow up appointment 
yes

## 2023-08-23 ENCOUNTER — APPOINTMENT (OUTPATIENT)
Dept: LAB | Facility: MEDICAL CENTER | Age: 88
End: 2023-08-23
Payer: COMMERCIAL

## 2023-11-02 DIAGNOSIS — I10 ESSENTIAL HYPERTENSION: ICD-10-CM

## 2023-11-02 DIAGNOSIS — N18.31 STAGE 3A CHRONIC KIDNEY DISEASE (HCC): Primary | ICD-10-CM

## 2023-11-07 ENCOUNTER — OFFICE VISIT (OUTPATIENT)
Dept: DERMATOLOGY | Facility: CLINIC | Age: 88
End: 2023-11-07
Payer: COMMERCIAL

## 2023-11-07 VITALS — HEIGHT: 70 IN | BODY MASS INDEX: 42.29 KG/M2 | TEMPERATURE: 98.1 F | WEIGHT: 295.4 LBS

## 2023-11-07 DIAGNOSIS — D18.01 CHERRY ANGIOMA: ICD-10-CM

## 2023-11-07 DIAGNOSIS — D22.9 MULTIPLE MELANOCYTIC NEVI: Primary | ICD-10-CM

## 2023-11-07 DIAGNOSIS — L82.1 SEBORRHEIC KERATOSIS: ICD-10-CM

## 2023-11-07 DIAGNOSIS — R21 RASH AND NONSPECIFIC SKIN ERUPTION: ICD-10-CM

## 2023-11-07 DIAGNOSIS — L85.3 XEROSIS OF SKIN: ICD-10-CM

## 2023-11-07 DIAGNOSIS — L81.4 LENTIGINES: ICD-10-CM

## 2023-11-07 PROCEDURE — 99212 OFFICE O/P EST SF 10 MIN: CPT | Performed by: DERMATOLOGY

## 2023-11-07 NOTE — PATIENT INSTRUCTIONS
RASH      Plan:  Try using compression stockings with stocking alejandre.   - Recommend sensitive skin care regimen  - detergent free of dyes and perfumes (example, free and clear). Try washing clothes with extra rinse cycle. - Short lukewarm showers  - White dove bar soap  - Recommend moisturizing whole body with Creams multiple times a day (examples: Cetaphil, CeraVe. and Eucerin)  - avoid aerosols and fragrances in the home (candles, plug ins, perfume, air freshener, etc)  For up to 2 weeks at night start using Betamethasone and cover with plastic wrap then switch to Triamcinolone and use as needed. PROCEDURES PERFORMED TODAY ASSOCIATED WITH THIS CONDITION:          NONE. MELANOCYTIC NEVI ("Moles")        Assessment and Plan:  Based on a thorough discussion of this condition and the management approach to it (including a comprehensive discussion of the known risks, side effects and potential benefits of treatment), the patient (family) agrees to implement the following specific plan:  When outside we recommend using a wide brim hat, sunglasses, long sleeve and pants, sunscreen with SPF 11+ with reapplication every 2 hours, or SPF specific clothing   Benign, reassured  Annual skin check     Melanocytic Nevi  Melanocytic nevi ("moles") are tan or brown, raised or flat areas of the skin which have an increased number of melanocytes. Melanocytes are the cells in our body which make pigment and account for skin color. Some moles are present at birth (I.e., "congenital nevi"), while others come up later in life (i.e., "acquired nevi"). The sun can stimulate the body to make more moles. Sunburns are not the only thing that triggers more moles. Chronic sun exposure can do it too. Clinically distinguishing a healthy mole from melanoma may be difficult, even for experienced dermatologists.  The "ABCDE's" of moles have been suggested as a means of helping to alert a person to a suspicious mole and the possible increased risk of melanoma. The suggestions for raising alert are as follows:    Asymmetry: Healthy moles tend to be symmetric, while melanomas are often asymmetric. Asymmetry means if you draw a line through the mole, the two halves do not match in color, size, shape, or surface texture. Asymmetry can be a result of rapid enlargement of a mole, the development of a raised area on a previously flat lesion, scaling, ulceration, bleeding or scabbing within the mole. Any mole that starts to demonstrate "asymmetry" should be examined promptly by a board certified dermatologist.     Border: Healthy moles tend to have discrete, even borders. The border of a melanoma often blends into the normal skin and does not sharply delineate the mole from normal skin. Any mole that starts to demonstrate "uneven borders" should be examined promptly by a board certified dermatologist.     Color: Healthy moles tend to be one color throughout. Melanomas tend to be made up of different colors ranging from dark black, blue, white, or red. Any mole that demonstrates a color change should be examined promptly by a board certified dermatologist.     Diameter: Healthy moles tend to be smaller than 0.6 cm in size; an exception are "congenital nevi" that can be larger. Melanomas tend to grow and can often be greater than 0.6 cm (1/4 of an inch, or the size of a pencil eraser). This is only a guideline, and many normal moles may be larger than 0.6 cm without being unhealthy. Any mole that starts to change in size (small to bigger or bigger to smaller) should be examined promptly by a board certified dermatologist.     Evolving: Healthy moles tend to "stay the same."  Melanomas may often show signs of change or evolution such as a change in size, shape, color, or elevation.   Any mole that starts to itch, bleed, crust, burn, hurt, or ulcerate or demonstrate a change or evolution should be examined promptly by a board certified dermatologist.        Tod Benitez and Plan:  Based on a thorough discussion of this condition and the management approach to it (including a comprehensive discussion of the known risks, side effects and potential benefits of treatment), the patient (family) agrees to implement the following specific plan:  When outside we recommend using a wide brim hat, sunglasses, long sleeve and pants, sunscreen with SPF 50+ with reapplication every 2 hours, or SPF specific clothing       What is a lentigo? A lentigo is a pigmented flat or slightly raised lesion with a clearly defined edge. Unlike an ephelis (freckle), it does not fade in the winter months. There are several kinds of lentigo. The name lentigo originally referred to its appearance resembling a small lentil. The plural of lentigo is lentigines, although “lentigos” is also in common use. Who gets lentigines? Lentigines can affect males and females of all ages and races. Solar lentigines are especially prevalent in fair skinned adults. Lentigines associated with syndromes are present at birth or arise during childhood. What causes lentigines? Common forms of lentigo are due to exposure to ultraviolet radiation:  Sun damage including sunburn   Indoor tanning   Phototherapy, especially photochemotherapy (PUVA)    Ionizing radiation, eg radiation therapy, can also cause lentigines. Several familial syndromes associated with widespread lentigines originate from mutations in Nathan-MAP kinase, mTOR signaling and PTEN pathways. What is the treatment for lentigines? Most lentigines are left alone. Attempts to lighten them may not be successful.  The following approaches are used:  SPF 50+ broad-spectrum sunscreen   Hydroquinone bleaching cream   Alpha hydroxy acids   Vitamin C   Retinoids   Azelaic acid   Chemical peels  Individual lesions can be permanently removed using:  Cryotherapy   Intense pulsed light   Pigment lasers    How can lentigines be prevented? Lentigines associated with exposure ultraviolet radiation can be prevented by very careful sun protection. Clothing is more successful at preventing new lentigines than are sunscreens. What is the outlook for lentigines? Lentigines usually persist. They may increase in number with age and sun exposure. Some in sun-protected sites may fade and disappear. GARCIA ANGIOMAS      Assessment and Plan:  Based on a thorough discussion of this condition and the management approach to it (including a comprehensive discussion of the known risks, side effects and potential benefits of treatment), the patient (family) agrees to implement the following specific plan:  Monitor for changes  Benign, reassured      Assessment and Plan:    Cherry angioma, also known as [place] spots, are benign vascular skin lesions. A "cherry angioma" is a firm red, blue or purple papule, 0.1-1 cm in diameter. When thrombosed, they can appear black in colour until evaluated with a dermatoscope when the red or purple colour is more easily seen. Cherry angioma may develop on any part of the body but most often appear on the scalp, face, lips and trunk. An angioma is due to proliferating endothelial cells; these are the cells that line the inside of a blood vessel. Angiomas can arise in early life or later in life; the most common type of angioma is a cherry angioma. Cherry angiomas are very common in males and females of any age or race. They are more noticeable in white skin than in skin of colour. They markedly increase in number from about the age of 36. There may be a family history of similar lesions. Eruptive cherry angiomas have been rarely reported to be associated with internal malignancy. The cause of angiomas is unknown.  Genetic analysis of cherry angiomas has shown that they frequently carry specific somatic missense mutations in the GNAQ and GNA11 (Q209H) genes, which are involved in other vascular and melanocytic proliferations. SEBORRHEIC KERATOSIS; NON-INFLAMED        Assessment and Plan:  Based on a thorough discussion of this condition and the management approach to it (including a comprehensive discussion of the known risks, side effects and potential benefits of treatment), the patient (family) agrees to implement the following specific plan:  Monitor for changes  Benign, reassured      Seborrheic Keratosis  A seborrheic keratosis is a harmless warty spot that appears during adult life as a common sign of skin aging. Seborrheic keratoses can arise on any area of skin, covered or uncovered, with the usual exception of the palms and soles. They do not arise from mucous membranes. Seborrheic keratoses can have highly variable appearance. Seborrheic keratoses are extremely common. It has been estimated that over 90% of adults over the age of 61 years have one or more of them. They occur in males and females of all races, typically beginning to erupt in the 35s or 45s. They are uncommon under the age of 21 years. The precise cause of seborrhoeic keratoses is not known. Seborrhoeic keratoses are considered degenerative in nature. As time goes by, seborrheic keratoses tend to become more numerous. Some people inherit a tendency to develop a very large number of them; some people may have hundreds of them. There is no easy way to remove multiple lesions on a single occasion. Unless a specific lesion is "inflamed" and is causing pain or stinging/burning or is bleeding, most insurance companies do not authorize treatment.     XEROSIS ("DRY SKIN")        Assessment and Plan:  Based on a thorough discussion of this condition and the management approach to it (including a comprehensive discussion of the known risks, side effects and potential benefits of treatment), the patient (family) agrees to implement the following specific plan:  Use moisturizer like Eucerin,Cerave or Aveeno Cream 3 times a day for the dry skin            Dry skin refers to skin that feels dry to touch. Dry skin has a dull surface with a rough, scaly quality. The skin is less pliable and cracked. When dryness is severe, the skin may become inflamed and fissured. Although any body site can be dry, dry skin tends to affect the shins more than any other site. Dry skin is lacking moisture in the outer horny cell layer (stratum corneum) and this results in cracks in the skin surface. Dry skin is also called xerosis, xeroderma or asteatosis (lack of fat). It can affect males and females of all ages. There is some racial variability in water and lipid content of the skin. Dry skin that starts in early childhood may be one of about 20 types of ichthyosis (fish-scale skin). There is often a family history of dry skin. Dry skin is commonly seen in people with atopic dermatitis. Nearly everyone > 60 years has dry skin. Dry skin that begins later may be seen in people with certain diseases and conditions. Postmenopausal women  Hypothyroidism  Chronic renal disease   Malnutrition and weight loss   Subclinical dermatitis   Treatment with certain drugs such as oral retinoids, diuretics and epidermal growth factor receptor inhibitors      What is the treatment for dry skin? The mainstay of treatment of dry skin and ichthyosis is moisturisers/emollients. They should be applied liberally and often enough to:  Reduce itch   Improve the barrier function   Prevent entry of irritants, bacteria   Reduce transepidermal water loss. How can dry skin be prevented? Eliminate aggravating factors:  Reduce the frequency of bathing. A humidifier in winter and air conditioner in summer   Compare having a short shower with a prolonged soak in a bath. Use lukewarm, not hot, water.    Replace standard soap with a substitute such as a synthetic detergent cleanser, water-miscible emollient, bath oil, anti-pruritic tar oil, colloidal oatmeal etc.   Apply an emollient liberally and often, particularly shortly after bathing, and when itchy. The drier the skin, the thicker this should be, especially on the hands. What is the outlook for dry skin? A tendency to dry skin may persist life-long, or it may improve once contributing factors are controlled.

## 2023-11-07 NOTE — PROGRESS NOTES
[facility] Note     Patient Name: Shaheed Caballero  Encounter Date: 11/7/23     Have you been cared for by a [facility] in the last 3 years and, if so, which description applies to you? Yes. I have been here within the last 3 years, and my medical history has NOT changed since that time. I am MALE/not capable of bearing children. REVIEW OF SYSTEMS:  Have you recently had or currently have any of the following? No changes in my recent health. PAST MEDICAL HISTORY:  Have you personally ever had or currently have any of the following? If "YES," then please provide more detail. No changes in my medical history. HISTORY OF IMMUNOSUPPRESSION: Do you have a history of any of the following:  Systemic Immunosuppression such as Diabetes, Biologic or Immunotherapy, Chemotherapy, Organ Transplantation, Bone Marrow Transplantation? No     Answering "YES" requires the addition of the dotphrase "IMMUNOSUPPRESSED" as the first diagnosis of the patient's visit. FAMILY HISTORY:  Any "first degree relatives" (parent, brother, sister, or child) with the following? No changes in my family's known health. PATIENT EXPERIENCE:    Do you want the Dermatologist to perform a COMPLETE skin exam today including a clinical examination under the "bra and underwear" areas? Yes  If necessary, do we have your permission to call and leave a detailed message on your Preferred Phone number that includes your specific medical information?   Yes      No Known Allergies   Current Outpatient Medications:     aspirin 81 mg chewable tablet, Chew daily , Disp: , Rfl:     gabapentin (NEURONTIN) 100 mg capsule, Take 100 mg by mouth 2 (two) times a day, Disp: , Rfl:     lisinopril-hydrochlorothiazide (PRINZIDE,ZESTORETIC) 20-25 MG per tablet, Take 1 tablet by mouth daily, Disp: , Rfl:     LUMIGAN 0.01 % ophthalmic drops, Administer into the left eye daily at bedtime , Disp: , Rfl:     docusate sodium (COLACE) 100 mg capsule, Take 1 capsule (100 mg total) by mouth 2 (two) times a day (Patient not taking: Reported on 9/10/2021), Disp: 10 capsule, Rfl: 0    enoxaparin (LOVENOX) 40 mg/0.4 mL, Inject 0.4 mL (40 mg total) under the skin daily (Patient not taking: Reported on 9/10/2021), Disp: 28 mL, Rfl: 0    oxyCODONE (ROXICODONE) 5 mg immediate release tablet, 1 pill po Q6 Hrs prn severe pain (Patient not taking: Reported on 9/10/2021), Disp: 20 tablet, Rfl: 0    triamcinolone (KENALOG) 0.1 % ointment, Apply topically twice a day for up to 14 days to active areas as needed. May repeat course after taking 1 week break. Do not use on face, groin, armpits. Do not use on open skin (Patient not taking: Reported on 11/7/2023), Disp: 453.6 g, Rfl: 1        Patient present for skin check, spot on left elbow right arm and legs and trunk, last time got diagnosed with stasis dermatitis and was prescribed triamcinolone but is not using anythign on spots currently. Whom besides the patient is providing clinical information about today's encounter? Other:  Tc Mariee present     Physical Exam and Assessment/Plan by Diagnosis:      RASH    Physical Exam:  (Anatomic Location); (Size and Morphological Description); (Differential Diagnosis):  Right upper arm, left elbow; eczematous, lichenified plaques Diff DXX: eczema     Pertinent Positives:  Pertinent Negatives: Additional History of Present Condition:  Present for over a year ,patient present for skin check, spot on left elbow right arm and legs and trunk, last time got diagnosed with stasis dermatitis and was prescribed triamcinolone but is not using anythign on spots currently. Plan:  Try using compression stockings with stocking alejandre.   - Recommend sensitive skin care regimen  - detergent free of dyes and perfumes (example, free and clear). Try washing clothes with extra rinse cycle.   - Short lukewarm showers  - White dove bar soap  - Recommend moisturizing whole body with Creams multiple times a day (examples: Sebastian Repine. and Eucerin)  - avoid aerosols and fragrances in the home (candles, plug ins, perfume, air freshener, etc)  For up to 2 weeks at night start using Betamethasone and cover with plastic wrap then switch to Triamcinolone and use as needed. MELANOCYTIC NEVI ("Moles")    Physical Exam:  Anatomic Location Affected:   Mostly on sun-exposed areas of the trunk and extremities  Morphological Description:  Scattered, 1-4mm round to ovoid, symmetrical-appearing, even bordered, skin colored to dark brown macules/papules, mostly in sun-exposed areas  Pertinent Positives:  Pertinent Negatives: Additional History of Present Condition:      Assessment and Plan:  Based on a thorough discussion of this condition and the management approach to it (including a comprehensive discussion of the known risks, side effects and potential benefits of treatment), the patient (family) agrees to implement the following specific plan:  When outside we recommend using a wide brim hat, sunglasses, long sleeve and pants, sunscreen with SPF 56+ with reapplication every 2 hours, or SPF specific clothing   Benign, reassured  Annual skin check     Melanocytic Nevi  Melanocytic nevi ("moles") are tan or brown, raised or flat areas of the skin which have an increased number of melanocytes. Melanocytes are the cells in our body which make pigment and account for skin color. Some moles are present at birth (I.e., "congenital nevi"), while others come up later in life (i.e., "acquired nevi"). The sun can stimulate the body to make more moles. Sunburns are not the only thing that triggers more moles. Chronic sun exposure can do it too. Clinically distinguishing a healthy mole from melanoma may be difficult, even for experienced dermatologists.  The "ABCDE's" of moles have been suggested as a means of helping to alert a person to a suspicious mole and the possible increased risk of melanoma. The suggestions for raising alert are as follows:    Asymmetry: Healthy moles tend to be symmetric, while melanomas are often asymmetric. Asymmetry means if you draw a line through the mole, the two halves do not match in color, size, shape, or surface texture. Asymmetry can be a result of rapid enlargement of a mole, the development of a raised area on a previously flat lesion, scaling, ulceration, bleeding or scabbing within the mole. Any mole that starts to demonstrate "asymmetry" should be examined promptly by a board certified dermatologist.     Border: Healthy moles tend to have discrete, even borders. The border of a melanoma often blends into the normal skin and does not sharply delineate the mole from normal skin. Any mole that starts to demonstrate "uneven borders" should be examined promptly by a board certified dermatologist.     Color: Healthy moles tend to be one color throughout. Melanomas tend to be made up of different colors ranging from dark black, blue, white, or red. Any mole that demonstrates a color change should be examined promptly by a board certified dermatologist.     Diameter: Healthy moles tend to be smaller than 0.6 cm in size; an exception are "congenital nevi" that can be larger. Melanomas tend to grow and can often be greater than 0.6 cm (1/4 of an inch, or the size of a pencil eraser). This is only a guideline, and many normal moles may be larger than 0.6 cm without being unhealthy. Any mole that starts to change in size (small to bigger or bigger to smaller) should be examined promptly by a board certified dermatologist.     Evolving: Healthy moles tend to "stay the same."  Melanomas may often show signs of change or evolution such as a change in size, shape, color, or elevation.   Any mole that starts to itch, bleed, crust, burn, hurt, or ulcerate or demonstrate a change or evolution should be examined promptly by a board certified dermatologist. LENTIGO    Physical Exam:  Anatomic Location Affected:  trunk, arms  Morphological Description:  Light brown macules  Pertinent Positives:  Pertinent Negatives: Additional History of Present Condition:      Assessment and Plan:  Based on a thorough discussion of this condition and the management approach to it (including a comprehensive discussion of the known risks, side effects and potential benefits of treatment), the patient (family) agrees to implement the following specific plan:  When outside we recommend using a wide brim hat, sunglasses, long sleeve and pants, sunscreen with SPF 29+ with reapplication every 2 hours, or SPF specific clothing       What is a lentigo? A lentigo is a pigmented flat or slightly raised lesion with a clearly defined edge. Unlike an ephelis (freckle), it does not fade in the winter months. There are several kinds of lentigo. The name lentigo originally referred to its appearance resembling a small lentil. The plural of lentigo is lentigines, although “lentigos” is also in common use. Who gets lentigines? Lentigines can affect males and females of all ages and races. Solar lentigines are especially prevalent in fair skinned adults. Lentigines associated with syndromes are present at birth or arise during childhood. What causes lentigines? Common forms of lentigo are due to exposure to ultraviolet radiation:  Sun damage including sunburn   Indoor tanning   Phototherapy, especially photochemotherapy (PUVA)    Ionizing radiation, eg radiation therapy, can also cause lentigines. Several familial syndromes associated with widespread lentigines originate from mutations in Nathan-MAP kinase, mTOR signaling and PTEN pathways. What is the treatment for lentigines? Most lentigines are left alone. Attempts to lighten them may not be successful.  The following approaches are used:  SPF 50+ broad-spectrum sunscreen   Hydroquinone bleaching cream   Alpha hydroxy acids Vitamin C   Retinoids   Azelaic acid   Chemical peels  Individual lesions can be permanently removed using:  Cryotherapy   Intense pulsed light   Pigment lasers    How can lentigines be prevented? Lentigines associated with exposure ultraviolet radiation can be prevented by very careful sun protection. Clothing is more successful at preventing new lentigines than are sunscreens. What is the outlook for lentigines? Lentigines usually persist. They may increase in number with age and sun exposure. Some in sun-protected sites may fade and disappear. CHERRY ANGIOMAS    Physical Exam:  Anatomic Location Affected:  trunk  Morphological Description:  Scattered cherry red, 1-4 mm papules. Pertinent Positives:  Pertinent Negatives: Additional History of Present Condition:      Assessment and Plan:  Based on a thorough discussion of this condition and the management approach to it (including a comprehensive discussion of the known risks, side effects and potential benefits of treatment), the patient (family) agrees to implement the following specific plan:  Monitor for changes  Benign, reassured      Assessment and Plan:    Cherry angioma, also known as Parra Apo spots, are benign vascular skin lesions. A "cherry angioma" is a firm red, blue or purple papule, 0.1-1 cm in diameter. When thrombosed, they can appear black in colour until evaluated with a dermatoscope when the red or purple colour is more easily seen. Cherry angioma may develop on any part of the body but most often appear on the scalp, face, lips and trunk. An angioma is due to proliferating endothelial cells; these are the cells that line the inside of a blood vessel. Angiomas can arise in early life or later in life; the most common type of angioma is a cherry angioma. Cherry angiomas are very common in males and females of any age or race. They are more noticeable in white skin than in skin of colour.  They markedly increase in number from about the age of 36. There may be a family history of similar lesions. Eruptive cherry angiomas have been rarely reported to be associated with internal malignancy. The cause of angiomas is unknown. Genetic analysis of cherry angiomas has shown that they frequently carry specific somatic missense mutations in the GNAQ and GNA11 (Q209H) genes, which are involved in other vascular and melanocytic proliferations. SEBORRHEIC KERATOSIS; NON-INFLAMED    Physical Exam:  Anatomic Location Affected:  trunk  Morphological Description:  Flat and raised, waxy, smooth to warty textured, yellow to brownish-grey to dark brown to blackish, discrete, "stuck-on" appearing papules. Pertinent Positives:  Pertinent Negatives: Additional History of Present Condition:      Assessment and Plan:  Based on a thorough discussion of this condition and the management approach to it (including a comprehensive discussion of the known risks, side effects and potential benefits of treatment), the patient (family) agrees to implement the following specific plan:  Monitor for changes  Benign, reassured      Seborrheic Keratosis  A seborrheic keratosis is a harmless warty spot that appears during adult life as a common sign of skin aging. Seborrheic keratoses can arise on any area of skin, covered or uncovered, with the usual exception of the palms and soles. They do not arise from mucous membranes. Seborrheic keratoses can have highly variable appearance. Seborrheic keratoses are extremely common. It has been estimated that over 90% of adults over the age of 61 years have one or more of them. They occur in males and females of all races, typically beginning to erupt in the 35s or 45s. They are uncommon under the age of 21 years. The precise cause of seborrhoeic keratoses is not known. Seborrhoeic keratoses are considered degenerative in nature. As time goes by, seborrheic keratoses tend to become more numerous.  Some people inherit a tendency to develop a very large number of them; some people may have hundreds of them. There is no easy way to remove multiple lesions on a single occasion. Unless a specific lesion is "inflamed" and is causing pain or stinging/burning or is bleeding, most insurance companies do not authorize treatment. XEROSIS ("DRY SKIN")    Physical Exam:  Anatomic Location Affected:  diffuse  Morphological Description:  xerosis  Pertinent Positives:  Pertinent Negatives: Additional History of Present Condition:      Assessment and Plan:  Based on a thorough discussion of this condition and the management approach to it (including a comprehensive discussion of the known risks, side effects and potential benefits of treatment), the patient (family) agrees to implement the following specific plan:  Use moisturizer like Eucerin,Cerave or Aveeno Cream 3 times a day for the dry skin            Dry skin refers to skin that feels dry to touch. Dry skin has a dull surface with a rough, scaly quality. The skin is less pliable and cracked. When dryness is severe, the skin may become inflamed and fissured. Although any body site can be dry, dry skin tends to affect the shins more than any other site. Dry skin is lacking moisture in the outer horny cell layer (stratum corneum) and this results in cracks in the skin surface. Dry skin is also called xerosis, xeroderma or asteatosis (lack of fat). It can affect males and females of all ages. There is some racial variability in water and lipid content of the skin. Dry skin that starts in early childhood may be one of about 20 types of ichthyosis (fish-scale skin). There is often a family history of dry skin. Dry skin is commonly seen in people with atopic dermatitis. Nearly everyone > 60 years has dry skin. Dry skin that begins later may be seen in people with certain diseases and conditions.   Postmenopausal women  Hypothyroidism  Chronic renal disease Malnutrition and weight loss   Subclinical dermatitis   Treatment with certain drugs such as oral retinoids, diuretics and epidermal growth factor receptor inhibitors      What is the treatment for dry skin? The mainstay of treatment of dry skin and ichthyosis is moisturisers/emollients. They should be applied liberally and often enough to:  Reduce itch   Improve the barrier function   Prevent entry of irritants, bacteria   Reduce transepidermal water loss. How can dry skin be prevented? Eliminate aggravating factors:  Reduce the frequency of bathing. A humidifier in winter and air conditioner in summer   Compare having a short shower with a prolonged soak in a bath. Use lukewarm, not hot, water. Replace standard soap with a substitute such as a synthetic detergent cleanser, water-miscible emollient, bath oil, anti-pruritic tar oil, colloidal oatmeal etc.   Apply an emollient liberally and often, particularly shortly after bathing, and when itchy. The drier the skin, the thicker this should be, especially on the hands. What is the outlook for dry skin? A tendency to dry skin may persist life-long, or it may improve once contributing factors are controlled.        Scribe Attestation      I,:  Jed Echeverria am acting as a scribe while in the presence of the attending physician.:       I,:  Lou Reynoso MD personally performed the services described in this documentation    as scribed in my presence.:

## 2023-11-08 ENCOUNTER — TELEPHONE (OUTPATIENT)
Dept: NEPHROLOGY | Facility: CLINIC | Age: 88
End: 2023-11-08

## 2023-11-08 NOTE — TELEPHONE ENCOUNTER
Called patient reminding to please complete labwork prior to 11/15 appointment with Dr. Dami Coreas.

## 2023-11-10 NOTE — TELEPHONE ENCOUNTER
Left voicemail for the patient reminding to please complete labwork prior to 11/15 appointment with Dr. Shikha Urbina. Advised patient to call back with any questions or  Concerns.

## 2023-11-15 ENCOUNTER — OFFICE VISIT (OUTPATIENT)
Dept: NEPHROLOGY | Facility: CLINIC | Age: 88
End: 2023-11-15
Payer: COMMERCIAL

## 2023-11-15 VITALS
BODY MASS INDEX: 42.23 KG/M2 | DIASTOLIC BLOOD PRESSURE: 64 MMHG | WEIGHT: 295 LBS | SYSTOLIC BLOOD PRESSURE: 122 MMHG | HEIGHT: 70 IN

## 2023-11-15 DIAGNOSIS — N18.31 STAGE 3A CHRONIC KIDNEY DISEASE (HCC): Primary | ICD-10-CM

## 2023-11-15 DIAGNOSIS — I10 ESSENTIAL HYPERTENSION: ICD-10-CM

## 2023-11-15 DIAGNOSIS — E66.01 CLASS 3 SEVERE OBESITY DUE TO EXCESS CALORIES WITHOUT SERIOUS COMORBIDITY WITH BODY MASS INDEX (BMI) OF 40.0 TO 44.9 IN ADULT (HCC): ICD-10-CM

## 2023-11-15 PROCEDURE — 99214 OFFICE O/P EST MOD 30 MIN: CPT | Performed by: INTERNAL MEDICINE

## 2023-11-15 NOTE — LETTER
November 15, 2023     [address] [address]    Patient: Gwendolyn Schmid   YOB: 1935   Date of Visit: 11/15/2023       Dear Dr. Yuniel Gamez: Thank you for referring Gwendolyn Schmid to me for evaluation. Below are my notes for this consultation. If you have questions, please do not hesitate to call me. I look forward to following your patient along with you. Sincerely,        George Santa MD        CC: No Recipients    George Santa MD  11/15/2023  3:14 PM  Sign when Signing Visit  OFFICE FOLLOW UP - Nephrology   Gwendolyn Scmhid 82 y.o. male MRN: [id number]       ASSESSMENT and PLAN:  Javier You was seen today for follow-up and chronic kidney disease. Diagnoses and all orders for this visit:    Stage 3a chronic kidney disease ([address])    Essential hypertension    Class 3 severe obesity due to excess calories without serious comorbidity with body mass index (BMI) of 40.0 to 44.9 in adult [facility])        This is an 70-year-old gentleman with history hypertension, morbid obesity, osteoarthritis of the right knee, chronic kidney disease with creatinine fluctuating around 1.2-1.3 since 2018, who was initially seen for CKD evaluation prior to right knee arthroplasty 06/03/2021. Last time patient was seen was 10/2022, today returns to the office for CKD follow-up    1. Chronic kidney disease stage 3 with previous creatinine around 1.2-1.35, noted since 2022, creatinine around 1.5-1.6 range    Chronic kidney disease suspected secondary to hypertension, morbid obesity as well as age-related nephron loss. Last creatinine 1.58 on 08/2023. Plan for repeat blood and urine test now, if renal function remains stable I would like to see him back in the office in 1 year. Blood pressure today is well controlled. He lost 4 pounds since last office visit.   Once again we discussed about importance to stay well-hydrated, follow low-salt diet, keep working on losing weight and avoid NSAIDs. 2. Hypertension, blood pressure well controlled on current regimen   Advised to follow low-salt diet and to keep working on losing weight. 3. Osteoarthrosis, status post right knee arthroplasty on 06/17  Doing well. Advised to avoid NSAIDs    4. Morbid obesity, advised to continue losing weight. Noted he lost 4 pounds since last office visit    Patient Instructions   As we discussed in the office visit, I would like you to go for repeat blood and urine test, we will contact you with the results. Remember to follow low-salt diet less than 2 g of salt in a day. Keep working on losing weight, recommend to decrease portion size of your meals, avoiding snacking, increase physical activity as tolerated. Remember to avoid NSAIDs (no ibuprofen, Motrin, Advil, Aleve, naproxen). Okay to take Tylenol or acetaminophen as needed for pain. Close follow-up with your primary care doctor        HPI: Sharath Romero is a 82 y.o. male who is here for Follow-up and Chronic Kidney Disease  . This is a patient history of chronic kidney disease, hypertension, obesity, osteoarthrosis post right knee arthroplasty on 06/16, who returns to the office for CKD follow-up    Last time patient was seen was on 10/2022. Today returns to the office for year follow-up with his wife. Patient in general is doing well, denies significant complaints other than neuropathy. Patient denies any chest pain, shortness of breath, no leg swelling. Denies any abdominal pain, no nausea, vomiting, no diarrhea or constipation. Denies any urinary problems, no burning sensation or gross hematuria reports seldom episode of urinary incontinence every couple of weeks. The last blood work was done on 08/23/2023, which we have reviewed together. Most recent creatinine 1.58, eGFR 39. ROS: All the systems were reviewed and were negative except as documented on the H&P.         Allergies: Patient has no known allergies. Medications:   Current Outpatient Medications:   •  gabapentin (NEURONTIN) 100 mg capsule, Take 100 mg by mouth 2 (two) times a day, Disp: , Rfl:   •  lisinopril-hydrochlorothiazide (PRINZIDE,ZESTORETIC) 20-25 MG per tablet, Take 1 tablet by mouth daily, Disp: , Rfl:   •  LUMIGAN 0.01 % ophthalmic drops, Administer into the left eye daily at bedtime , Disp: , Rfl:   •  aspirin 81 mg chewable tablet, Chew daily  (Patient not taking: Reported on 11/15/2023), Disp: , Rfl:   •  docusate sodium (COLACE) 100 mg capsule, Take 1 capsule (100 mg total) by mouth 2 (two) times a day (Patient not taking: Reported on 9/10/2021), Disp: 10 capsule, Rfl: 0  •  enoxaparin (LOVENOX) 40 mg/0.4 mL, Inject 0.4 mL (40 mg total) under the skin daily (Patient not taking: Reported on 9/10/2021), Disp: 28 mL, Rfl: 0  •  oxyCODONE (ROXICODONE) 5 mg immediate release tablet, 1 pill po Q6 Hrs prn severe pain (Patient not taking: Reported on 9/10/2021), Disp: 20 tablet, Rfl: 0  •  triamcinolone (KENALOG) 0.1 % ointment, Apply topically twice a day for up to 14 days to active areas as needed. May repeat course after taking 1 week break. Do not use on face, groin, armpits. Do not use on open skin (Patient not taking: Reported on 11/7/2023), Disp: 453.6 g, Rfl: 1    Past Medical History:   Diagnosis Date   • Chronic kidney disease    • Hypertension    • Sleep apnea     no CPAP      Past Surgical History:   Procedure Laterality Date   • CARPAL TUNNEL RELEASE     • COLONOSCOPY     • [place] ARTHROCENTESIS ASPIR&/INJ MAJOR JT/BURSA W/O US  6/14/2018    Procedure: ASPIRATION RIGHT KNEE BURSA;   Surgeon: Abdias Barrios MD;  [facility];  Service: Orthopedics   • [place] ARTHRP KNE CONDYLE&PLATU MEDIAL&LAT COMPARTMENTS Left 6/14/2018    Procedure: ARTHROPLASTY KNEE TOTAL;  Surgeon: Abdias Barrios MD;  [facility];  Service: Orthopedics   • [place] ARTHRP KNE CONDYLE&PLATU MEDIAL&LAT COMPARTMENTS Right 6/16/2021    Procedure: ARTHROPLASTY KNEE TOTAL;  Surgeon: Aguila Helton MD;  [facility];  Service: Orthopedics     Family History   Problem Relation Age of Onset   • No Known Problems Father       reports that he has quit smoking. He has never used smokeless tobacco. He reports that he does not currently use alcohol. He reports that he does not use drugs. Physical Exam:   Vitals:    11/15/23 1448   BP: 122/64   BP Location: Left arm   Patient Position: Sitting   Cuff Size: Large   Weight: 134 kg (295 lb)   Height: 5' 9.5" (1.765 m)     Body mass index is 42.94 kg/m². General: Morbid obese, conscious, cooperative, in not acute distress  Eyes: conjunctivae pink, anicteric sclerae  ENT: lips and mucous membranes moist  Neck: supple, no JVD  Chest: clear breath sounds bilateral, no crackles, ronchus or wheezings  CVS: distinct S1 & S2, normal rate, regular rhythm  Abdomen: obese, non-tender, non-distended, normoactive bowel sounds  Back: no CVA tenderness  Extremities: no edema of both legs  Skin: Chronic skin changes in right lower extremity  Neuro: awake, alert, oriented          Portions of the record may have been created with voice recognition software. Occasional wrong word or "sound a like" substitutions may have occurred due to the inherent limitations of voice recognition software. Read the chart carefully and recognize, using context, where substitutions have occurred. If you have any questions, please contact the dictating provider.

## 2023-11-15 NOTE — PROGRESS NOTES
OFFICE FOLLOW UP - Nephrology   Madhavi Artist 82 y.o. male MRN: [id number]       ASSESSMENT and PLAN:  Isabelle Rg was seen today for follow-up and chronic kidney disease. Diagnoses and all orders for this visit:    Stage 3a chronic kidney disease ([address])    Essential hypertension    Class 3 severe obesity due to excess calories without serious comorbidity with body mass index (BMI) of 40.0 to 44.9 in adult [facility])        This is an 57-year-old gentleman with history hypertension, morbid obesity, osteoarthritis of the right knee, chronic kidney disease with creatinine fluctuating around 1.2-1.3 since 2018, who was initially seen for CKD evaluation prior to right knee arthroplasty 06/03/2021. Last time patient was seen was 10/2022, today returns to the office for CKD follow-up    1. Chronic kidney disease stage 3 with previous creatinine around 1.2-1.35, noted since 2022, creatinine around 1.5-1.6 range    Chronic kidney disease suspected secondary to hypertension, morbid obesity as well as age-related nephron loss. Last creatinine 1.58 on 08/2023. Plan for repeat blood and urine test now, if renal function remains stable I would like to see him back in the office in 1 year. Blood pressure today is well controlled. He lost 4 pounds since last office visit. Once again we discussed about importance to stay well-hydrated, follow low-salt diet, keep working on losing weight and avoid NSAIDs. 2. Hypertension, blood pressure well controlled on current regimen   Advised to follow low-salt diet and to keep working on losing weight. 3. Osteoarthrosis, status post right knee arthroplasty on 06/17  Doing well. Advised to avoid NSAIDs    4. Morbid obesity, advised to continue losing weight. Noted he lost 4 pounds since last office visit    Patient Instructions   As we discussed in the office visit, I would like you to go for repeat blood and urine test, we will contact you with the results.   Remember to follow low-salt diet less than 2 g of salt in a day. Keep working on losing weight, recommend to decrease portion size of your meals, avoiding snacking, increase physical activity as tolerated. Remember to avoid NSAIDs (no ibuprofen, Motrin, Advil, Aleve, naproxen). Okay to take Tylenol or acetaminophen as needed for pain. Close follow-up with your primary care doctor        HPI: Azul Alanis is a 82 y.o. male who is here for Follow-up and Chronic Kidney Disease  . This is a patient history of chronic kidney disease, hypertension, obesity, osteoarthrosis post right knee arthroplasty on 06/16, who returns to the office for CKD follow-up    Last time patient was seen was on 10/2022. Today returns to the office for year follow-up with his wife. Patient in general is doing well, denies significant complaints other than neuropathy. Patient denies any chest pain, shortness of breath, no leg swelling. Denies any abdominal pain, no nausea, vomiting, no diarrhea or constipation. Denies any urinary problems, no burning sensation or gross hematuria reports seldom episode of urinary incontinence every couple of weeks. The last blood work was done on 08/23/2023, which we have reviewed together. Most recent creatinine 1.58, eGFR 39. ROS: All the systems were reviewed and were negative except as documented on the H&P. Allergies: Patient has no known allergies.     Medications:   Current Outpatient Medications:     gabapentin (NEURONTIN) 100 mg capsule, Take 100 mg by mouth 2 (two) times a day, Disp: , Rfl:     lisinopril-hydrochlorothiazide (PRINZIDE,ZESTORETIC) 20-25 MG per tablet, Take 1 tablet by mouth daily, Disp: , Rfl:     LUMIGAN 0.01 % ophthalmic drops, Administer into the left eye daily at bedtime , Disp: , Rfl:     aspirin 81 mg chewable tablet, Chew daily  (Patient not taking: Reported on 11/15/2023), Disp: , Rfl:     docusate sodium (COLACE) 100 mg capsule, Take 1 capsule (100 mg total) by mouth 2 (two) times a day (Patient not taking: Reported on 9/10/2021), Disp: 10 capsule, Rfl: 0    enoxaparin (LOVENOX) 40 mg/0.4 mL, Inject 0.4 mL (40 mg total) under the skin daily (Patient not taking: Reported on 9/10/2021), Disp: 28 mL, Rfl: 0    oxyCODONE (ROXICODONE) 5 mg immediate release tablet, 1 pill po Q6 Hrs prn severe pain (Patient not taking: Reported on 9/10/2021), Disp: 20 tablet, Rfl: 0    triamcinolone (KENALOG) 0.1 % ointment, Apply topically twice a day for up to 14 days to active areas as needed. May repeat course after taking 1 week break. Do not use on face, groin, armpits. Do not use on open skin (Patient not taking: Reported on 11/7/2023), Disp: 453.6 g, Rfl: 1    Past Medical History:   Diagnosis Date    Chronic kidney disease     Hypertension     Sleep apnea     no CPAP      Past Surgical History:   Procedure Laterality Date    CARPAL TUNNEL RELEASE      COLONOSCOPY      [place] ARTHROCENTESIS ASPIR&/INJ MAJOR JT/BURSA W/O US  6/14/2018    Procedure: ASPIRATION RIGHT KNEE BURSA; Surgeon: Camila King MD;  [facility];  Service: Orthopedics    [place] ARTHRP KNE CONDYLE&PLATU MEDIAL&LAT COMPARTMENTS Left 6/14/2018    Procedure: ARTHROPLASTY KNEE TOTAL;  Surgeon: Camila King MD;  [facility];  Service: Orthopedics    [place] ARTHRP KNE CONDYLE&PLATU MEDIAL&LAT COMPARTMENTS Right 6/16/2021    Procedure: ARTHROPLASTY KNEE TOTAL;  Surgeon: Camila King MD;  [facility];  Service: Orthopedics     Family History   Problem Relation Age of Onset    No Known Problems Father       reports that he has quit smoking. He has never used smokeless tobacco. He reports that he does not currently use alcohol. He reports that he does not use drugs. Physical Exam:   Vitals:    11/15/23 1448   BP: 122/64   BP Location: Left arm   Patient Position: Sitting   Cuff Size: Large   Weight: 134 kg (295 lb)   Height: 5' 9.5" (1.765 m)     Body mass index is 42.94 kg/m².     General: Morbid obese, conscious, cooperative, in not acute distress  Eyes: conjunctivae pink, anicteric sclerae  ENT: lips and mucous membranes moist  Neck: supple, no JVD  Chest: clear breath sounds bilateral, no crackles, ronchus or wheezings  CVS: distinct S1 & S2, normal rate, regular rhythm  Abdomen: obese, non-tender, non-distended, normoactive bowel sounds  Back: no CVA tenderness  Extremities: no edema of both legs  Skin: Chronic skin changes in right lower extremity  Neuro: awake, alert, oriented          Portions of the record may have been created with voice recognition software. Occasional wrong word or "sound a like" substitutions may have occurred due to the inherent limitations of voice recognition software. Read the chart carefully and recognize, using context, where substitutions have occurred. If you have any questions, please contact the dictating provider.

## 2023-11-15 NOTE — PATIENT INSTRUCTIONS
As we discussed in the office visit, I would like you to go for repeat blood and urine test, we will contact you with the results. Remember to follow low-salt diet less than 2 g of salt in a day. Keep working on losing weight, recommend to decrease portion size of your meals, avoiding snacking, increase physical activity as tolerated. Remember to avoid NSAIDs (no ibuprofen, Motrin, Advil, Aleve, naproxen). Okay to take Tylenol or acetaminophen as needed for pain.   Close follow-up with your primary care doctor

## 2023-11-16 ENCOUNTER — APPOINTMENT (OUTPATIENT)
Dept: LAB | Facility: MEDICAL CENTER | Age: 88
End: 2023-11-16
Payer: COMMERCIAL

## 2023-11-16 LAB
ALBUMIN SERPL BCP-MCNC: 4.4 G/DL (ref 3.5–5)
ANION GAP SERPL CALCULATED.3IONS-SCNC: 12 MMOL/L
BACTERIA UR QL AUTO: ABNORMAL /HPF
BILIRUB UR QL STRIP: NEGATIVE
BUN SERPL-MCNC: 25 MG/DL (ref 5–25)
CALCIUM SERPL-MCNC: 10.4 MG/DL (ref 8.4–10.2)
CHLORIDE SERPL-SCNC: 102 MMOL/L (ref 96–108)
CLARITY UR: CLEAR
CO2 SERPL-SCNC: 27 MMOL/L (ref 21–32)
COLOR UR: ABNORMAL
CREAT SERPL-MCNC: 1.42 MG/DL (ref 0.6–1.3)
CREAT UR-MCNC: 129.5 MG/DL
GFR SERPL CREATININE-BSD FRML MDRD: 44 ML/MIN/1.73SQ M
GLUCOSE P FAST SERPL-MCNC: 93 MG/DL (ref 65–99)
GLUCOSE UR STRIP-MCNC: NEGATIVE MG/DL
HGB UR QL STRIP.AUTO: NEGATIVE
KETONES UR STRIP-MCNC: NEGATIVE MG/DL
LEUKOCYTE ESTERASE UR QL STRIP: NEGATIVE
MUCOUS THREADS UR QL AUTO: ABNORMAL
NITRITE UR QL STRIP: NEGATIVE
NON-SQ EPI CELLS URNS QL MICRO: ABNORMAL /HPF
PH UR STRIP.AUTO: 5.5 [PH]
PHOSPHATE SERPL-MCNC: 3.5 MG/DL (ref 2.3–4.1)
POTASSIUM SERPL-SCNC: 4.2 MMOL/L (ref 3.5–5.3)
PROT UR STRIP-MCNC: NEGATIVE MG/DL
PROT UR-MCNC: 5 MG/DL
PROT/CREAT UR: 0.04 MG/G{CREAT} (ref 0–0.1)
PTH-INTACT SERPL-MCNC: 116.3 PG/ML (ref 12–88)
RBC #/AREA URNS AUTO: ABNORMAL /HPF
SODIUM SERPL-SCNC: 141 MMOL/L (ref 135–147)
SP GR UR STRIP.AUTO: 1.02 (ref 1–1.03)
UROBILINOGEN UR STRIP-ACNC: <2 MG/DL
WBC #/AREA URNS AUTO: ABNORMAL /HPF

## 2023-11-17 ENCOUNTER — TELEPHONE (OUTPATIENT)
Dept: NEPHROLOGY | Facility: CLINIC | Age: 88
End: 2023-11-17

## 2023-11-17 DIAGNOSIS — I10 ESSENTIAL HYPERTENSION: Primary | ICD-10-CM

## 2023-11-17 DIAGNOSIS — N18.31 STAGE 3A CHRONIC KIDNEY DISEASE (HCC): ICD-10-CM

## 2023-11-17 NOTE — TELEPHONE ENCOUNTER
Recent labs reviewed, renal function stable, urine test bland and unremarkable. Please contact patient tell him labs are stable.   Plan to follow up in 1 year with repeat labs before next visit (please mail orders for CBC, RFP, PTH and UPC before next visit)  Thanks,

## 2023-11-17 NOTE — TELEPHONE ENCOUNTER
Received a call from patient's spouse Mary Engle stating that they received the message. No further questions at this time.

## 2023-11-17 NOTE — TELEPHONE ENCOUNTER
Called patient and left a voicemail with the above message. Advised patient to please call the office to let us know he received the message and if have any other questions or concerns. Labs have been ordered and mailed to patient home address.

## 2023-11-28 ENCOUNTER — HOSPITAL ENCOUNTER (EMERGENCY)
Facility: HOSPITAL | Age: 88
Discharge: HOME | End: 2023-11-28
Attending: STUDENT IN AN ORGANIZED HEALTH CARE EDUCATION/TRAINING PROGRAM
Payer: COMMERCIAL

## 2023-11-28 ENCOUNTER — APPOINTMENT (EMERGENCY)
Dept: CT IMAGING | Facility: HOSPITAL | Age: 88
End: 2023-11-28
Payer: COMMERCIAL

## 2023-11-28 VITALS
DIASTOLIC BLOOD PRESSURE: 76 MMHG | SYSTOLIC BLOOD PRESSURE: 125 MMHG | RESPIRATION RATE: 18 BRPM | OXYGEN SATURATION: 91 % | HEART RATE: 76 BPM | TEMPERATURE: 98.1 F

## 2023-11-28 DIAGNOSIS — H02.409 PTOSIS: Primary | ICD-10-CM

## 2023-11-28 LAB
ALBUMIN SERPL BCP-MCNC: 4.4 G/DL (ref 3.5–5)
ALP SERPL-CCNC: 68 U/L (ref 34–104)
ALT SERPL W P-5'-P-CCNC: 12 U/L (ref 7–52)
ANION GAP SERPL CALCULATED.3IONS-SCNC: 9 MMOL/L
AST SERPL W P-5'-P-CCNC: 14 U/L (ref 13–39)
B BURGDOR IGG+IGM SER QL IA: NEGATIVE
BASOPHILS # BLD AUTO: 0.05 THOUSANDS/ÂΜL (ref 0–0.1)
BASOPHILS NFR BLD AUTO: 1 % (ref 0–1)
BILIRUB SERPL-MCNC: 0.81 MG/DL (ref 0.2–1)
BUN SERPL-MCNC: 30 MG/DL (ref 5–25)
CALCIUM SERPL-MCNC: 10 MG/DL (ref 8.4–10.2)
CHLORIDE SERPL-SCNC: 103 MMOL/L (ref 96–108)
CO2 SERPL-SCNC: 27 MMOL/L (ref 21–32)
CREAT SERPL-MCNC: 1.51 MG/DL (ref 0.6–1.3)
EOSINOPHIL # BLD AUTO: 0.18 THOUSAND/ÂΜL (ref 0–0.61)
EOSINOPHIL NFR BLD AUTO: 3 % (ref 0–6)
ERYTHROCYTE [DISTWIDTH] IN BLOOD BY AUTOMATED COUNT: 12.6 % (ref 11.6–15.1)
GFR SERPL CREATININE-BSD FRML MDRD: 41 ML/MIN/1.73SQ M
GLUCOSE SERPL-MCNC: 100 MG/DL (ref 65–140)
HCT VFR BLD AUTO: 46.1 % (ref 36.5–49.3)
HGB BLD-MCNC: 15.2 G/DL (ref 12–17)
IMM GRANULOCYTES # BLD AUTO: 0.02 THOUSAND/UL (ref 0–0.2)
IMM GRANULOCYTES NFR BLD AUTO: 0 % (ref 0–2)
LYMPHOCYTES # BLD AUTO: 1.47 THOUSANDS/ÂΜL (ref 0.6–4.47)
LYMPHOCYTES NFR BLD AUTO: 25 % (ref 14–44)
MCH RBC QN AUTO: 33.3 PG (ref 26.8–34.3)
MCHC RBC AUTO-ENTMCNC: 33 G/DL (ref 31.4–37.4)
MCV RBC AUTO: 101 FL (ref 82–98)
MONOCYTES # BLD AUTO: 0.52 THOUSAND/ÂΜL (ref 0.17–1.22)
MONOCYTES NFR BLD AUTO: 9 % (ref 4–12)
NEUTROPHILS # BLD AUTO: 3.72 THOUSANDS/ÂΜL (ref 1.85–7.62)
NEUTS SEG NFR BLD AUTO: 62 % (ref 43–75)
NRBC BLD AUTO-RTO: 0 /100 WBCS
PLATELET # BLD AUTO: 194 THOUSANDS/UL (ref 149–390)
PMV BLD AUTO: 9.3 FL (ref 8.9–12.7)
POTASSIUM SERPL-SCNC: 3.9 MMOL/L (ref 3.5–5.3)
PROT SERPL-MCNC: 7.6 G/DL (ref 6.4–8.4)
RBC # BLD AUTO: 4.57 MILLION/UL (ref 3.88–5.62)
SODIUM SERPL-SCNC: 139 MMOL/L (ref 135–147)
WBC # BLD AUTO: 5.96 THOUSAND/UL (ref 4.31–10.16)

## 2023-11-28 PROCEDURE — 85025 COMPLETE CBC W/AUTO DIFF WBC: CPT

## 2023-11-28 PROCEDURE — 99284 EMERGENCY DEPT VISIT MOD MDM: CPT

## 2023-11-28 PROCEDURE — 36415 COLL VENOUS BLD VENIPUNCTURE: CPT

## 2023-11-28 PROCEDURE — 70498 CT ANGIOGRAPHY NECK: CPT

## 2023-11-28 PROCEDURE — G1004 CDSM NDSC: HCPCS

## 2023-11-28 PROCEDURE — 96361 HYDRATE IV INFUSION ADD-ON: CPT

## 2023-11-28 PROCEDURE — 86618 LYME DISEASE ANTIBODY: CPT

## 2023-11-28 PROCEDURE — 80053 COMPREHEN METABOLIC PANEL: CPT

## 2023-11-28 PROCEDURE — 99285 EMERGENCY DEPT VISIT HI MDM: CPT | Performed by: STUDENT IN AN ORGANIZED HEALTH CARE EDUCATION/TRAINING PROGRAM

## 2023-11-28 PROCEDURE — 96360 HYDRATION IV INFUSION INIT: CPT

## 2023-11-28 PROCEDURE — 70496 CT ANGIOGRAPHY HEAD: CPT

## 2023-11-28 RX ADMIN — SODIUM CHLORIDE 1000 ML: 0.9 INJECTION, SOLUTION INTRAVENOUS at 16:35

## 2023-11-28 RX ADMIN — IOHEXOL 85 ML: 350 INJECTION, SOLUTION INTRAVENOUS at 17:20

## 2023-11-28 NOTE — ED ATTENDING ATTESTATION
11/28/2023  Aylin Loredo DO, saw and evaluated the patient. I have discussed the patient with the resident/non-physician practitioner and agree with the resident's/non-physician practitioner's findings, Plan of Care, and MDM as documented in the resident's/non-physician practitioner's note, except where noted. All available labs and Radiology studies were reviewed. I was present for key portions of any procedure(s) performed by the resident/non-physician practitioner and I was immediately available to provide assistance. At this point I agree with the current assessment done in the Emergency Department. I have conducted an independent evaluation of this patient a history and physical is as follows:    29-year-old male who presents to the ED for evaluation of right eyelid drooping. States symptoms started about a week ago initially with left-sided eyelid drooping. Was seen by his eye doctor at that time and was started on drops due to what he describes to be an abrasion. That has since resolved. 4 days ago now has started on the right side. Was seen by eye doctor again and then referred to the ED for evaluation that there was something else going on. Denies any actual vision changes, states that his vision is a little blurry when his eyes open for too long, wears glasses at baseline. No fevers. No headaches. No new neck pains. No chest pain or shortness of breath. No pain or weakness of the extremities. On my exam, resting comfortably no acute distress, appears to have ptosis on the right but otherwise cranial nerves are intact, no carotid bruits bilaterally, normal heart sounds, normal lung sounds, abdomen soft nontender nondistended, 5 out of 5 muscle strength in all extremities, gross intact sensation to light touch in all extremities. No skin changes overlying the face or right periorbital area. No rashes. No travel or exposures.   Labs show no leukocytosis, stable hemoglobin, no acute electrolyte abnormalities, baseline renal function, no acute LFT abnormalities. Lyme was sent and is pending. CT head noncontrast as well as CTA head/neck was obtained. Shows no acute abnormalities to explain the patient's symptoms. Appears to have isolated ptosis at this time. Plan will be for discharge with close outpatient follow-up. Ambulatory referral placed to neurology. Stable for discharge and agreeable to the plan.   Strict return ED precautions given    ED Course         Critical Care Time  Procedures

## 2023-11-28 NOTE — ED PROVIDER NOTES
History  Chief Complaint   Patient presents with    Eye Problem     R eye lid drooping x 4 days saw eye doctor 1 week ago for other side doing the same thing and improved with drops saw eye doctor today for follow and was told to come to Er because r side was now an issue. Mr. Maryann King is an 44-year-old male with a past medical history of CKD, HTN, and sleep apnea who presents to the ED for proptosis of the right eye. Patient reports that for 4 days his right eye has become progressively more difficult to open. He reports that he has been holding his eye open to drive. He reports that when the eye is open he has normal vision out of that eye. Denies any visual field deficits, blurriness, pain with extraocular movements or with palpation of the eyelid, recent trauma, or shortness of breath. Patient reports that his blood pressure is well-controlled on his medication. He reports that he was having some eye issues with his left eye a couple of weeks ago, but was seen by his ophthalmologist and was told that he had a corneal abrasion. He was given some eyedrops and reports that that eye feels completely better. Reports that all of the swelling of that eyelid is resolved. Prior to Admission Medications   Prescriptions Last Dose Informant Patient Reported? Taking?    LUMIGAN 0.01 % ophthalmic drops  Self Yes No   Sig: Administer into the left eye daily at bedtime    aspirin 81 mg chewable tablet  Self Yes No   Sig: Chew daily    Patient not taking: Reported on 11/15/2023   docusate sodium (COLACE) 100 mg capsule  Self No No   Sig: Take 1 capsule (100 mg total) by mouth 2 (two) times a day   Patient not taking: Reported on 9/10/2021   enoxaparin (LOVENOX) 40 mg/0.4 mL  Self No No   Sig: Inject 0.4 mL (40 mg total) under the skin daily   Patient not taking: Reported on 9/10/2021   gabapentin (NEURONTIN) 100 mg capsule  Self Yes No   Sig: Take 100 mg by mouth 2 (two) times a day lisinopril-hydrochlorothiazide (PRINZIDE,ZESTORETIC) 20-25 MG per tablet  Self Yes No   Sig: Take 1 tablet by mouth daily   oxyCODONE (ROXICODONE) 5 mg immediate release tablet  Self No No   Si pill po Q6 Hrs prn severe pain   Patient not taking: Reported on 9/10/2021   triamcinolone (KENALOG) 0.1 % ointment  Self No No   Sig: Apply topically twice a day for up to 14 days to active areas as needed. May repeat course after taking 1 week break. Do not use on face, groin, armpits. Do not use on open skin   Patient not taking: Reported on 2023      Facility-Administered Medications: None       Past Medical History:   Diagnosis Date    Chronic kidney disease     Hypertension     Sleep apnea     no CPAP        Past Surgical History:   Procedure Laterality Date    CARPAL TUNNEL RELEASE      COLONOSCOPY      GA ARTHROCENTESIS ASPIR&/INJ MAJOR JT/BURSA W/O US  2018    Procedure: ASPIRATION RIGHT KNEE BURSA; Surgeon: Willian Keating MD;  Location: [facility];  Service: Orthopedics    GA ARTHRP KNE CONDYLE&PLATU MEDIAL&LAT COMPARTMENTS Left 2018    Procedure: ARTHROPLASTY KNEE TOTAL;  Surgeon: Willian Keating MD;  Location: [facility];  Service: Orthopedics    GA ARTHRP KNE CONDYLE&PLATU MEDIAL&LAT COMPARTMENTS Right 2021    Procedure: ARTHROPLASTY KNEE TOTAL;  Surgeon: Willian Keating MD;  Location: [facility];  Service: Orthopedics       Family History   Problem Relation Age of Onset    No Known Problems Father      I have reviewed and agree with the history as documented.     E-Cigarette/Vaping    E-Cigarette Use Never User      E-Cigarette/Vaping Substances    Nicotine No     THC No     CBD No     Flavoring No     Other No     Unknown No      Social History     Tobacco Use    Smoking status: Former    Smokeless tobacco: Never    Tobacco comments:     quit 50 years ago    Vaping Use    Vaping Use: Never used   Substance Use Topics    Alcohol use: Not Currently    Drug use: No        Review of Systems   Constitutional:  Negative for activity change, chills and fever. HENT:  Negative for sinus pressure and sinus pain. Eyes:  Negative for photophobia, pain, discharge, redness, itching and visual disturbance. Respiratory:  Negative for chest tightness and shortness of breath. Cardiovascular:  Negative for chest pain. Gastrointestinal:  Negative for abdominal pain, constipation, diarrhea, nausea and vomiting. Genitourinary:  Negative for dysuria and hematuria. Skin:  Negative for rash and wound. Neurological:  Negative for dizziness, syncope, light-headedness and headaches. Psychiatric/Behavioral: Negative. Physical Exam  ED Triage Vitals [11/28/23 1508]   Temperature Pulse Respirations Blood Pressure SpO2   98.1 °F (36.7 °C) 89 18 145/77 95 %      Temp Source Heart Rate Source Patient Position - Orthostatic VS BP Location FiO2 (%)   Oral Monitor Sitting Right arm --      Pain Score       --             Orthostatic Vital Signs  Vitals:    11/28/23 1508 11/28/23 1639 11/28/23 1730 11/28/23 1830   BP: 145/77 114/58 125/76 125/76   Pulse: 89 85 80 76   Patient Position - Orthostatic VS: Sitting          Physical Exam  Vitals and nursing note reviewed. Constitutional:       Appearance: Normal appearance. HENT:      Head: Normocephalic and atraumatic. Comments: Patient's right eyelid sits lower than his left when his eyes are open, and frequently the right eye simply closed. Patient denies any pain to the area with movement or palpation. He endorses normal vision in the right eye when his eyes open. The eye does not appear to be protruding, or retracting. Extraocular movements intact. Right Ear: External ear normal.      Left Ear: External ear normal.      Nose: Nose normal.      Mouth/Throat:      Mouth: Mucous membranes are moist.      Pharynx: Oropharynx is clear. Eyes:      Extraocular Movements: Extraocular movements intact.       Conjunctiva/sclera: Conjunctivae normal.   Cardiovascular:      Rate and Rhythm: Normal rate and regular rhythm. Heart sounds: Normal heart sounds. Pulmonary:      Effort: Pulmonary effort is normal.      Breath sounds: Normal breath sounds. Abdominal:      General: Abdomen is flat. There is no distension. Tenderness: There is no abdominal tenderness. There is no guarding. Musculoskeletal:         General: Normal range of motion. Cervical back: Normal range of motion and neck supple. Skin:     General: Skin is warm and dry. Neurological:      General: No focal deficit present. Mental Status: He is alert and oriented to person, place, and time.    Psychiatric:         Mood and Affect: Mood normal.         Behavior: Behavior normal.         ED Medications  Medications   sodium chloride 0.9 % bolus 1,000 mL (0 mL Intravenous Stopped 11/28/23 1948)   iohexol (OMNIPAQUE) 350 MG/ML injection (MULTI-DOSE) 85 mL (85 mL Intravenous Given 11/28/23 1720)       Diagnostic Studies  Results Reviewed       Procedure Component Value Units Date/Time    Comprehensive metabolic panel [[id number]]  (Abnormal) Collected: 11/28/23 1633    Lab Status: Final result Specimen: Blood from Arm, Right Updated: 11/28/23 1658     Sodium 139 mmol/L      Potassium 3.9 mmol/L      Chloride 103 mmol/L      CO2 27 mmol/L      ANION GAP 9 mmol/L      BUN 30 mg/dL      Creatinine 1.51 mg/dL      Glucose 100 mg/dL      Calcium 10.0 mg/dL      AST 14 U/L      ALT 12 U/L      Alkaline Phosphatase 68 U/L      Total Protein 7.6 g/dL      Albumin 4.4 g/dL      Total Bilirubin 0.81 mg/dL      eGFR 41 ml/min/1.73sq m     Narrative:      [place] guidelines for Chronic Kidney Disease (CKD):     Stage 1 with normal or high GFR (GFR > 90 mL/min/1.73 square meters)    Stage 2 Mild CKD (GFR = 60-89 mL/min/1.73 square meters)    Stage 3A Moderate CKD (GFR = 45-59 mL/min/1.73 square meters)    Stage 3B Moderate CKD (GFR = 30-44 mL/min/1.73 square meters)    Stage 4 Severe CKD (GFR = 15-29 mL/min/1.73 square meters)    Stage 5 End Stage CKD (GFR <15 mL/min/1.73 square meters)  Note: GFR calculation is accurate only with a steady state creatinine    CBC and differential [[id number]]  (Abnormal) Collected: 11/28/23 1633    Lab Status: Final result Specimen: Blood from Arm, Right Updated: 11/28/23 1643     WBC 5.96 Thousand/uL      RBC 4.57 Million/uL      Hemoglobin 15.2 g/dL      Hematocrit 46.1 %       fL      MCH 33.3 pg      MCHC 33.0 g/dL      RDW 12.6 %      MPV 9.3 fL      Platelets 887 Thousands/uL      nRBC 0 /100 WBCs      Neutrophils Relative 62 %      Immat GRANS % 0 %      Lymphocytes Relative 25 %      Monocytes Relative 9 %      Eosinophils Relative 3 %      Basophils Relative 1 %      Neutrophils Absolute 3.72 Thousands/µL      Immature Grans Absolute 0.02 Thousand/uL      Lymphocytes Absolute 1.47 Thousands/µL      Monocytes Absolute 0.52 Thousand/µL      Eosinophils Absolute 0.18 Thousand/µL      Basophils Absolute 0.05 Thousands/µL     [place] Total AB W Reflex to IGM/IGG [[id number]] Collected: 11/28/23 1633    Lab Status: In process Specimen: Blood from Arm, Right Updated: 11/28/23 1640    Narrative: The following orders were created for panel order [place] Total AB W Reflex to IGM/IGG. Procedure                               Abnormality         Status                     ---------                               -----------         ------                     Lyme Total AB W Reflex celso Forrest [[id number]]                      In process                   Please view results for these tests on the individual orders. Lyme Total AB W Reflex to IGM/IGG [[id number]] Collected: 11/28/23 1633    Lab Status: In process Specimen: Blood from Arm, Right Updated: 11/28/23 1640                   CTA head and neck with and without contrast   Final Result by Jared Plata MD (11/28 1913)         1.  No evidence of acute infarct, intracranial hemorrhage or mass. 2. No hemodynamically significant stenosis, dissection or occlusion of the carotid or vertebral arteries or major vessels of the [place]. Workstation performed: [id number]               Procedures  Procedures      ED Course  ED Course as of 11/28/23 2011 Tue Nov 28, 2023   1735 Creatinine(!): 1.51  Baseline                               SBIRT 22yo+      Flowsheet Row Most Recent Value   Initial Alcohol Screen: US AUDIT-C     1. How often do you have a drink containing alcohol? 0 Filed at: 11/28/2023 1640   2. How many drinks containing alcohol do you have on a typical day you are drinking? 0 Filed at: 11/28/2023 1640   3a. Male UNDER 65: How often do you have five or more drinks on one occasion? 0 Filed at: 11/28/2023 1640   3b. FEMALE Any Age, or MALE 65+: How often do you have 4 or more drinks on one occassion? 0 Filed at: 11/28/2023 1640   Audit-C Score 0 Filed at: 11/28/2023 [facility]   KIANA: How many times in the past year have you. .. Used an illegal drug or used a prescription medication for non-medical reasons? Never Filed at: 11/28/2023 1640                  Medical Decision Making  Mr. Gilma Mendez is an 42-year-old male with a past medical history of CKD, HTN, and sleep apnea who presents to the ED for proptosis of the right eye. Based on history and physical DDx includes but is not limited to: raúl's syndrome, pancoast tumor, stroke, meningitis, carotid dissection, cluster HA, cavernous sinus hemorrhage/hematoma. CTA head:"1. No evidence of acute infarct, intracranial hemorrhage or mass. 2. No hemodynamically significant stenosis, dissection or occlusion of the carotid or vertebral arteries or major vessels of the [place]."    Ambulatory referral placed to Neurology for follow up. Advised patient not to drive while he is having eye problems. Results shared with patient.  Return precautions given and patient expresses understanding and is comfortable with discharge. Amount and/or Complexity of Data Reviewed  Labs: ordered. Decision-making details documented in ED Course. Radiology: ordered. Risk  Prescription drug management. Disposition  Final diagnoses:   Ptosis     Time reflects when diagnosis was documented in both MDM as applicable and the Disposition within this note       Time User Action Codes Description Comment    11/28/2023  7:24 PM Maury Alexandre Add [Y65.284] Ptosis           ED Disposition       ED Disposition   Discharge    Condition   Stable    Date/Time   Tue Nov 28, 2023 1924    [address] discharge to home/self care.                    Follow-up Information       Follow up With Specialties Details Why Contact Info Additional [address] [facility]) Neurology Schedule an appointment as soon as possible for a visit   [address] [place] [address] [address] [facility] [address] [place] [address] Neurology [address] [facility], [address] [facility]            Discharge Medication List as of 11/28/2023  7:25 PM        CONTINUE these medications which have NOT CHANGED    Details   aspirin 81 mg chewable tablet Chew daily , Historical Med      docusate sodium (COLACE) 100 mg capsule Take 1 capsule (100 mg total) by mouth 2 (two) times a day, Starting Thu 6/17/2021, Normal      enoxaparin (LOVENOX) 40 mg/0.4 mL Inject 0.4 mL (40 mg total) under the skin daily, Starting Fri 4/30/2021, Normal      gabapentin (NEURONTIN) 100 mg capsule Take 100 mg by mouth 2 (two) times a day, Historical Med      lisinopril-hydrochlorothiazide (PRINZIDE,ZESTORETIC) 20-25 MG per tablet Take 1 tablet by mouth daily, Historical Med      LUMIGAN 0.01 % ophthalmic drops Administer into the left eye daily at bedtime , Starting Mon 5/14/2018, Historical Med      oxyCODONE (ROXICODONE) 5 mg immediate release tablet 1 pill po Q6 Hrs prn severe pain, Normal triamcinolone (KENALOG) 0.1 % ointment Apply topically twice a day for up to 14 days to active areas as needed. May repeat course after taking 1 week break. Do not use on face, groin, armpits. Do not use on open skin, Normal               PDMP Review         Value Time User    PDMP Reviewed  Yes 7/2/2021  2:42 PM Atilio Kelly PA-C             ED Provider  Attending physically available and evaluated Marshall Patella. I managed the patient along with the ED Attending.     Electronically Signed by           Dali Craig MD  11/28/23 2011

## 2023-12-13 ENCOUNTER — APPOINTMENT (OUTPATIENT)
Dept: LAB | Facility: MEDICAL CENTER | Age: 88
End: 2023-12-13
Payer: COMMERCIAL

## 2023-12-13 DIAGNOSIS — H02.421 MYOGENIC PTOSIS OF RIGHT EYELID: ICD-10-CM

## 2023-12-13 PROCEDURE — 86255 FLUORESCENT ANTIBODY SCREEN: CPT

## 2023-12-13 PROCEDURE — 36415 COLL VENOUS BLD VENIPUNCTURE: CPT

## 2023-12-13 PROCEDURE — 83519 RIA NONANTIBODY: CPT

## 2023-12-15 LAB — ACHR BIND AB SER-SCNC: 1.5 NMOL/L (ref 0–0.24)

## 2023-12-20 LAB — ACHR BLOCK AB/ACHR TOTAL SFR SER: 105 % (ref 0–25)

## 2023-12-26 LAB — ACHR MOD AB/ACHR TOTAL SFR SER: 78 % (ref 0–45)

## 2024-01-12 ENCOUNTER — TELEPHONE (OUTPATIENT)
Age: 89
End: 2024-01-12

## 2024-01-12 DIAGNOSIS — Z12.5 SCREENING PSA (PROSTATE SPECIFIC ANTIGEN): Primary | ICD-10-CM

## 2024-01-12 DIAGNOSIS — N40.0 BENIGN PROSTATIC HYPERPLASIA WITHOUT LOWER URINARY TRACT SYMPTOMS: ICD-10-CM

## 2024-01-12 NOTE — TELEPHONE ENCOUNTER
Patient requesting PSA order to be placed in chart for his April apt with our office. Please let patient know once this is placed.     CB: [phone]

## 2024-01-16 ENCOUNTER — OFFICE VISIT (OUTPATIENT)
Dept: DERMATOLOGY | Facility: CLINIC | Age: 89
End: 2024-01-16
Payer: COMMERCIAL

## 2024-01-16 VITALS — BODY MASS INDEX: 40.8 KG/M2 | HEIGHT: 70 IN | WEIGHT: 285 LBS | TEMPERATURE: 97.8 F

## 2024-01-16 DIAGNOSIS — L20.89 OTHER ATOPIC DERMATITIS: ICD-10-CM

## 2024-01-16 DIAGNOSIS — R21 RASH: Primary | ICD-10-CM

## 2024-01-16 DIAGNOSIS — I87.2 VENOUS STASIS DERMATITIS OF RIGHT LOWER EXTREMITY: ICD-10-CM

## 2024-01-16 PROCEDURE — 99214 OFFICE O/P EST MOD 30 MIN: CPT | Performed by: DERMATOLOGY

## 2024-01-16 RX ORDER — TRIAMCINOLONE ACETONIDE 1 MG/G
OINTMENT TOPICAL
Qty: 453.6 G | Refills: 1 | Status: SHIPPED | OUTPATIENT
Start: 2024-01-16

## 2024-01-16 NOTE — PATIENT INSTRUCTIONS
RASH FOLLOW UP    Assessment and Plan:  Based on a thorough discussion of this condition and the management approach to it (including a comprehensive discussion of the known risks, side effects and potential benefits of treatment), the patient (family) agrees to implement the following specific plan:  Try to limit the use of Betamethasone and Triamcinolone ointments since they are strong steroids-try and use for flare ups only!  Use moisturizer like Eucerin,Cerave, Vanicream, Vaseline, or Aveeno Cream 3 times a day for the dry skin

## 2024-01-16 NOTE — PROGRESS NOTES
"[facility] Note     Patient Name: Hung Poe  Encounter Date: 01/16/2024     Have you been cared for by a [facility] Dermatologist in the last 3 years and, if so, which description applies to you?    Yes.  I have been here within the last 3 years, and my medical history has NOT changed since that time.  I am MALE/not capable of bearing children.    REVIEW OF SYSTEMS:  Have you recently had or currently have any of the following? No changes in my recent health.   PAST MEDICAL HISTORY:  Have you personally ever had or currently have any of the following?  If \"YES,\" then please provide more detail. No changes in my medical history.   HISTORY OF IMMUNOSUPPRESSION: Do you have a history of any of the following:  Systemic Immunosuppression such as Diabetes, Biologic or Immunotherapy, Chemotherapy, Organ Transplantation, Bone Marrow Transplantation?  No     Answering \"YES\" requires the addition of the dotphrase \"IMMUNOSUPPRESSED\" as the first diagnosis of the patient's visit.   FAMILY HISTORY:  Any \"first degree relatives\" (parent, brother, sister, or child) with the following?    No changes in my family's known health.   PATIENT EXPERIENCE:    Do you want the Dermatologist to perform a COMPLETE skin exam today including a clinical examination under the \"bra and underwear\" areas?  NO  If necessary, do we have your permission to call and leave a detailed message on your Preferred Phone number that includes your specific medical information?  Yes      No Known Allergies   Current Outpatient Medications:     gabapentin (NEURONTIN) 100 mg capsule, Take 100 mg by mouth 2 (two) times a day, Disp: , Rfl:     lisinopril-hydrochlorothiazide (PRINZIDE,ZESTORETIC) 20-25 MG per tablet, Take 1 tablet by mouth daily, Disp: , Rfl:     LUMIGAN 0.01 % ophthalmic drops, Administer into the left eye daily at bedtime , Disp: , Rfl:     aspirin 81 mg chewable tablet, Chew daily  (Patient not taking: Reported on " 11/15/2023), Disp: , Rfl:     docusate sodium (COLACE) 100 mg capsule, Take 1 capsule (100 mg total) by mouth 2 (two) times a day (Patient not taking: Reported on 9/10/2021), Disp: 10 capsule, Rfl: 0    enoxaparin (LOVENOX) 40 mg/0.4 mL, Inject 0.4 mL (40 mg total) under the skin daily (Patient not taking: Reported on 9/10/2021), Disp: 28 mL, Rfl: 0    oxyCODONE (ROXICODONE) 5 mg immediate release tablet, 1 pill po Q6 Hrs prn severe pain (Patient not taking: Reported on 9/10/2021), Disp: 20 tablet, Rfl: 0    triamcinolone (KENALOG) 0.1 % ointment, Apply topically twice a day for up to 14 days to active areas as needed. May repeat course after taking 1 week break. Do not use on face, groin, armpits. Do not use on open skin (Patient not taking: Reported on 11/7/2023), Disp: 453.6 g, Rfl: 1          Whom besides the patient is providing clinical information about today's encounter?   NO ADDITIONAL HISTORIAN (patient alone provided history)    Physical Exam and Assessment/Plan by Diagnosis:    RASH FOLLOW UP    Physical Exam:  (Anatomic Location); (Size and Morphological Description); (Differential Diagnosis):  left upper arm, elbow; thinner pink scaly plaques   Pertinent Positives:  Pertinent Negatives:    Additional History of Present Condition:  Patient was last seen on 11/7/23. Patient is currently using Betamethasone and Triamcinolone. Patient states regimen from last visit helped and the rash/itch went away.     Assessment and Plan:  Based on a thorough discussion of this condition and the management approach to it (including a comprehensive discussion of the known risks, side effects and potential benefits of treatment), the patient (family) agrees to implement the following specific plan:  Try to limit the use of Betamethasone and Triamcinolone ointments since they are strong steroids-try and use for flare ups only!  Use moisturizer like Eucerin,Cerave, Vanicream, Vaseline, or Aveeno Cream 3 times a day for the  dry skin             Scribe Attestation      I,:  Abena Heredia am acting as a scribe while in the presence of the attending physician.:       I,:  Sandra Manriquez MD personally performed the services described in this documentation    as scribed in my presence.:

## 2024-04-02 ENCOUNTER — TELEPHONE (OUTPATIENT)
Dept: NEUROLOGY | Facility: CLINIC | Age: 89
End: 2024-04-02

## 2024-04-04 ENCOUNTER — CONSULT (OUTPATIENT)
Dept: NEUROLOGY | Facility: CLINIC | Age: 89
End: 2024-04-04
Payer: COMMERCIAL

## 2024-04-04 VITALS
BODY MASS INDEX: 41.48 KG/M2 | HEART RATE: 80 BPM | WEIGHT: 285 LBS | SYSTOLIC BLOOD PRESSURE: 110 MMHG | DIASTOLIC BLOOD PRESSURE: 74 MMHG

## 2024-04-04 DIAGNOSIS — G70.00 MYASTHENIA GRAVIS (HCC): Primary | ICD-10-CM

## 2024-04-04 DIAGNOSIS — H02.409 PTOSIS: ICD-10-CM

## 2024-04-04 PROCEDURE — 99205 OFFICE O/P NEW HI 60 MIN: CPT | Performed by: STUDENT IN AN ORGANIZED HEALTH CARE EDUCATION/TRAINING PROGRAM

## 2024-04-04 RX ORDER — PYRIDOSTIGMINE BROMIDE 60 MG/1
30 TABLET ORAL 3 TIMES DAILY
Qty: 270 TABLET | Refills: 1 | Status: SHIPPED | OUTPATIENT
Start: 2024-04-04 | End: 2024-05-08

## 2024-04-04 NOTE — PROGRESS NOTES
[facility] Neurology Consult  PATIENT:  Hung Poe  MRN:  [id number]  :  1935  DATE OF SERVICE:  2024  REFERRED BY: Anant Concepcion DO  PMD: Joss Lo DO    Assessment/Plan:     Hung Poe is a very pleasant 88 y.o. male with a past medical history that includes HTN, CKD referred here for evaluation of ptosis.    Myasthenia gravis  -previous lab w/u notable for positive AchR blocking, binding and modulating Ab c/f MG  -start mestinon 30 mg TID   -CT chest to evaluate for thymoma  -f/u to establish care with neuromuscular specialist    CC:   ptosis    History of Present Illness:     88 y.o. male with a past medical history that includes HTN, CKD referred here for evaluation of ptosis.    He states that he started to have ptosis back in 2023 initially affecting the L side of his face. He was seen by his ophthalmologist, told he had a corneal abrasion, and was prescribed eye drops. Symptoms self-resolved. A few weeks later, he began to experience symptoms affecting his R eye, stating that it had become very difficult to open. He was sent to the ED for evaluation. CTA was performed, which was negative for any evidence of any vascular abnormalities in the head or neck.     He was seen by [facility] for follow up after his ED visit, and MG testing was ordered. AchR blocking, binding and modulating were all found to be positive. Today, he continues to experience R sided ptosis. He denies any additional fatigable weakness, diplopia, dysarthria, dysphagia, or SOB.     Past Medical History:     Past Medical History:   Diagnosis Date    Chronic kidney disease     Hypertension     Sleep apnea     no CPAP        Patient Active Problem List   Diagnosis    Primary osteoarthritis of left knee    Chronic pain of left knee    Right knee pain    Prepatellar bursitis of right knee    Aftercare following left knee joint replacement surgery    Effusion of right knee    Essential hypertension    Stage 3a chronic  kidney disease ([facility])    Class 3 severe obesity due to excess calories without serious comorbidity with body mass index (BMI) of 40.0 to 44.9 in adult ([facility])    S/P total knee arthroplasty, right       Medications:      Current Outpatient Medications   Medication Sig Dispense Refill    gabapentin (NEURONTIN) 100 mg capsule Take 100 mg by mouth 2 (two) times a day      lisinopril-hydrochlorothiazide (PRINZIDE,ZESTORETIC) 20-25 MG per tablet Take 1 tablet by mouth daily      LUMIGAN 0.01 % ophthalmic drops Administer into the left eye daily at bedtime       triamcinolone (KENALOG) 0.1 % ointment Apply topically twice a day for up to 14 days to active areas as needed. May repeat course after taking 1 week break. Do not use on face, groin, armpits. Do not use on open skin 453.6 g 1    aspirin 81 mg chewable tablet Chew daily  (Patient not taking: Reported on 11/15/2023)      docusate sodium (COLACE) 100 mg capsule Take 1 capsule (100 mg total) by mouth 2 (two) times a day (Patient not taking: Reported on 9/10/2021) 10 capsule 0    enoxaparin (LOVENOX) 40 mg/0.4 mL Inject 0.4 mL (40 mg total) under the skin daily (Patient not taking: Reported on 9/10/2021) 28 mL 0    oxyCODONE (ROXICODONE) 5 mg immediate release tablet 1 pill po Q6 Hrs prn severe pain (Patient not taking: Reported on 9/10/2021) 20 tablet 0     No current facility-administered medications for this visit.        Allergies:    No Known Allergies    Family History:     Family History   Problem Relation Age of Onset    No Known Problems Father        Social History:       Social History     Socioeconomic History    Marital status: /Civil Union     Spouse name: Not on file    Number of children: Not on file    Years of education: Not on file    Highest education level: Not on file   Occupational History    Not on file   Tobacco Use    Smoking status: Former    Smokeless tobacco: Never    Tobacco comments:     quit 50 years ago    Vaping Use    Vaping  status: Never Used   Substance and Sexual Activity    Alcohol use: Not Currently    Drug use: No    Sexual activity: Not on file   Other Topics Concern    Not on file   Social History Narrative    Not on file     Social Determinants of Health     Financial Resource Strain: Not on file   Food Insecurity: Not on file   Transportation Needs: Not on file   Physical Activity: Not on file   Stress: Not on file   Social Connections: Not on file   Intimate Partner Violence: Not on file   Housing Stability: Not on file         Objective:   /74 (BP Location: Right arm, Patient Position: Sitting, Cuff Size: Large)   Pulse 80   Wt 129 kg (285 lb)   BMI 41.48 kg/m²     General: Patient is not in any acute/apparent distress, well nourished, well developed and cooperative.   HEENT: normocephalic, atraumatic, moist membranes  Neck: supple  Extremities: no edema noted   Skin: no lesions or rash  Musculosketal: no bony abnormalities    Neurologic Examination:   Mental status: alert, awake, oriented X 3 and following commands.     Speech/Language: Speech is fluent without any dysarthria, no aphasia noted, can name, repeat, read and write and comprehension intact    Cranial Nerves:   CN I: smell not tested  CN II: Visual fields full to confrontation  CN III, IV, VI: Extraocular movements intact bilaterally. Pupils equal round and reactive to light bilaterally.  CN V: Facial sensation is normal.  CN VII: R sided ptosis effecting the eye and R sided lower face  CN VIII: Hearing is normal.  CN IX, X: Palate elevates symmetrically.   CN XI: Shoulder shrug strength is normal.  CN XII: Tongue midline without atrophy or fasciculations.    Motor:   Strength 5/5 in all 4 extremities. No fatigable weakness noted. Bulk/tone - normal.     Sensory:   Sensation intact to soft touch in all 4 extremities.    Cerebellar:   Finger-to-nose intact    Reflexes: 2+ in all 4 extremities  Pathologic reflexes - babinski reflex negative    Gait:    Normal gait, normal arm swing. Romberg negative     Imaging:   CTA H+N 11/28/23  FINDINGS:  NONCONTRAST BRAIN  PARENCHYMA: Periventricular and subcortical hypoattenuating foci consistent with microangiopathic disease.     No acute intracranial hemorrhage or mass effect.     VENTRICLES AND EXTRA-AXIAL SPACES: No hydrocephalus or extra-axial collection.     VISUALIZED ORBITS: Intact globes and orbits.     PARANASAL SINUSES: Clear.     CERVICAL VASCULATURE  AORTIC ARCH AND GREAT VESSELS: Three-vessel configuration of the aortic arch. No stenosis within the subclavian arteries..     RIGHT VERTEBRAL ARTERY CERVICAL SEGMENT:  Normal origin. The vessel is normal in caliber throughout the neck.     LEFT VERTEBRAL ARTERY CERVICAL SEGMENT:  Normal origin. The vessel is normal in caliber throughout the neck.     RIGHT EXTRACRANIAL CAROTID SEGMENT:  Normal caliber common carotid artery.  Normal bifurcation and cervical internal carotid artery.  No stenosis or dissection.     LEFT EXTRACRANIAL CAROTID SEGMENT:  Normal caliber common carotid artery.  Normal bifurcation and cervical internal carotid artery.  No stenosis or dissection.     NASCET criteria was used to determine the degree of internal carotid artery diameter stenosis.     INTRACRANIAL VASCULATURE  INTERNAL CAROTID ARTERIES: Calcified plaque in the paraclinoid segment of the right ICA results in mild (less than 50%) stenosis.     ANTERIOR CIRCULATION:  Symmetric A1 segments and anterior cerebral arteries with normal enhancement.  Normal anterior communicating artery.     MIDDLE CEREBRAL ARTERY CIRCULATION:  M1 segment and middle cerebral artery branches demonstrate normal enhancement bilaterally.     DISTAL VERTEBRAL ARTERIES:  Normal distal vertebral arteries.  Posterior inferior cerebellar arteries are patent.     BASILAR ARTERY:  Basilar artery is normal in caliber. Patent superior cerebellar arteries.     POSTERIOR CEREBRAL ARTERIES: Left posterior communicating  artery identified. No stenosis in the posterior cerebral arteries.     VENOUS STRUCTURES: Patent dural venous sinuses.        NON VASCULAR ANATOMY  BONY STRUCTURES:  No acute osseous abnormality.     SOFT TISSUES OF THE NECK: No mass or lymphadenopathy.     THORACIC INLET: Clear lung apices.        IMPRESSION:  1. No evidence of acute infarct, intracranial hemorrhage or mass.  2. No hemodynamically significant stenosis, dissection or occlusion of the carotid or vertebral arteries or major vessels of the [place].       Review of Systems:     ROS:    Review of Systems   Constitutional:  Negative for appetite change, fatigue and fever.   HENT: Negative.  Negative for hearing loss, tinnitus, trouble swallowing and voice change.    Eyes:  Positive for pain. Negative for photophobia and visual disturbance.        Patient states he has a droop in his R eye and blurred vision.    Respiratory: Negative.  Negative for shortness of breath.    Cardiovascular: Negative.  Negative for palpitations.   Gastrointestinal: Negative.  Negative for nausea and vomiting.   Endocrine: Negative.  Negative for cold intolerance.   Genitourinary: Negative.  Negative for dysuria, frequency and urgency.   Musculoskeletal:  Positive for neck pain (patient states if he places his neck back he feels discomfort.). Negative for back pain, gait problem, myalgias and neck stiffness.   Skin: Negative.  Negative for rash.   Allergic/Immunologic: Negative.    Neurological: Negative.  Negative for dizziness, tremors, seizures, syncope, facial asymmetry, speech difficulty, weakness, light-headedness, numbness and headaches.   Hematological: Negative.  Does not bruise/bleed easily.   Psychiatric/Behavioral: Negative.  Negative for confusion, hallucinations and sleep disturbance.      I have spent a total time of 52 minutes on 04/04/24 in caring for this patient including Risks and benefits of tx options, Instructions for management, Patient and  family education, Risk factor reductions, Impressions, Documenting in the medical record, Reviewing / ordering tests, medicine, procedures  , and Obtaining or reviewing history  .

## 2024-04-12 ENCOUNTER — APPOINTMENT (OUTPATIENT)
Dept: LAB | Facility: MEDICAL CENTER | Age: 89
End: 2024-04-12
Payer: COMMERCIAL

## 2024-04-12 ENCOUNTER — TELEPHONE (OUTPATIENT)
Dept: NEUROLOGY | Facility: CLINIC | Age: 89
End: 2024-04-12

## 2024-04-12 DIAGNOSIS — Z12.5 SCREENING PSA (PROSTATE SPECIFIC ANTIGEN): ICD-10-CM

## 2024-04-12 DIAGNOSIS — N40.0 BENIGN PROSTATIC HYPERPLASIA WITHOUT LOWER URINARY TRACT SYMPTOMS: ICD-10-CM

## 2024-04-12 PROCEDURE — 36415 COLL VENOUS BLD VENIPUNCTURE: CPT

## 2024-04-12 PROCEDURE — 84153 ASSAY OF PSA TOTAL: CPT

## 2024-04-12 PROCEDURE — 84154 ASSAY OF PSA FREE: CPT

## 2024-04-12 NOTE — TELEPHONE ENCOUNTER
Pt wife called in about CT being canceled due to not having labs completed. Pt wife stated they were unaware of needing labs done. Pt needs a pre auth for the labs. Pt would like [place]. Pt needs a pre auth for labs.       Pt wife would like a call once labs are to be collected. She also wants to know if pt needs to fast.     Heidi Peo (Spouse)  [phone] (Mobile)

## 2024-04-13 LAB
PSA FREE MFR SERPL: 12.2 %
PSA FREE SERPL-MCNC: 1.13 NG/ML
PSA SERPL-MCNC: 9.3 NG/ML (ref 0–4)

## 2024-04-15 ENCOUNTER — APPOINTMENT (OUTPATIENT)
Dept: LAB | Facility: MEDICAL CENTER | Age: 89
End: 2024-04-15
Payer: COMMERCIAL

## 2024-04-15 DIAGNOSIS — N28.9 ABNORMAL RENAL FUNCTION: ICD-10-CM

## 2024-04-15 DIAGNOSIS — G70.00 MYASTHENIA GRAVIS (HCC): ICD-10-CM

## 2024-04-15 LAB
ANION GAP SERPL CALCULATED.3IONS-SCNC: 9 MMOL/L (ref 4–13)
BUN SERPL-MCNC: 30 MG/DL (ref 5–25)
CALCIUM SERPL-MCNC: 9.6 MG/DL (ref 8.4–10.2)
CHLORIDE SERPL-SCNC: 102 MMOL/L (ref 96–108)
CO2 SERPL-SCNC: 28 MMOL/L (ref 21–32)
CREAT SERPL-MCNC: 1.56 MG/DL (ref 0.6–1.3)
GFR SERPL CREATININE-BSD FRML MDRD: 39 ML/MIN/1.73SQ M
GLUCOSE P FAST SERPL-MCNC: 190 MG/DL (ref 65–99)
POTASSIUM SERPL-SCNC: 4.4 MMOL/L (ref 3.5–5.3)
SODIUM SERPL-SCNC: 139 MMOL/L (ref 135–147)

## 2024-04-15 PROCEDURE — 80048 BASIC METABOLIC PNL TOTAL CA: CPT

## 2024-04-15 PROCEDURE — 36415 COLL VENOUS BLD VENIPUNCTURE: CPT

## 2024-04-18 ENCOUNTER — OFFICE VISIT (OUTPATIENT)
Dept: UROLOGY | Facility: CLINIC | Age: 89
End: 2024-04-18
Payer: COMMERCIAL

## 2024-04-18 VITALS
HEIGHT: 69 IN | BODY MASS INDEX: 43.69 KG/M2 | HEART RATE: 86 BPM | WEIGHT: 295 LBS | OXYGEN SATURATION: 93 % | SYSTOLIC BLOOD PRESSURE: 110 MMHG | DIASTOLIC BLOOD PRESSURE: 60 MMHG

## 2024-04-18 DIAGNOSIS — R97.20 ELEVATED PSA: Primary | ICD-10-CM

## 2024-04-18 PROCEDURE — 99214 OFFICE O/P EST MOD 30 MIN: CPT

## 2024-04-18 NOTE — PROGRESS NOTES
[facility]   Department of Infectious Disease  Consult Note        PATIENT NAME: Alma Rosa Helm  MRN: [id number]  TODAY'S DATE: 11/03/2023  ADMIT DATE: 11/2/2023    CHIEF COMPLAINT: Flank Pain (Right, history of kidney stones) and Urinary Retention    PRINCIPLE PROBLEM: Pyelonephritis of right kidney    REASON FOR CONSULT:  ESBL pyelo with stone    ASSESSMENT and PLAN     Severe sepsis secondary to complicated UTI/right distal obstructing calculus status post emergent cystoscopy with stone basket extraction and right double-J ureteral stent on 11/3, history of E coli ESBL  Blood cultures x2 sets no growth to date, pending final   Urine culture 11/2 GNR, lactose , suspecting ESBL E.coli    COPD exacerbation on O2 by NC 2L    PMHx: bipolar disorder, COPD, bilateral kidney stones, smoker    Recommendations:    Follow blood and urine cultures  Amikacin 1 g IV once   Continue meropenem 1 g IV q.8, dose per creatinine clearance, if creatinine clearance drops <30, please adjust dose to q.12  Please keep Duval catheter in place, as per op-note, keep at least for 72 hours after procedure   CRP and procalcitonin order with a.m. labs  Follow stone pathology  Patient advised to drink plenty of fluids to prevent further stone formation  Anticipating IV antibiotics for at least 10 days from procedure, midline placed  Monitor O2 sats    Discussed with patient, nursing, Dr. Watts    ID will follow over the weekend     Thank you for this consult. Please send Epic secure chat with any questions.    HPI      Alma Rosa Helm is a 68 y.o. female very pleasant, active smoker, as per patient has decreased to 2-3 cigarettes/day, bipolar disorder, COPD, bilateral kidney stones with ureteral stents.  She is known to our service, last seen by myself in December of 2022 for ESBL E coli UTI, she was discharged on ertapenem IV completed 10 days and did not follow at the office since she could not come to the clinic due  Office Visit- Urology  Hung Poe 1935 MRN: [id number]      Assessment/Discussion/Plan    88 y.o. male managed by     Elevated PSA  -Patient with a longstanding history of elevated PSA  -Last had a PSA of 5.2 and then subsequently 4.2  -Patient had updated PSA which returned at 9.3 with a total free PSA percentage of 12.2%  DARREN today with no palpable concern for prostate cancer-  -I discussed the possible integration of this with patient and wife.  I had an extensive review of prostate screening guidelines per the American urologic Association and that after the age of 70 AUA does not recommend prostate cancer screening due to risk of overdiagnosis/overtreatment.  With a PSA of 9.3 his PSA is significantly elevated.  I offered obtainment of a repeat PSA as well as multiparametric MRI of the prostate to determine if there are any PI-RADS lesions and for use in MRI fusion transperineal biopsy if clinically warranted.  I also discussed proceeding with further evaluation due to the risk of overdiagnosis/overtreatment but patient and spouse are in agreement that they would like to pursue further testing if repeat PSA returns back significantly elevated.  -Will call patient to result with PSA  -If PSA elevated will obtain MRI of the prostate.  Patient's PSA dropped on the baseline of 4-5 will continue to observe.  -follow up after MRI    2.  BPH with lower urinary tract symptoms  -Discussed pharmacotherapy with patient defers until workup as described above is completed    Chief Complaint:   Hung is a 88 y.o. male presenting to the office for a follow up visit regarding elevated PSA        Subjective    Patient is a 88-year-old gentleman with a history of BPH with no significant urinary tract symptoms and elevated PSA who presents to the office for follow-up.  Last seen in the office in November 2021.  At this point in time his PSA was measuring 4.2 from a 5.2 previously ordered.  Had a prostate exam with no  nodularity noted.  He was advised to follow-up on an as-needed basis.  3 presents to the office today due to concern for low urinary tract symptoms such as frequency, urgency, urge incontinence.  No dysuria or gross hematuria.  He also obtained a updated PSA which returned significantly elevated at 9.3 with a total free PSA percentage of 12.2%.  No known family history of prostate cancer.  Patient has not had a biopsy before          ROS:   Review of Systems   Constitutional: Negative.  Negative for chills, fatigue and fever.   HENT: Negative.     Respiratory:  Negative for shortness of breath.    Cardiovascular:  Negative for chest pain.   Gastrointestinal: Negative.  Negative for abdominal pain.   Endocrine: Negative.    Musculoskeletal: Negative.    Skin: Negative.    Neurological: Negative.  Negative for dizziness and light-headedness.   Hematological: Negative.    Psychiatric/Behavioral: Negative.           Past Medical History  Past Medical History:   Diagnosis Date    Chronic kidney disease     Hypertension     Sleep apnea     no CPAP        Past Surgical History  Past Surgical History:   Procedure Laterality Date    CARPAL TUNNEL RELEASE      COLONOSCOPY      GA ARTHROCENTESIS ASPIR&/INJ MAJOR JT/BURSA W/O US  6/14/2018    Procedure: ASPIRATION RIGHT KNEE BURSA;  Surgeon: Billy Villalobos MD;  Location: [facility];  Service: Orthopedics    GA ARTHRP KNE CONDYLE&PLATU MEDIAL&LAT COMPARTMENTS Left 6/14/2018    Procedure: ARTHROPLASTY KNEE TOTAL;  Surgeon: Billy Villalobos MD;  Location[facility];  Service: Orthopedics    GA ARTHRP KNE CONDYLE&PLATU MEDIAL&LAT COMPARTMENTS Right 6/16/2021    Procedure: ARTHROPLASTY KNEE TOTAL;  Surgeon: Billy Villalobos MD;  Location: [facility];  Service: Orthopedics       Past Family History  Family History   Problem Relation Age of Onset    No Known Problems Father        Past Social history  Social History     Socioeconomic History    Marital status: /Civil Union      to her job as a CNA.  She reports she started feeling ill since Tuesday 10/31, she had sharp right flank pain with associated suprapubic tenderness, dysuria, increased frequency, urgency, incontinence, nausea and multiple episodes of emesis at home.  She also mentions subjective chills, did not check temperature at home.  She follows with urologist at [facility] but has not seen him recently.    In the ER, tachycardic, stable BP, T-max 99.1°  Labs on admission, leukocytosis 20.2, left shift 85%, H&H 16.9/50.3, MCV 81, platelet count 353   Normal kidney and liver function tests  UA orange/hazy, pyuria greater than 100, culture with GNR, lactose , suspecting ESBL E coli   Blood cultures x2 sets no growth to date, pending final   CTRSS revealed right UVJ 3 x 5 mm calculus causing moderate right hydronephrosis.  Findings suggesting medullary nephrocalcinosis.  Hepatic steatosis.  Cholelithiasis.  Diverticulosis.    Empirically started on meropenem IV.    Patient seen examined at bedside, she reports she feels better today after intervention overnight, her right flank pain is almost resolved, her appetite is improving, Duavl orange urine.  Patient with audible wheezing from the distance, complaining of productive cough of white/thick phlegm.  She reports her nausea is also better.    Outdoor activities:  Lives at home by herself.  Active smoker, no alcohol.  Used to work as a CNA, currently not working.  Travel:  None  Implants:  None  Antibiotic history:  See HPI    Past Medical History:   Diagnosis Date    Anxiety     Bipolar disorder     COPD, mild     HLD (hyperlipidemia)     HTN (hypertension)     Nephrolithiasis        Past Surgical History:   Procedure Laterality Date    BACK SURGERY      CYSTOSCOPY      CYSTOSCOPY W/ URETERAL STENT PLACEMENT Right 11/3/2023    Procedure: CYSTOSCOPY, WITH URETERAL STENT INSERTION;  Surgeon: Low Staples MD;  Location: [facility] OR;  Service: Urology;  Laterality:  Right;    CYSTOSCOPY WITH CALCULUS EXTRACTION Right 11/3/2023    Procedure: CYSTOSCOPY, WITH CALCULUS REMOVAL;  Surgeon: Low Staples MD;  Location: [facility];  Service: Urology;  Laterality: Right;    HYSTERECTOMY      LITHOTRIPSY      TUBAL LIGATION         Family History   Problem Relation Age of Onset    Cancer Mother     Diabetes Mother     COPD Father     Hypertension Father        Social History     Socioeconomic History    Marital status:    Tobacco Use    Smoking status: Every Day     Current packs/day: 0.00     Average packs/day: 0.8 packs/day for 45.0 years (33.7 ttl pk-yrs)     Types: Cigarettes     Start date: 1976     Last attempt to quit: 4/8/2020     Years since quitting: 3.5    Smokeless tobacco: Never    Tobacco comments:     now smoking 1/4 ppd   Substance and Sexual Activity    Alcohol use: Not Currently    Drug use: No     Social Determinants of Health     Financial Resource Strain: Unknown (12/16/2022)    Overall Financial Resource Strain (CARDIA)     Difficulty of Paying Living Expenses: Patient refused   Food Insecurity: Unknown (12/16/2022)    Hunger Vital Sign     Worried About Running Out of Food in the Last Year: Patient refused     Ran Out of Food in the Last Year: Patient refused   Transportation Needs: Unknown (12/16/2022)    PRAPARE - Transportation     Lack of Transportation (Medical): Patient refused     Lack of Transportation (Non-Medical): Patient refused   Physical Activity: Unknown (12/16/2022)    Exercise Vital Sign     Days of Exercise per Week: Patient refused     Minutes of Exercise per Session: Patient refused   Stress: Unknown (12/16/2022)    [place] [employer] - Occupational Stress Questionnaire     Feeling of Stress : Patient refused   Social Connections: Unknown (12/16/2022)    Social Connection and Isolation Panel [NHANES]     Frequency of Communication with Friends and Family: Patient refused     Frequency of Social Gatherings with  Spouse name: Not on file    Number of children: Not on file    Years of education: Not on file    Highest education level: Not on file   Occupational History    Not on file   Tobacco Use    Smoking status: Former    Smokeless tobacco: Never    Tobacco comments:     quit 50 years ago    Vaping Use    Vaping status: Never Used   Substance and Sexual Activity    Alcohol use: Not Currently    Drug use: No    Sexual activity: Not on file   Other Topics Concern    Not on file   Social History Narrative    Not on file     Social Determinants of Health     Financial Resource Strain: Not on file   Food Insecurity: Not on file   Transportation Needs: Not on file   Physical Activity: Not on file   Stress: Not on file   Social Connections: Not on file   Intimate Partner Violence: Not on file   Housing Stability: Not on file       Current Medications  Current Outpatient Medications   Medication Sig Dispense Refill    gabapentin (NEURONTIN) 100 mg capsule Take 100 mg by mouth 2 (two) times a day      lisinopril-hydrochlorothiazide (PRINZIDE,ZESTORETIC) 20-25 MG per tablet Take 1 tablet by mouth daily      LUMIGAN 0.01 % ophthalmic drops Administer into the left eye daily at bedtime       pyridostigmine (MESTINON) 60 mg tablet Take 0.5 tablets (30 mg total) by mouth 3 (three) times a day 270 tablet 1    aspirin 81 mg chewable tablet Chew daily  (Patient not taking: Reported on 11/15/2023)      docusate sodium (COLACE) 100 mg capsule Take 1 capsule (100 mg total) by mouth 2 (two) times a day (Patient not taking: Reported on 9/10/2021) 10 capsule 0    enoxaparin (LOVENOX) 40 mg/0.4 mL Inject 0.4 mL (40 mg total) under the skin daily (Patient not taking: Reported on 9/10/2021) 28 mL 0    oxyCODONE (ROXICODONE) 5 mg immediate release tablet 1 pill po Q6 Hrs prn severe pain (Patient not taking: Reported on 9/10/2021) 20 tablet 0    triamcinolone (KENALOG) 0.1 % ointment Apply topically twice a day for up to 14 days to active areas as  "needed. May repeat course after taking 1 week break. Do not use on face, groin, armpits. Do not use on open skin (Patient not taking: Reported on 4/18/2024) 453.6 g 1     No current facility-administered medications for this visit.       Allergies  No Known Allergies    OBJECTIVE    Vitals   Vitals:    04/18/24 1455   BP: 110/60   BP Location: Left arm   Patient Position: Sitting   Cuff Size: Adult   Pulse: 86   SpO2: 93%   Weight: 134 kg (295 lb)   Height: 5' 9\" (1.753 m)       PVR:    Physical Exam  Constitutional:       General: He is not in acute distress.     Appearance: Normal appearance. He is normal weight. He is not ill-appearing or toxic-appearing.   HENT:      Head: Normocephalic and atraumatic.   Eyes:      Conjunctiva/sclera: Conjunctivae normal.   Cardiovascular:      Rate and Rhythm: Normal rate.   Pulmonary:      Effort: Pulmonary effort is normal. No respiratory distress.   Genitourinary:     Comments: On prostate semination today I do not appreciate any overt nodularity or asymmetry  Skin:     General: Skin is warm and dry.   Neurological:      General: No focal deficit present.      Mental Status: He is alert and oriented to person, place, and time.      Cranial Nerves: No cranial nerve deficit.   Psychiatric:         Mood and Affect: Mood normal.         Behavior: Behavior normal.         Thought Content: Thought content normal.          Labs:     Lab Results   Component Value Date    PSA 9.3 (H) 04/12/2024    PSA 4.3 (H) 10/23/2020     Lab Results   Component Value Date    CREATININE 1.56 (H) 04/15/2024      Lab Results   Component Value Date    HGBA1C 5.8 (H) 05/21/2021     Lab Results   Component Value Date    CALCIUM 9.6 04/15/2024    K 4.4 04/15/2024    CO2 28 04/15/2024     04/15/2024    BUN 30 (H) 04/15/2024    CREATININE 1.56 (H) 04/15/2024       I have personally reviewed all pertinent lab results and reviewed with patient    Imaging       Ron Fisher PA-C  Date: 4/18/2024 " Friends and Family: Patient refused     Attends Rastafari Services: Patient refused     Active Member of Clubs or Organizations: Patient refused     Attends Club or Organization Meetings: Patient refused     Marital Status: Patient refused   Housing Stability: Unknown (12/16/2022)    Housing Stability Vital Sign     Unable to Pay for Housing in the Last Year: Patient refused     Unstable Housing in the Last Year: Patient refused       Review of patient's allergies indicates:   Allergen Reactions    Tramadol Swelling    Imitrex [sumatriptan succinate]     Penicillins     Sulfa (sulfonamide antibiotics)     Toradol [ketorolac] Swelling       Current Outpatient Medications   Medication Instructions    albuterol (PROVENTIL/VENTOLIN HFA) 90 mcg/actuation inhaler 1-2 puffs, Inhalation, Every 4 hours PRN, Rescue    albuterol-ipratropium (DUO-NEB) 2.5 mg-0.5 mg/3 mL nebulizer solution 3 mLs, Nebulization, Every 6 hours PRN, Rescue    alendronate (FOSAMAX) 70 MG tablet Take one tablet weekly.    cyclobenzaprine (FLEXERIL) 5 mg, Oral, 3 times daily PRN    fluticasone-umeclidin-vilanter (TRELEGY ELLIPTA) 100-62.5-25 mcg DsDv 1 puff, Inhalation, Daily    hydroCHLOROthiazide (HYDRODIURIL) 12.5 mg, Oral, Daily    HYDROcodone-acetaminophen (NORCO)  mg per tablet 1 tablet, Oral, 2 times daily PRN    HYDROcodone-acetaminophen (NORCO) 5-325 mg per tablet Take 2 tablets every 8 hours by oral route as needed for 30 days.    hydrOXYzine pamoate (VISTARIL) 25 mg, Oral, Nightly    metoprolol tartrate (LOPRESSOR) 25 mg, Oral, Daily    mupirocin (BACTROBAN) 2 % ointment mupirocin 2 % topical ointment   APPLY TO THE AFFECTED AREA OF THE TOENAIL TWICE DAILY FOR 3 WEEKS    ondansetron (ZOFRAN-ODT) 8 mg, Oral, Every 8 hours PRN    oxybutynin (DITROPAN-XL) 5 mg, Oral, Daily    pravastatin (PRAVACHOL) 40 mg, Oral, Nightly    pregabalin (LYRICA) 50 mg, Oral, 3 times daily    sertraline (ZOLOFT) 50 mg, Oral, Daily         Review of Systems     Constitutional:  Subjective fever and chills,   HEENT: Denies visual changes, ear pain, sinus congestion, mouth pain or trouble swallowing, sore throat or dental pain.  Neck: Denies neck pain or lumps.  Respiratory:  Shortness of breath, productive cough, thick white phlegm  Cardiovascular:  Denies chest pain, palpitations or edema.  Gastrointestinal: + nausea, vomiting, no constipation or diarrhea.  Genitourinary:  R flank pain, dysuria, frequency, urgency, hesitancy, incontinence   Musculoskeletal:  Denies joint pain or swelling, difficulty walking.    Skin:  Denies rash or itching.  VAD:  Left midline  Neurologic:  Denies motor sensory loss, headaches or dizziness.    Psychiatric:  Denies changes in mood or behavior.       OBJECTIVE     Temp:  [97.5 °F (36.4 °C)-99.6 °F (37.6 °C)] 97.9 °F (36.6 °C)  Pulse:  [] 74  Resp:  [17-22] 18  SpO2:  [93 %-97 %] 96 %  BP: (100-154)/(42-65) 114/62         Physical Exam      General:   woman lying in bed, ill-appearing, breathing comfortable on O2 by NC 2 L  Eyes: Eyes with no icterus or injection. Vision grossly normal  Ears: Hearing grossly normal.  Nose: Nares patent  Mouth: Moist mucous membranes, edentulous. No ulcerations, erythema or exudates.  Neck: Supple, no tenderness to palpation.  Cardiovascular: Regular rate and rhythm, no murmurs, no edema.    Respiratory:  Diffuse bilateral expiratory wheezing  Gastrointestinal:  Soft with active bowel sounds, no tenderness to palpation, no distention.  Genitourinary:  No suprapubic tenderness. Mild R CVA, is improved as per patient   Musculoskeletal:  Moves all extremities with good strength.    Skin:  Warm and dry, no obvious rashes.    Neuro:   Oriented, conversant, follows commands.  Psych: Good mood, normal affect.     Wounds: None   VAD: L Midline   ISOLATION: Contact/ESBL    CBC LAST 7  Recent Labs   Lab 11/02/23  1311 11/03/23  0230   WBC 20.29* 20.83*   RBC 6.24* 5.53*   HGB 16.9* 15.1   HCT 50.3*  Time: 3:08 PM  [facility]    This note was written using fluency dictation software. Please excuse any resulting minor grammatical errors.       "45.4   MCV 81* 82   MCH 27.1 27.3   MCHC 33.6 33.3   RDW 15.1* 14.7*    287   MPV 9.2 9.6   GRAN 85.0*  17.3* 85.2*  17.8*   LYMPH 7.8*  1.6 5.9*  1.2   MONO 6.4  1.3* 8.1  1.7*   BASO 0.04 0.04   NRBC 0 0       CHEMISTRY LAST 7  Recent Labs   Lab 11/02/23  1311 11/03/23  0230   * 135*   K 3.8 4.5    104   CO2 21* 25   ANIONGAP 8 6*   BUN 15 18   CREATININE 1.0 1.2   * 182*   CALCIUM 10.4 9.7   MG  --  1.8   ALBUMIN 4.3 3.8   PROT 8.6* 7.4   ALKPHOS 98 79   ALT 17 14   AST 13 11   BILITOT 0.9 1.1*       Estimated Creatinine Clearance: 46.7 mL/min (based on SCr of 1.2 mg/dL).    INFLAMMATORY/PROCAL  LAST 7  No results for input(s): "PROCAL", "ESR", "CRP" in the last 168 hours.  No results found for: "ESR"  CRP   Date Value Ref Range Status   12/16/2022 29.2 (H) 0.0 - 8.2 mg/L Final       PRIOR  MICROBIOLOGY:    Susceptibility data from last 90 days.  Collected Specimen Info Organism   11/02/23 Urine Lactose Fermenting         LAST 7 DAYS MICROBIOLOGY   Microbiology Results (last 7 days)       Procedure Component Value Units Date/Time    Urine culture [[id number]]  (Abnormal) Collected: 11/02/23 1258    Order Status: Completed Specimen: Urine Updated: 11/03/23 1036     Urine Culture, Routine GRAM NEGATIVE RAY, LACTOSE   >100,000 cfu/ml  Identification and susceptibility pending      Narrative:      Specimen Source->Urine    Blood Culture #1 **CANNOT BE ORDERED STAT** [[id number]] Collected: 11/02/23 1820    Order Status: Completed Specimen: Blood Updated: 11/03/23 0117     Blood Culture, Routine No Growth to date    Blood Culture #2 **CANNOT BE ORDERED STAT** [[id number]] Collected: 11/02/23 1830    Order Status: Completed Specimen: Blood Updated: 11/03/23 0117     Blood Culture, Routine No Growth to date              CURRENT/PREVIOUS VISIT EKG  Results for orders placed or performed during the hospital encounter of 11/02/23   EKG 12-lead    Collection Time: 11/02/23  7:15 PM "    Narrative    Test Reason : Z01.818,    Vent. Rate : 096 BPM     Atrial Rate : 096 BPM     P-R Int : 142 ms          QRS Dur : 080 ms      QT Int : 456 ms       P-R-T Axes : 071 088 040 degrees     QTc Int : 576 ms    Normal sinus rhythm  Biatrial enlargement  Prolonged QT  Abnormal ECG  When compared with ECG of 24-OCT-2022 11:39,  Nonspecific T wave abnormality now evident in Inferior leads  QT has lengthened    Referred By: [email or web address]   SELF           Confirmed By:        Significant Labs: All pertinent labs within the past 24 hours have been reviewed.     Significant Imaging: I have reviewed all relevant and available imaging results/findings within the past 24 hours.    CT renal stone study:   1. Right UVJ 3 x 5 mm calculus causing moderate right hydronephrosis.   2. Findings suggesting medullary nephrocalcinosis.   3. Hepatic steatosis.   4. Cholelithiasis.   5. Diverticulosis.     Ivette Herrera MD  Date of Service: 11/03/2023  2:56 PM

## 2024-04-19 ENCOUNTER — APPOINTMENT (OUTPATIENT)
Dept: LAB | Facility: MEDICAL CENTER | Age: 89
End: 2024-04-19
Payer: COMMERCIAL

## 2024-04-19 ENCOUNTER — HOSPITAL ENCOUNTER (OUTPATIENT)
Dept: RADIOLOGY | Facility: MEDICAL CENTER | Age: 89
Discharge: HOME | End: 2024-04-19
Payer: COMMERCIAL

## 2024-04-19 DIAGNOSIS — G70.00 MYASTHENIA GRAVIS (HCC): ICD-10-CM

## 2024-04-19 DIAGNOSIS — R97.20 ELEVATED PSA: ICD-10-CM

## 2024-04-19 LAB
ANION GAP SERPL CALCULATED.3IONS-SCNC: 8 MMOL/L (ref 4–13)
BUN SERPL-MCNC: 26 MG/DL (ref 5–25)
CALCIUM SERPL-MCNC: 9.8 MG/DL (ref 8.4–10.2)
CHLORIDE SERPL-SCNC: 103 MMOL/L (ref 96–108)
CO2 SERPL-SCNC: 28 MMOL/L (ref 21–32)
CREAT SERPL-MCNC: 1.33 MG/DL (ref 0.6–1.3)
GFR SERPL CREATININE-BSD FRML MDRD: 48 ML/MIN/1.73SQ M
GLUCOSE SERPL-MCNC: 127 MG/DL (ref 65–140)
POTASSIUM SERPL-SCNC: 4.4 MMOL/L (ref 3.5–5.3)
PSA SERPL-MCNC: 8.29 NG/ML (ref 0–4)
SODIUM SERPL-SCNC: 139 MMOL/L (ref 135–147)

## 2024-04-19 PROCEDURE — 36415 COLL VENOUS BLD VENIPUNCTURE: CPT

## 2024-04-19 PROCEDURE — 71260 CT THORAX DX C+: CPT

## 2024-04-19 PROCEDURE — 84153 ASSAY OF PSA TOTAL: CPT

## 2024-04-19 PROCEDURE — 80048 BASIC METABOLIC PNL TOTAL CA: CPT

## 2024-04-19 RX ADMIN — IOHEXOL 85 ML: 350 INJECTION, SOLUTION INTRAVENOUS at 14:18

## 2024-04-26 ENCOUNTER — TELEPHONE (OUTPATIENT)
Age: 89
End: 2024-04-26

## 2024-04-26 NOTE — TELEPHONE ENCOUNTER
Called to pt. Left voice message for pt to give the office a call back. Call back number provided.

## 2024-04-26 NOTE — TELEPHONE ENCOUNTER
PT wife called and requesting call back to review recent lab results     Pt call [phone] Heidi (wife)

## 2024-04-26 NOTE — TELEPHONE ENCOUNTER
Spoke with pt's wife, Heidi and relayed the following message:    Ron Fisher PA-C Physician Assistant Signed 11:47 AM     Copy     Please call patient to inform that his PSA is still elevated at 8.  I think it still reasonable to proceed with a MRI of the prostate at this point in time          *She informed me her  had moved back his MRI until 5/29/24. Will monitor for results.

## 2024-04-26 NOTE — TELEPHONE ENCOUNTER
Pt wife calling for PSA results and to see if they need to continue and do the MRI of the prostate  MRI I scheduled for 5/1/24    Please review and call pt wife [phone]

## 2024-04-26 NOTE — TELEPHONE ENCOUNTER
Please call patient to inform that his PSA is still elevated at 8.  I think it still reasonable to proceed with a MRI of the prostate at this point in time

## 2024-04-29 ENCOUNTER — TELEPHONE (OUTPATIENT)
Dept: NEUROLOGY | Facility: CLINIC | Age: 89
End: 2024-04-29

## 2024-04-29 NOTE — TELEPHONE ENCOUNTER
Attempted to call pt to confirmed appt for 5/1 with Dr Hernandez in [place] office, however no answer and and voice mail, unable to LMOM. Pt has not logged onto his [email or web address] account since 2021

## 2024-04-30 ENCOUNTER — TELEPHONE (OUTPATIENT)
Dept: NEUROLOGY | Facility: CLINIC | Age: 89
End: 2024-04-30

## 2024-05-08 ENCOUNTER — OFFICE VISIT (OUTPATIENT)
Dept: NEUROLOGY | Facility: CLINIC | Age: 89
End: 2024-05-08
Payer: COMMERCIAL

## 2024-05-08 VITALS
DIASTOLIC BLOOD PRESSURE: 70 MMHG | HEART RATE: 70 BPM | WEIGHT: 295 LBS | BODY MASS INDEX: 43.56 KG/M2 | SYSTOLIC BLOOD PRESSURE: 122 MMHG

## 2024-05-08 DIAGNOSIS — G70.00 MYASTHENIA GRAVIS (HCC): Primary | ICD-10-CM

## 2024-05-08 PROCEDURE — 99215 OFFICE O/P EST HI 40 MIN: CPT | Performed by: PSYCHIATRY & NEUROLOGY

## 2024-05-08 PROCEDURE — G2211 COMPLEX E/M VISIT ADD ON: HCPCS | Performed by: PSYCHIATRY & NEUROLOGY

## 2024-05-08 RX ORDER — PYRIDOSTIGMINE BROMIDE 60 MG/1
60 TABLET ORAL 4 TIMES DAILY
Qty: 120 TABLET | Refills: 3 | Status: SHIPPED | OUTPATIENT
Start: 2024-05-08 | End: 2024-05-08

## 2024-05-08 RX ORDER — PYRIDOSTIGMINE BROMIDE 60 MG/1
60 TABLET ORAL 4 TIMES DAILY
Qty: 120 TABLET | Refills: 3 | Status: SHIPPED | OUTPATIENT
Start: 2024-05-08 | End: 2024-09-05

## 2024-05-08 NOTE — PROGRESS NOTES
"Please note: this note was created with voice recognition software. Occasional wrong word or \"sound alike\" substitutions may occur due to the inherent limitations of voice recognition software. Read the chart carefully and recognize (using context) where substitutions may have occurred. I am available to discuss any questions.       1. Myasthenia gravis (HCC)  Assessment & Plan:  Mr Poe has fluctuating right-sided ptosis.  A vascular process was considered but was excluded.  Acetylcholine receptor antibody titers were abnormal, definitively establishing the diagnosis of myasthenia gravis.  CT of the chest was performed which showed no mediastinal pathology; thymoma is present in approximately one third of cases which he does not have.  No other testing is required at this time.  I had a long discussion with him and his wife about the pathophysiology, natural history, and potential treatments of myasthenia gravis.  Most cases generalized with time although I was not able to find evidence of generalized disease in his case.  Therefore, I think that the best course of action is to continue him on pyridostigmine and not immunosuppressant or immunomodulatory treatments, although these may be required with time.  I have told him to increase his dose of pyridostigmine to 60 mg every 4 hours while awake but warned him to watch for gastrointestinal side effects.  Should these occur I would want him to go down to 30 mg every 4 hours while awake.  We discussed the importance of avoiding heat, infection, and numerous medications that might exacerbate myasthenia gravis.  I told him to make sure that he keeps his urologic follow-up considering the concern for potential stent underlying prostate malignancy.  I will have him return for reassessment in 2 months, or sooner if needed.  His MG ADL score today was 2.  Time spent: 60 minutes    Orders:  -     pyridostigmine (Mestinon) 60 mg tablet; Take 1 tablet (60 mg total) by mouth " 4 (four) times a day        Problem List Items Addressed This Visit       Myasthenia gravis (HCC) - Primary     Mr Lui has fluctuating right-sided ptosis.  A vascular process was considered but was excluded.  Acetylcholine receptor antibody titers were abnormal, definitively establishing the diagnosis of myasthenia gravis.  CT of the chest was performed which showed no mediastinal pathology; thymoma is present in approximately one third of cases which he does not have.  No other testing is required at this time.  I had a long discussion with him and his wife about the pathophysiology, natural history, and potential treatments of myasthenia gravis.  Most cases generalized with time although I was not able to find evidence of generalized disease in his case.  Therefore, I think that the best course of action is to continue him on pyridostigmine and not immunosuppressant or immunomodulatory treatments, although these may be required with time.  I have told him to increase his dose of pyridostigmine to 60 mg every 4 hours while awake but warned him to watch for gastrointestinal side effects.  Should these occur I would want him to go down to 30 mg every 4 hours while awake.  We discussed the importance of avoiding heat, infection, and numerous medications that might exacerbate myasthenia gravis.  I told him to make sure that he keeps his urologic follow-up considering the concern for potential stent underlying prostate malignancy.  I will have him return for reassessment in 2 months, or sooner if needed.  His MG ADL score today was 2.  Time spent: 60 minutes         Relevant Medications    pyridostigmine (Mestinon) 60 mg tablet       Problem List       Aftercare following left knee joint replacement surgery    Chronic pain of left knee    Class 3 severe obesity due to excess calories without serious comorbidity with body mass index (BMI) of 40.0 to 44.9 in adult (HCC)    Effusion of right knee    Essential  "hypertension    Myasthenia gravis (HCC)    Current Assessment & Plan     Mr Poe has fluctuating right-sided ptosis.  A vascular process was considered but was excluded.  Acetylcholine receptor antibody titers were abnormal, definitively establishing the diagnosis of myasthenia gravis.  CT of the chest was performed which showed no mediastinal pathology; thymoma is present in approximately one third of cases which he does not have.  No other testing is required at this time.  I had a long discussion with him and his wife about the pathophysiology, natural history, and potential treatments of myasthenia gravis.  Most cases generalized with time although I was not able to find evidence of generalized disease in his case.  Therefore, I think that the best course of action is to continue him on pyridostigmine and not immunosuppressant or immunomodulatory treatments, although these may be required with time.  I have told him to increase his dose of pyridostigmine to 60 mg every 4 hours while awake but warned him to watch for gastrointestinal side effects.  Should these occur I would want him to go down to 30 mg every 4 hours while awake.  We discussed the importance of avoiding heat, infection, and numerous medications that might exacerbate myasthenia gravis.  I told him to make sure that he keeps his urologic follow-up considering the concern for potential stent underlying prostate malignancy.  I will have him return for reassessment in 2 months, or sooner if needed.  His MG ADL score today was 2.  Time spent: 60 minutes         Prepatellar bursitis of right knee    Primary osteoarthritis of left knee    Right knee pain    S/P total knee arthroplasty, right    Stage 3a chronic kidney disease (HCC)         I had the pleasure of seeing Hung Poe, a 88 y.o. male, for neuromuscular consultation. The referral was for myasthenia gravis.     Several months ago \"my eye went closed\".  He and his wife stated only the right " "was involved although the medical records suggest that there had been left-sided involvement in the past.  With time his droopy eye has become constant but fluctuates in intensity.  He has not noticed any particular time of the day that he is more likely to be affected.  He reports that his vision is \"blurry\" but with further questioning he refers to difficulty seeing through his droopy eyelid.  He has a tendency to hold his eyelid open with his fingers but he very specifically denies diplopia or blurring of images.  He and his wife have not noticed dyspnea, dysarthria, or dysphagia.  He has a history of bilateral knee replacement and pain in the feet, and as a result he no longer does the lawn work or walks as far.  However, he denies specific fatigue.  There is no activity that he feels that he cannot perform although he has a tendency to hold onto the handrail while he is walking.  Sometimes while he is walking his tongue gets \"stiff\".  Initially there was concern that he might have a vascular cause of his symptoms but CT angiogram of the head and neck were negative.  Laboratory testing was performed which showed elevated titers of acetylcholine receptor binding, blocking, and modulating antibodies.  CT of the chest was performed which showed no mediastinal pathology.  He has been started on pyridostigmine 30 mg 3 times daily but does not notice any change.  His MG ADL score is 2, with points only for moderate ptosis without other symptoms.    He has a history of hypertension and chronic kidney disease.  There is a remote history of tobacco use but no history of substance abuse.      Past Medical History:   Diagnosis Date    Chronic kidney disease     Hypertension     Sleep apnea     no CPAP        Past Surgical History:   Procedure Laterality Date    CARPAL TUNNEL RELEASE      COLONOSCOPY      AL ARTHROCENTESIS ASPIR&/INJ MAJOR JT/BURSA W/O US  6/14/2018    Procedure: ASPIRATION RIGHT KNEE BURSA;  Surgeon: " Billy Villalobos MD;  Location: [facility];  Service: Orthopedics    LA ARTHRP KNE CONDYLE&PLATU MEDIAL&LAT COMPARTMENTS Left 6/14/2018    Procedure: ARTHROPLASTY KNEE TOTAL;  Surgeon: Billy Villalobos MD;  Location: [facility];  Service: Orthopedics    LA ARTHRP KNE CONDYLE&PLATU MEDIAL&LAT COMPARTMENTS Right 6/16/2021    Procedure: ARTHROPLASTY KNEE TOTAL;  Surgeon: Billy Villalobos MD;  Location: [facility];  Service: Orthopedics       Patient has no known allergies.    Social History     Tobacco Use   Smoking Status Former   Smokeless Tobacco Never   Tobacco Comments    quit 50 years ago        Social History     Substance and Sexual Activity   Alcohol Use Not Currently       Social History     Substance and Sexual Activity   Drug Use No       Family History   Problem Relation Age of Onset    No Known Problems Father          Current Outpatient Medications:     gabapentin (NEURONTIN) 100 mg capsule, Take 100 mg by mouth 2 (two) times a day, Disp: , Rfl:     lisinopril-hydrochlorothiazide (PRINZIDE,ZESTORETIC) 20-25 MG per tablet, Take 1 tablet by mouth daily, Disp: , Rfl:     LUMIGAN 0.01 % ophthalmic drops, Administer into the left eye daily at bedtime , Disp: , Rfl:     pyridostigmine (Mestinon) 60 mg tablet, Take 1 tablet (60 mg total) by mouth 4 (four) times a day, Disp: 120 tablet, Rfl: 3    aspirin 81 mg chewable tablet, Chew daily  (Patient not taking: Reported on 11/15/2023), Disp: , Rfl:     docusate sodium (COLACE) 100 mg capsule, Take 1 capsule (100 mg total) by mouth 2 (two) times a day (Patient not taking: Reported on 9/10/2021), Disp: 10 capsule, Rfl: 0    enoxaparin (LOVENOX) 40 mg/0.4 mL, Inject 0.4 mL (40 mg total) under the skin daily (Patient not taking: Reported on 9/10/2021), Disp: 28 mL, Rfl: 0    oxyCODONE (ROXICODONE) 5 mg immediate release tablet, 1 pill po Q6 Hrs prn severe pain (Patient not taking: Reported on 9/10/2021), Disp: 20 tablet, Rfl: 0    triamcinolone (KENALOG) 0.1 % ointment,  Apply topically twice a day for up to 14 days to active areas as needed. May repeat course after taking 1 week break. Do not use on face, groin, armpits. Do not use on open skin (Patient not taking: Reported on 4/18/2024), Disp: 453.6 g, Rfl: 1    Appointment on 04/19/2024   Component Date Value Ref Range Status    PSA, Diagnostic 04/19/2024 8.29 (H)  0.00 - 4.00 ng/mL Final    echoecho Access chemiluminescent immunoassay. Confirm baseline values for patients being serially monitored.    Sodium 04/19/2024 139  135 - 147 mmol/L Final    Potassium 04/19/2024 4.4  3.5 - 5.3 mmol/L Final    Chloride 04/19/2024 103  96 - 108 mmol/L Final    CO2 04/19/2024 28  21 - 32 mmol/L Final    ANION GAP 04/19/2024 8  4 - 13 mmol/L Final    BUN 04/19/2024 26 (H)  5 - 25 mg/dL Final    Creatinine 04/19/2024 1.33 (H)  0.60 - 1.30 mg/dL Final    Standardized to IDMS reference method    Glucose 04/19/2024 127  65 - 140 mg/dL Final    If the patient is fasting, the ADA then defines impaired fasting glucose as > 100 mg/dL and diabetes as > or equal to 123 mg/dL.    Calcium 04/19/2024 9.8  8.4 - 10.2 mg/dL Final    eGFR 04/19/2024 48  ml/min/1.73sq m Final   Appointment on 04/15/2024   Component Date Value Ref Range Status    Sodium 04/15/2024 139  135 - 147 mmol/L Final    Potassium 04/15/2024 4.4  3.5 - 5.3 mmol/L Final    Chloride 04/15/2024 102  96 - 108 mmol/L Final    CO2 04/15/2024 28  21 - 32 mmol/L Final    ANION GAP 04/15/2024 9  4 - 13 mmol/L Final    BUN 04/15/2024 30 (H)  5 - 25 mg/dL Final    Creatinine 04/15/2024 1.56 (H)  0.60 - 1.30 mg/dL Final    Standardized to IDMS reference method    Glucose, Fasting 04/15/2024 190 (H)  65 - 99 mg/dL Final    Calcium 04/15/2024 9.6  8.4 - 10.2 mg/dL Final    eGFR 04/15/2024 39  ml/min/1.73sq m Final   Appointment on 04/12/2024   Component Date Value Ref Range Status    Prostate Specific Antigen Total 04/12/2024 9.3 (H)  0.0 - 4.0 ng/mL Final    Roche ECLIA  methodology.  According to the American U[employer] Association, Serum PSA should  decrease and remain at undetectable levels after radical  prostatectomy. The AUA defines biochemical recurrence as an initial  PSA value 0.2 ng/mL or greater followed by a subsequent confirmatory  PSA value 0.2 ng/mL or greater.  Values obtained with different assay methods or kits cannot be used  interchangeably. Results cannot be interpreted as absolute evidence  of the presence or absence of malignant disease.    PSA, Free 04/12/2024 1.13  N/A ng/mL Final    Roche ECLIA methodology.    PSA, Free Pct 04/12/2024 12.2  % Final    The table below lists the probability of prostate cancer for  men with non-suspicious DARREN results and total PSA between  4 and 10 ng/mL, by patient age (Cristina et al, JONATAN 1998,  279:1542).                    % Free PSA       50-64 yr        65-75 yr                    0.00-10.00%        56%             55%                   10.01-15.00%        24%             35%                   15.01-20.00%        17%             23%                   20.01-25.00%        10%             20%                        >25.00%         5%              9%  Please note:  Cristina et al did not make specific                recommendations regarding the use of                percent free PSA for any other population                of men.   Appointment on 12/13/2023   Component Date Value Ref Range Status    AChR Blocking Abs, Serum 12/13/2023 105 (H)  0 - 25 % Final                                   Negative:      0 - 25                                 Borderline:   26 - 30                                 Positive:         >30    AChR Binding Ab, Serum 12/13/2023 1.50 (H)  0.00 - 0.24 nmol/L Final                                   Negative:   0.00 - 0.24                                 Borderline: 0.25 - 0.40                                 Positive:         >0.40    AChR Modulating Abs 12/13/2023 78 (H)  0 - 45 % Final                            Interpretive Information:                          Negative:             0 - 45%                          Positive:               > 45%  No single value for AChR-modulating antibody should  be used as a sole basis for diagnosis or response  to therapy.   Admission on 11/28/2023, Discharged on 11/28/2023   Component Date Value Ref Range Status    WBC 11/28/2023 5.96  4.31 - 10.16 Thousand/uL Final    RBC 11/28/2023 4.57  3.88 - 5.62 Million/uL Final    Hemoglobin 11/28/2023 15.2  12.0 - 17.0 g/dL Final    Hematocrit 11/28/2023 46.1  36.5 - 49.3 % Final    MCV 11/28/2023 101 (H)  82 - 98 fL Final    MCH 11/28/2023 33.3  26.8 - 34.3 pg Final    MCHC 11/28/2023 33.0  31.4 - 37.4 g/dL Final    RDW 11/28/2023 12.6  11.6 - 15.1 % Final    MPV 11/28/2023 9.3  8.9 - 12.7 fL Final    Platelets 11/28/2023 194  149 - 390 Thousands/uL Final    nRBC 11/28/2023 0  /100 WBCs Final    Segmented % 11/28/2023 62  43 - 75 % Final    Immature Grans % 11/28/2023 0  0 - 2 % Final    Lymphocytes % 11/28/2023 25  14 - 44 % Final    Monocytes % 11/28/2023 9  4 - 12 % Final    Eosinophils Relative 11/28/2023 3  0 - 6 % Final    Basophils Relative 11/28/2023 1  0 - 1 % Final    Absolute Neutrophils 11/28/2023 3.72  1.85 - 7.62 Thousands/µL Final    Absolute Immature Grans 11/28/2023 0.02  0.00 - 0.20 Thousand/uL Final    Absolute Lymphocytes 11/28/2023 1.47  0.60 - 4.47 Thousands/µL Final    Absolute Monocytes 11/28/2023 0.52  0.17 - 1.22 Thousand/µL Final    Eosinophils Absolute 11/28/2023 0.18  0.00 - 0.61 Thousand/µL Final    Basophils Absolute 11/28/2023 0.05  0.00 - 0.10 Thousands/µL Final    Sodium 11/28/2023 139  135 - 147 mmol/L Final    Potassium 11/28/2023 3.9  3.5 - 5.3 mmol/L Final    Chloride 11/28/2023 103  96 - 108 mmol/L Final    CO2 11/28/2023 27  21 - 32 mmol/L Final    ANION GAP 11/28/2023 9  mmol/L Final    BUN 11/28/2023 30 (H)  5 - 25 mg/dL Final    Creatinine 11/28/2023 1.51 (H)  0.60 - 1.30 mg/dL Final     Standardized to IDMS reference method    Glucose 11/28/2023 100  65 - 140 mg/dL Final    If the patient is fasting, the ADA then defines impaired fasting glucose as > 100 mg/dL and diabetes as > or equal to 123 mg/dL.    Calcium 11/28/2023 10.0  8.4 - 10.2 mg/dL Final    AST 11/28/2023 14  13 - 39 U/L Final    ALT 11/28/2023 12  7 - 52 U/L Final    Specimen collection should occur prior to Sulfasalazine administration due to the potential for falsely depressed results.     Alkaline Phosphatase 11/28/2023 68  34 - 104 U/L Final    Total Protein 11/28/2023 7.6  6.4 - 8.4 g/dL Final    Albumin 11/28/2023 4.4  3.5 - 5.0 g/dL Final    Total Bilirubin 11/28/2023 0.81  0.20 - 1.00 mg/dL Final    Use of this assay is not recommended for patients undergoing treatment with eltrombopag due to the potential for falsely elevated results.  N-acetyl-p-benzoquinone imine (metabolite of Acetaminophen) will generate erroneously low results in samples for patients that have taken an overdose of Acetaminophen.    eGFR 11/28/2023 41  ml/min/1.73sq m Final    Lyme Total Antibodies 11/28/2023 Negative  Negative Final        No results found for this or any previous visit.     No results found for this or any previous visit.    No results found for this or any previous visit.    No results found for this or any previous visit.    No results found for this or any previous visit.    No results found for this or any previous visit.    No results found for this or any previous visit.    No results found for this or any previous visit.    No results found for this or any previous visit.    No results found for this or any previous visit.    No results found for this or any previous visit.    No results found for this or any previous visit.      Review of Systems      On examination,     Blood pressure 122/70, pulse 70, weight 134 kg (295 lb).    Well developed, well nourished, in no acute distress    Normocephalic, atraumatic    Heart: regular  rate and rhythm  I did not hear a carotid bruit    Extremities: no clubbing, cyanosis, or edema    Speech and cognition appeared normal    Cranial nerves:  II: Pupils equal, round, and reactive to light. No gross visual field defect. I did not appreciate optic disc edema.  III, IV, VI: Extraocular movements intact. Constant right ptosis that was worse after the motor exam. Cogan's lid twitch sign present. No diplopia, even after the motor exam  V: Normal facial sensation in all three divisions of the trigeminal nerve bilaterally  VII: Normal facial strength  VIII: Hearing intact to finger rub bilaterally  IX, X: Palate elevated symmetrically  XI: Sternocleidomastoid strength normal bilaterally  XII: Tongue protruded in midline without atrophy or fibrillations    Motor:  Normal tone and bulk throughout.   Muscle strength testing by the MRC scale was 5/5 in the deltoid, biceps, triceps, wrist extensors, wrist flexors, finger extensors, finger flexors,. Hip flexors, quadriceps, ankle dorsiflexors, ankle plantar flexors, and EHL bilaterally  Normal neck flexors; no fatigueability    Deep tendon reflexes:   2+ and symmetrical in the biceps, triceps, brachioradialis, patellas, and ankles  Toes downgoing (no Babinski sign)  No Loera's sign    Sensation: Normal pinprick and light touch throughout    Cerebellar: normal finger to nose and heel to shin testing    Gait: Normal heel, toe, and tandem gait            There are no Patient Instructions on file for this visit.      Thank you very much for allowing me to participate in your patient's care. Please feel free to contact me for any questions or concerns. Please be aware of the inherent limitations of voice recognition software, which may result in transcriptional errors.    Tino Hernandez MD

## 2024-05-08 NOTE — ASSESSMENT & PLAN NOTE
Mr Poe has fluctuating right-sided ptosis.  A vascular process was considered but was excluded.  Acetylcholine receptor antibody titers were abnormal, definitively establishing the diagnosis of myasthenia gravis.  CT of the chest was performed which showed no mediastinal pathology; thymoma is present in approximately one third of cases which he does not have.  No other testing is required at this time.  I had a long discussion with him and his wife about the pathophysiology, natural history, and potential treatments of myasthenia gravis.  Most cases generalized with time although I was not able to find evidence of generalized disease in his case.  Therefore, I think that the best course of action is to continue him on pyridostigmine and not immunosuppressant or immunomodulatory treatments, although these may be required with time.  I have told him to increase his dose of pyridostigmine to 60 mg every 4 hours while awake but warned him to watch for gastrointestinal side effects.  Should these occur I would want him to go down to 30 mg every 4 hours while awake.  We discussed the importance of avoiding heat, infection, and numerous medications that might exacerbate myasthenia gravis.  I told him to make sure that he keeps his urologic follow-up considering the concern for potential stent underlying prostate malignancy.  I will have him return for reassessment in 2 months, or sooner if needed.  His MG ADL score today was 2.  Time spent: 60 minutes

## 2024-05-09 NOTE — TELEPHONE ENCOUNTER
Patient's wife called in regarding MRI, states they received letter dated 5/4/24- from [facility] that shows they approved the MRI portion but not the 3D portion. Inquiring if we are aware of this and what the next steps are. Please advise, last note I can see is from April with regard to MRI authorization.

## 2024-05-29 ENCOUNTER — HOSPITAL ENCOUNTER (OUTPATIENT)
Dept: RADIOLOGY | Age: 89
Discharge: HOME | End: 2024-05-29
Payer: COMMERCIAL

## 2024-05-29 DIAGNOSIS — R97.20 ELEVATED PSA: ICD-10-CM

## 2024-05-29 PROCEDURE — A9585 GADOBUTROL INJECTION: HCPCS

## 2024-05-29 PROCEDURE — 76377 3D RENDER W/INTRP POSTPROCES: CPT

## 2024-05-29 PROCEDURE — 72197 MRI PELVIS W/O & W/DYE: CPT

## 2024-05-29 RX ORDER — GADOBUTROL 604.72 MG/ML
13 INJECTION INTRAVENOUS
Status: COMPLETED | OUTPATIENT
Start: 2024-05-29 | End: 2024-05-29

## 2024-05-29 RX ADMIN — GADOBUTROL 13 ML: 604.72 INJECTION INTRAVENOUS at 14:05

## 2024-06-25 ENCOUNTER — OFFICE VISIT (OUTPATIENT)
Dept: UROLOGY | Facility: CLINIC | Age: 89
End: 2024-06-25
Payer: COMMERCIAL

## 2024-06-25 VITALS
WEIGHT: 291 LBS | HEIGHT: 69 IN | DIASTOLIC BLOOD PRESSURE: 60 MMHG | OXYGEN SATURATION: 93 % | SYSTOLIC BLOOD PRESSURE: 100 MMHG | HEART RATE: 85 BPM | BODY MASS INDEX: 43.1 KG/M2

## 2024-06-25 DIAGNOSIS — E66.01 CLASS 3 SEVERE OBESITY DUE TO EXCESS CALORIES WITHOUT SERIOUS COMORBIDITY WITH BODY MASS INDEX (BMI) OF 40.0 TO 44.9 IN ADULT (HCC): ICD-10-CM

## 2024-06-25 DIAGNOSIS — N40.0 BENIGN PROSTATIC HYPERPLASIA WITHOUT LOWER URINARY TRACT SYMPTOMS: ICD-10-CM

## 2024-06-25 DIAGNOSIS — N18.31 STAGE 3A CHRONIC KIDNEY DISEASE (HCC): ICD-10-CM

## 2024-06-25 DIAGNOSIS — R97.20 ELEVATED PSA: Primary | ICD-10-CM

## 2024-06-25 PROCEDURE — 99214 OFFICE O/P EST MOD 30 MIN: CPT | Performed by: UROLOGY

## 2024-06-25 NOTE — PROGRESS NOTES
Hung Poe is a(n) 88 y.o. male. , :  1935    Subjective     Assessment:  The primary encounter diagnosis was Elevated PSA. A diagnosis of Benign prostatic hyperplasia without lower urinary tract symptoms was also pertinent to this visit.  88-year-old gentleman with BPH and lower urinary tract symptoms, elevated PSA.  MRI of the prostate suggests lesion in the left posterolateral region, peripherally.  Not eager to do biopsy.  Prior digital rectal exam showed no overt asymmetry or nodularity.    Plan:  Biopsy vs observation.  Prefers to watch.  PSA and DARREN and PSA in 6 months.  Call if urination gets worse or has bone pain to suggest that there is spread of the cancer.  I think this would be difficult for him to tell in and amongst his normal aches and pains that comes with being 88.     Radiology  2024 MRI prostate  1. PI-RADSv2.1 Category 4 - High (clinically significant cancer is likely to be present). A 1.4 cm left posterolateral peripheral zone lesion, mid gland to apex.  2. No extraprostatic tumor, seminal vesicle invasion, pelvic lymphadenopathy, or pelvic osseous metastatic disease.  3. Calculated prostate volume of 86 mL.     History  Elevated PSA with abnormal MRI left posterolateral peripheral.  Mid gland to apex.    Prior Visits  2021 [place]    85-year-old man history of BPH.  He has no significant urinary complaints.  Last year he was referred by his family doctor for elevated PSA at age 84. His random PSA was 5.2 with no previous ones to compare.  Repeat PSA was 4.2.  At that time I recommended 1 year follow-up with no further PSA testing. He has occasional 1-2 times nocturia.     2024 Ron  Patient is a 88-year-old gentleman with a history of BPH with no significant urinary tract symptoms and elevated PSA who presents to the office for follow-up.  Last seen in the office in 2021.  At this point in time his PSA was measuring 4.2 from a 5.2 previously ordered.   "Had a prostate exam with no nodularity noted.  He was advised to follow-up on an as-needed basis.  3 presents to the office today due to concern for low urinary tract symptoms such as frequency, urgency, urge incontinence.  No dysuria or gross hematuria.  He also obtained a updated PSA which returned significantly elevated at 9.3 with a total free PSA percentage of 12.2%.  No known family history of prostate cancer.  Patient has not had a biopsy before     6/25/2024 NAZANIN Alena  88-year-old gentleman with BPH and lower urinary tract symptoms, elevated PSA.  MRI of the prostate suggests lesion in the left posterolateral region, peripherally.    Plan:  Biopsy vs observation.  Prefers to watch.  PSA and DARREN and PSA in 6 months.  Call if urination gets worse or has bone pain to suggest that there is spread of the cancer.  I think this would be difficult for him to tell in and amongst his normal aches and pains that comes with being 88.    Review of Systems    Lab Results   Component Value Date    PSA 8.29 (H) 04/19/2024    PSA 9.3 (H) 04/12/2024    PSA 4.3 (H) 10/23/2020     No results found for: \"TESTOSTERONE\"  No components found for: \"CR\"  No results found for: \"HBA1C\"    Objective     /60 (BP Location: Left arm, Patient Position: Sitting, Cuff Size: Standard)   Pulse 85   Ht 5' 9\" (1.753 m)   Wt 132 kg (291 lb)   SpO2 93%   BMI 42.97 kg/m²     Physical Exam      [facility] [place]  "

## 2024-06-25 NOTE — PATIENT INSTRUCTIONS
Try to avoid carbohydrates.  On the outside chance that there is prostate cancer, like many other cancers they are happy or with sugar in the bloodstream in excess.  The use it is food.

## 2024-07-03 ENCOUNTER — TELEPHONE (OUTPATIENT)
Dept: NEUROLOGY | Facility: CLINIC | Age: 89
End: 2024-07-03

## 2024-07-03 NOTE — TELEPHONE ENCOUNTER
Call to pt to confirma appt with Dr Hernandez in [place] office 7/10 but no answer and no vpoice mail. Will send message thru portal.

## 2024-07-10 ENCOUNTER — OFFICE VISIT (OUTPATIENT)
Dept: NEUROLOGY | Facility: CLINIC | Age: 89
End: 2024-07-10
Payer: COMMERCIAL

## 2024-07-10 VITALS — HEART RATE: 78 BPM | DIASTOLIC BLOOD PRESSURE: 58 MMHG | SYSTOLIC BLOOD PRESSURE: 108 MMHG

## 2024-07-10 DIAGNOSIS — G70.00 MYASTHENIA GRAVIS (HCC): Primary | ICD-10-CM

## 2024-07-10 PROCEDURE — 99214 OFFICE O/P EST MOD 30 MIN: CPT | Performed by: PSYCHIATRY & NEUROLOGY

## 2024-07-10 NOTE — PROGRESS NOTES
"Please note: this note was created with voice recognition software. Occasional wrong word or \"sound alike\" substitutions may occur due to the inherent limitations of voice recognition software. Read the chart carefully and recognize (using context) where substitutions may have occurred. I am available to discuss any questions.       1. 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  Most cases do generalized but there is no evidence based on his history or based on his examination that this is the case.  He is satisfied with his response to Mestinon.  No further testing is required at this time.  He will continue the current dose of medication.  An increase of his dose could be considered if his symptoms worsen but he would need to watch for GI side effects.  Immunosuppressant and immunomodulatory treatment might also be considered but is not required at this time.  He understands that his symptoms can worsen in the heat as well as with infection and numerous medications.  He will return for follow-up in 4 months, or sooner if needed.      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  Most cases do generalized but there is no evidence based on his history or based on his examination that this is the case.  He is satisfied with his response to Mestinon.  No further testing is required at this time.  He will continue the current dose of medication.  An increase of his dose could be considered if his symptoms worsen but he would need to watch for GI side effects.  Immunosuppressant and immunomodulatory treatment might also be considered but is not required at this time.  He understands that his symptoms can worsen in the heat as well as with infection and numerous medications.  He will return for follow-up in 4 months, or sooner if needed.            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  Most cases do generalized but there is no evidence based on his history or based on his examination that this is the case.  He is satisfied with his response to Mestinon.  No further testing is required at this time.  He will continue the current dose of medication.  An increase of his dose could be considered if his symptoms worsen but he would need to watch for GI side effects.  Immunosuppressant and immunomodulatory treatment might also be considered but is not required at this time.  He understands that his symptoms can worsen in the heat as well as with infection and numerous medications.  He will return for follow-up in 4 months, or sooner if needed.         Prepatellar bursitis of right knee    Primary osteoarthritis of left knee    Right knee pain    S/P total knee arthroplasty, right    Stage 3a chronic kidney disease ([facility])         I had the pleasure of seeing Hung Poe, a 88 y.o. male, for neuromuscular follow-up.     Early this year \"my eye went closed\".  He and his wife state only the right was involved although the medical records suggest that there was left-sided involvement in the past.  With time his droopy eye became constant but fluctuated in intensity.  He has not noticed any particular time of the day that he is more likely to be affected.  His vision was \"blurry\" but with further questioning he refers to difficulty seeing through his droopy eyelid.  He had a tendency to hold his eyelid open with his fingers but without " "diplopia or blurring of images.  He and his wife have not noticed dyspnea, dysarthria, or dysphagia.  He has a history of bilateral knee replacement and pain in the feet, and as a result he no longer does the lawn work or walks as far.  He denies specific fatigue.  There is no activity that he feels that he cannot perform although he has a tendency to hold onto the handrail while he is walking.  Sometimes while he is walking his tongue gets \"stiff\".  CT angiogram of the head and neck were negative.  Laboratory testing showed elevated titers of acetylcholine receptor binding, blocking, and modulating antibodies.  CT of the chest showed no mediastinal pathology.  He is on pyridostigmine 60 mg 4 times daily.  Since we increased up to the current dose his ptosis is less severe and is no longer constant, but is more likely to occur while driving his car.     He has a history of hypertension and chronic kidney disease.  There is a remote history of tobacco use but no history of substance abuse.  He sees urology in follow-up because of concerns about prostate malignancy       Past Medical History:   Diagnosis Date    Chronic kidney disease     Hypertension     Sleep apnea     no CPAP        Past Surgical History:   Procedure Laterality Date    CARPAL TUNNEL RELEASE      COLONOSCOPY      WA ARTHROCENTESIS ASPIR&/INJ MAJOR JT/BURSA W/O US  6/14/2018    Procedure: ASPIRATION RIGHT KNEE BURSA;  Surgeon: Billy Villalobos MD;  Location: [facility];  Service: Orthopedics    WA ARTHRP KNE CONDYLE&PLATU MEDIAL&LAT COMPARTMENTS Left 6/14/2018    Procedure: ARTHROPLASTY KNEE TOTAL;  Surgeon: Billy Villalobos MD;  Location: [facility];  Service: Orthopedics    WA ARTHRP KNE CONDYLE&PLATU MEDIAL&LAT COMPARTMENTS Right 6/16/2021    Procedure: ARTHROPLASTY KNEE TOTAL;  Surgeon: Billy Villalobos MD;  Location: [facility];  Service: Orthopedics       Patient has no known allergies.    Social History     Tobacco Use   Smoking Status Former "   Smokeless Tobacco Never   Tobacco Comments    quit 50 years ago        Social History     Substance and Sexual Activity   Alcohol Use Not Currently       Social History     Substance and Sexual Activity   Drug Use No       Family History   Problem Relation Age of Onset    No Known Problems Father          Current Outpatient Medications:     lisinopril-hydrochlorothiazide (PRINZIDE,ZESTORETIC) 20-25 MG per tablet, Take 1 tablet by mouth daily, Disp: , Rfl:     LUMIGAN 0.01 % ophthalmic drops, Administer into the left eye daily at bedtime , Disp: , Rfl:     pyridostigmine (Mestinon) 60 mg tablet, Take 1 tablet (60 mg total) by mouth 4 (four) times a day, Disp: 120 tablet, Rfl: 3    aspirin 81 mg chewable tablet, Chew daily  (Patient not taking: Reported on 11/15/2023), Disp: , Rfl:     docusate sodium (COLACE) 100 mg capsule, Take 1 capsule (100 mg total) by mouth 2 (two) times a day (Patient not taking: Reported on 9/10/2021), Disp: 10 capsule, Rfl: 0    enoxaparin (LOVENOX) 40 mg/0.4 mL, Inject 0.4 mL (40 mg total) under the skin daily (Patient not taking: Reported on 9/10/2021), Disp: 28 mL, Rfl: 0    gabapentin (NEURONTIN) 100 mg capsule, Take 100 mg by mouth 2 (two) times a day (Patient not taking: Reported on 7/10/2024), Disp: , Rfl:     oxyCODONE (ROXICODONE) 5 mg immediate release tablet, 1 pill po Q6 Hrs prn severe pain (Patient not taking: Reported on 9/10/2021), Disp: 20 tablet, Rfl: 0    triamcinolone (KENALOG) 0.1 % ointment, Apply topically twice a day for up to 14 days to active areas as needed. May repeat course after taking 1 week break. Do not use on face, groin, armpits. Do not use on open skin (Patient not taking: Reported on 4/18/2024), Disp: 453.6 g, Rfl: 1    Appointment on 04/19/2024   Component Date Value Ref Range Status    PSA, Diagnostic 04/19/2024 8.29 (H)  0.00 - 4.00 ng/mL Final    [employer] Access chemiluminescent immunoassay. Confirm baseline values for patients being serially  monitored.    Sodium 04/19/2024 139  135 - 147 mmol/L Final    Potassium 04/19/2024 4.4  3.5 - 5.3 mmol/L Final    Chloride 04/19/2024 103  96 - 108 mmol/L Final    CO2 04/19/2024 28  21 - 32 mmol/L Final    ANION GAP 04/19/2024 8  4 - 13 mmol/L Final    BUN 04/19/2024 26 (H)  5 - 25 mg/dL Final    Creatinine 04/19/2024 1.33 (H)  0.60 - 1.30 mg/dL Final    Standardized to IDMS reference method    Glucose 04/19/2024 127  65 - 140 mg/dL Final    If the patient is fasting, the ADA then defines impaired fasting glucose as > 100 mg/dL and diabetes as > or equal to 123 mg/dL.    Calcium 04/19/2024 9.8  8.4 - 10.2 mg/dL Final    eGFR 04/19/2024 48  ml/min/1.73sq m Final   Appointment on 04/15/2024   Component Date Value Ref Range Status    Sodium 04/15/2024 139  135 - 147 mmol/L Final    Potassium 04/15/2024 4.4  3.5 - 5.3 mmol/L Final    Chloride 04/15/2024 102  96 - 108 mmol/L Final    CO2 04/15/2024 28  21 - 32 mmol/L Final    ANION GAP 04/15/2024 9  4 - 13 mmol/L Final    BUN 04/15/2024 30 (H)  5 - 25 mg/dL Final    Creatinine 04/15/2024 1.56 (H)  0.60 - 1.30 mg/dL Final    Standardized to IDMS reference method    Glucose, Fasting 04/15/2024 190 (H)  65 - 99 mg/dL Final    Calcium 04/15/2024 9.6  8.4 - 10.2 mg/dL Final    eGFR 04/15/2024 39  ml/min/1.73sq m Final   Appointment on 04/12/2024   Component Date Value Ref Range Status    Prostate Specific Antigen Total 04/12/2024 9.3 (H)  0.0 - 4.0 ng/mL Final    Roche ECLIA methodology.  According to the American Urological Association, Serum PSA should  decrease and remain at undetectable levels after radical  prostatectomy. The AUA defines biochemical recurrence as an initial  PSA value 0.2 ng/mL or greater followed by a subsequent confirmatory  PSA value 0.2 ng/mL or greater.  Values obtained with different assay methods or kits cannot be used  interchangeably. Results cannot be interpreted as absolute evidence  of the presence or absence of malignant disease.    PSA,  Free 04/12/2024 1.13  N/A ng/mL Final    Roche ECLIA methodology.    PSA, Free Pct 04/12/2024 12.2  % Final    The table below lists the probability of prostate cancer for  men with non-suspicious DARREN results and total PSA between  4 and 10 ng/mL, by patient age (Cristina et al, JONATAN 1998,  279:1542).                    % Free PSA       50-64 yr        65-75 yr                    0.00-10.00%        56%             55%                   10.01-15.00%        24%             35%                   15.01-20.00%        17%             23%                   20.01-25.00%        10%             20%                        >25.00%         5%              9%  Please note:  Cristina et al did not make specific                recommendations regarding the use of                percent free PSA for any other population                of men.        No results found for this or any previous visit.     No results found for this or any previous visit.    No results found for this or any previous visit.    No results found for this or any previous visit.    No results found for this or any previous visit.    No results found for this or any previous visit.    No results found for this or any previous visit.    No results found for this or any previous visit.    No results found for this or any previous visit.    No results found for this or any previous visit.    No results found for this or any previous visit.    No results found for this or any previous visit.      Review of Systems      On examination,     Blood pressure 108/58, pulse 78.    Extremities: no clubbing, cyanosis, or edema     Speech and cognition appeared normal     Cranial nerves:  II: Pupils equal, round, and reactive to light. No gross visual field defect. I did not appreciate optic disc edema.  III, IV, VI: Extraocular movements intact. Constant right ptosis that was worse after the motor exam. Cogan's lid twitch sign present. No diplopia, even after the motor  exam  V: Normal facial sensation in all three divisions of the trigeminal nerve bilaterally  VII: Normal facial strength  VIII: Hearing intact to finger rub bilaterally  IX, X: Palate elevated symmetrically  XI: Sternocleidomastoid strength normal bilaterally  XII: Tongue protruded in midline without atrophy or fibrillations     Motor:  Normal tone and bulk throughout.   Muscle strength testing by the MRC scale was 5/5 in the deltoid, biceps, triceps, wrist extensors, wrist flexors, finger extensors, finger flexors,. Hip flexors, quadriceps, ankle dorsiflexors, ankle plantar flexors, and EHL bilaterally  Normal neck flexors; no fatigueability     Deep tendon reflexes:   2+ and symmetrical in the biceps, triceps, brachioradialis, patellas, and ankles  Toes downgoing (no Babinski sign)  No Loera's sign     Sensation: Normal pinprick and light touch throughout     Cerebellar: normal finger to nose and heel to shin testing     Gait: Normal heel, toe, and tandem gait          There are no Patient Instructions on file for this visit.      Thank you very much for allowing me to participate in your patient's care. Please feel free to contact me for any questions or concerns. Please be aware of the inherent limitations of voice recognition software, which may result in transcriptional errors.    Tino Hernandez MD

## 2024-07-10 NOTE — ASSESSMENT & PLAN NOTE
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  Most cases do generalized but there is no evidence based on his history or based on his examination that this is the case.  He is satisfied with his response to Mestinon.  No further testing is required at this time.  He will continue the current dose of medication.  An increase of his dose could be considered if his symptoms worsen but he would need to watch for GI side effects.  Immunosuppressant and immunomodulatory treatment might also be considered but is not required at this time.  He understands that his symptoms can worsen in the heat as well as with infection and numerous medications.  He will return for follow-up in 4 months, or sooner if needed.

## 2024-11-13 ENCOUNTER — OFFICE VISIT (OUTPATIENT)
Dept: NEUROLOGY | Facility: CLINIC | Age: 89
End: 2024-11-13
Payer: COMMERCIAL

## 2024-11-13 VITALS
DIASTOLIC BLOOD PRESSURE: 64 MMHG | HEART RATE: 79 BPM | WEIGHT: 288 LBS | HEIGHT: 69 IN | BODY MASS INDEX: 42.65 KG/M2 | SYSTOLIC BLOOD PRESSURE: 114 MMHG | TEMPERATURE: 97.1 F

## 2024-11-13 DIAGNOSIS — G70.00 MYASTHENIA GRAVIS (HCC): ICD-10-CM

## 2024-11-13 PROCEDURE — 99213 OFFICE O/P EST LOW 20 MIN: CPT | Performed by: PSYCHIATRY & NEUROLOGY

## 2024-11-13 RX ORDER — PYRIDOSTIGMINE BROMIDE 60 MG/1
60 TABLET ORAL 4 TIMES DAILY
Qty: 360 TABLET | Refills: 1 | Status: SHIPPED | OUTPATIENT
Start: 2024-11-13 | End: 2025-03-13

## 2024-11-13 NOTE — PROGRESS NOTES
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  Ptosis has been his only symptom.  He has been very stable with the administration of pyridostigmine monotherapy.  Statistically speaking he is still likely to generalize at some point but there is no evidence that this is the case at this time.  Therefore, he will continue his current dose of treatment.  I have sent in another prescription for him.  He will return for follow-up in 6 months, or sooner if needed.  All of his questions were answered and he is in agreement with this plan.    Orders:    pyridostigmine (Mestinon) 60 mg tablet; Take 1 tablet (60 mg total) by mouth 4 (four) times a day        History of Present Illness   HPI  Hung Poe is a 88 y.o. male who presents for follow-up. Early this year \"my eye went closed\".  He and his wife state only the right was involved although the medical records suggest that there was left-sided involvement in the past.  His droopy eye became constant but fluctuated in intensity.  He has not noticed any particular time of the day that he is more likely to be affected.  His vision was \"blurry\" but with further questioning he refers to difficulty seeing through his droopy eyelid.  He had a tendency to hold his eyelid open with his fingers but without diplopia or blurring of images.  He and his wife deny dyspnea, dysarthria, or dysphagia.  He has a history of bilateral knee replacement and pain in the feet, and as a result he no longer does the lawn work or walks as far.  He denies specific fatigue.  There is no activity that he feels that he cannot perform although he has a tendency to hold onto the handrail while he is walking.  Sometimes while he is walking his tongue gets " "\"stiff\".  CT angiogram of the head and neck were negative.  Laboratory testing showed elevated titers of acetylcholine receptor binding, blocking, and modulating antibodies.  CT of the chest showed no mediastinal pathology.  He is on pyridostigmine 60 mg 4 times daily.  Since we increased up to the current dose his ptosis is less severe.  He feels normal and is very pleased with his clinical course.     He has a history of hypertension and chronic kidney disease.  There is a remote history of tobacco use but no history of substance abuse.  He sees urology in follow-up because of concerns about prostate malignancy     Review of Systems   Constitutional:  Negative for appetite change, fatigue and fever.   HENT: Negative.  Negative for hearing loss, tinnitus, trouble swallowing and voice change.    Eyes: Negative.  Negative for photophobia, pain and visual disturbance.   Respiratory: Negative.  Negative for shortness of breath.    Cardiovascular: Negative.  Negative for palpitations.   Gastrointestinal: Negative.  Negative for nausea and vomiting.   Endocrine: Negative.  Negative for cold intolerance.   Genitourinary: Negative.  Negative for dysuria, frequency and urgency.   Musculoskeletal:  Negative for back pain, gait problem, myalgias, neck pain and neck stiffness.   Skin: Negative.  Negative for rash.   Allergic/Immunologic: Negative.    Neurological: Negative.  Negative for dizziness, tremors, seizures, syncope, facial asymmetry, speech difficulty, weakness, light-headedness, numbness and headaches.   Hematological: Negative.  Does not bruise/bleed easily.   Psychiatric/Behavioral: Negative.  Negative for confusion, hallucinations and sleep disturbance.    All other systems reviewed and are negative.  No new issues to address  I have personally reviewed the MA's review of systems and made changes as necessary.    Pertinent Medical History       Medical History Reviewed by provider this encounter:     .  Past " Medical History   Past Medical History:   Diagnosis Date    Chronic kidney disease     Hypertension     Sleep apnea     no CPAP      Past Surgical History:   Procedure Laterality Date    CARPAL TUNNEL RELEASE      COLONOSCOPY      [place] ARTHROCENTESIS ASPIR&/INJ MAJOR JT/BURSA W/O US  6/14/2018    Procedure: ASPIRATION RIGHT KNEE BURSA;  Surgeon: Billy Villalobos MD;  [facility];  Service: Orthopedics    GA ARTHRP KNE CONDYLE&PLATU MEDIAL&LAT COMPARTMENTS Left 6/14/2018    Procedure: ARTHROPLASTY KNEE TOTAL;  Surgeon: Billy Villalobos MD;  [facility];  Service: Orthopedics    GA ARTHRP KNE CONDYLE&PLATU MEDIAL&LAT COMPARTMENTS Right 6/16/2021    Procedure: ARTHROPLASTY KNEE TOTAL;  Surgeon: Billy Villalobos MD;  [facility];  Service: Orthopedics     Family History   Problem Relation Age of Onset    No Known Problems Father       reports that he has quit smoking. He has never used smokeless tobacco. He reports that he does not currently use alcohol. He reports that he does not use drugs.  Current Outpatient Medications on File Prior to Visit   Medication Sig Dispense Refill    lisinopril-hydrochlorothiazide (PRINZIDE,ZESTORETIC) 20-25 MG per tablet Take 1 tablet by mouth daily      LUMIGAN 0.01 % ophthalmic drops Administer into the left eye daily at bedtime       [DISCONTINUED] pyridostigmine (Mestinon) 60 mg tablet Take 1 tablet (60 mg total) by mouth 4 (four) times a day 120 tablet 3    aspirin 81 mg chewable tablet Chew daily  (Patient not taking: Reported on 11/15/2023)      docusate sodium (COLACE) 100 mg capsule Take 1 capsule (100 mg total) by mouth 2 (two) times a day (Patient not taking: Reported on 9/10/2021) 10 capsule 0    enoxaparin (LOVENOX) 40 mg/0.4 mL Inject 0.4 mL (40 mg total) under the skin daily (Patient not taking: Reported on 9/10/2021) 28 mL 0    gabapentin (NEURONTIN) 100 mg capsule Take 100 mg by mouth 2 (two) times a day (Patient not taking: Reported on 7/10/2024)       oxyCODONE (ROXICODONE) 5 mg immediate release tablet 1 pill po Q6 Hrs prn severe pain (Patient not taking: Reported on 9/10/2021) 20 tablet 0    triamcinolone (KENALOG) 0.1 % ointment Apply topically twice a day for up to 14 days to active areas as needed. May repeat course after taking 1 week break. Do not use on face, groin, armpits. Do not use on open skin (Patient not taking: Reported on 4/18/2024) 453.6 g 1     No current facility-administered medications on file prior to visit.   No Known Allergies   Current Outpatient Medications on File Prior to Visit   Medication Sig Dispense Refill    lisinopril-hydrochlorothiazide (PRINZIDE,ZESTORETIC) 20-25 MG per tablet Take 1 tablet by mouth daily      LUMIGAN 0.01 % ophthalmic drops Administer into the left eye daily at bedtime       [DISCONTINUED] pyridostigmine (Mestinon) 60 mg tablet Take 1 tablet (60 mg total) by mouth 4 (four) times a day 120 tablet 3    aspirin 81 mg chewable tablet Chew daily  (Patient not taking: Reported on 11/15/2023)      docusate sodium (COLACE) 100 mg capsule Take 1 capsule (100 mg total) by mouth 2 (two) times a day (Patient not taking: Reported on 9/10/2021) 10 capsule 0    enoxaparin (LOVENOX) 40 mg/0.4 mL Inject 0.4 mL (40 mg total) under the skin daily (Patient not taking: Reported on 9/10/2021) 28 mL 0    gabapentin (NEURONTIN) 100 mg capsule Take 100 mg by mouth 2 (two) times a day (Patient not taking: Reported on 7/10/2024)      oxyCODONE (ROXICODONE) 5 mg immediate release tablet 1 pill po Q6 Hrs prn severe pain (Patient not taking: Reported on 9/10/2021) 20 tablet 0    triamcinolone (KENALOG) 0.1 % ointment Apply topically twice a day for up to 14 days to active areas as needed. May repeat course after taking 1 week break. Do not use on face, groin, armpits. Do not use on open skin (Patient not taking: Reported on 4/18/2024) 453.6 g 1     No current facility-administered medications on file prior to visit.      Social History  "    Tobacco Use    Smoking status: Former    Smokeless tobacco: Never    Tobacco comments:     quit 50 years ago    Vaping Use    Vaping status: Never Used   Substance and Sexual Activity    Alcohol use: Not Currently    Drug use: No    Sexual activity: Not on file        Objective   /64   Pulse 79   Temp (!) 97.1 °F (36.2 °C)   Ht 5' 9\" (1.753 m)   Wt 131 kg (288 lb)   BMI 42.53 kg/m²     Physical Exam  Neurologic Exam    Cranial nerves:  II: Pupils equal, round, and reactive to light. No gross visual field defect. III, IV, VI: Extraocular movements intact. Constant right ptosis that was worse after the motor exam. No diplopia, even after the motor exam  V: Normal facial sensation in all three divisions of the trigeminal nerve bilaterally  VII: Normal facial strength  VIII: Hearing intact to finger rub bilaterally  IX, X: Palate elevated symmetrically  XI: Sternocleidomastoid strength normal bilaterally  XII: Tongue protruded in midline without atrophy or fibrillations     Motor:  Normal tone and bulk throughout.   Muscle strength testing by the MRC scale was 5/5 in the deltoid, biceps, triceps, wrist extensors, wrist flexors, finger extensors, finger flexors,. Hip flexors, quadriceps, ankle dorsiflexors, ankle plantar flexors, and EHL bilaterally  Normal neck flexors; no fatigueability        Sensation: Normal pinprick and light touch throughout     Cerebellar: normal finger to nose and heel to shin testing     Gait: Normal heel, toe, and tandem gait  "

## 2024-11-13 NOTE — ASSESSMENT & PLAN NOTE
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  Ptosis has been his only symptom.  He has been very stable with the administration of pyridostigmine monotherapy.  Statistically speaking he is still likely to generalize at some point but there is no evidence that this is the case at this time.  Therefore, he will continue his current dose of treatment.  I have sent in another prescription for him.  He will return for follow-up in 6 months, or sooner if needed.  All of his questions were answered and he is in agreement with this plan.    Orders:    pyridostigmine (Mestinon) 60 mg tablet; Take 1 tablet (60 mg total) by mouth 4 (four) times a day

## 2024-11-14 ENCOUNTER — TELEPHONE (OUTPATIENT)
Age: 89
End: 2024-11-14

## 2024-11-14 NOTE — TELEPHONE ENCOUNTER
Pt's wife, Heidi called. Dr. Hernandez sent a script for pyridostigmine 60 mg to [facility] at the office visit yesterday. Refills that were on file at [facility] were canceled. Heidi thought she would be able to get the medication from [facility], but was unable to. Pt needs a month supply of pyridostigmine 60 mg sent to [facility], to hold him over until the med can be obtained from [facility]. Pt has no medication at this time.    To avoid a delay in pt care, called [facility]. Provided a verbal order to Armaan, the pharmacist for pyridostigmine 60 mg, take 1 tab by mouth 4 times a day. #120, zero refills.    Called Heidi back and made her aware that a verbal order was provided. She was grateful for the assistance.

## 2024-11-15 ENCOUNTER — OFFICE VISIT (OUTPATIENT)
Dept: OBGYN CLINIC | Facility: MEDICAL CENTER | Age: 89
End: 2024-11-15
Payer: COMMERCIAL

## 2024-11-15 ENCOUNTER — APPOINTMENT (OUTPATIENT)
Dept: RADIOLOGY | Facility: MEDICAL CENTER | Age: 89
End: 2024-11-15
Payer: COMMERCIAL

## 2024-11-15 VITALS
WEIGHT: 288.6 LBS | HEART RATE: 71 BPM | DIASTOLIC BLOOD PRESSURE: 72 MMHG | SYSTOLIC BLOOD PRESSURE: 122 MMHG | HEIGHT: 69 IN | BODY MASS INDEX: 42.75 KG/M2

## 2024-11-15 DIAGNOSIS — M25.552 LATERAL PAIN OF LEFT HIP: ICD-10-CM

## 2024-11-15 DIAGNOSIS — M25.552 PAIN IN LEFT HIP: ICD-10-CM

## 2024-11-15 DIAGNOSIS — M16.12 PRIMARY OSTEOARTHRITIS OF LEFT HIP: ICD-10-CM

## 2024-11-15 DIAGNOSIS — M70.62 TROCHANTERIC BURSITIS OF LEFT HIP: Primary | ICD-10-CM

## 2024-11-15 PROCEDURE — 99213 OFFICE O/P EST LOW 20 MIN: CPT | Performed by: ORTHOPAEDIC SURGERY

## 2024-11-15 PROCEDURE — 73502 X-RAY EXAM HIP UNI 2-3 VIEWS: CPT

## 2024-11-15 NOTE — PROGRESS NOTES
"Assessment:   Diagnosis ICD-10-CM Associated Orders   1. Trochanteric bursitis of left hip  M70.62       2. Lateral pain of left hip  M25.552 XR hip/pelv 2-3 vws left if performed      3. Primary osteoarthritis of left hip  M16.12           Plan:  Diagnostics reviewed and physical exam performed.  Diagnosis, treatment options and associated risks were discussed with the patient including no treatment, nonsurgical treatment and potential for surgical intervention.  The patient was given the opportunity to ask questions regarding each.  Both hips have moderate OA however his left hip symptoms are more associated with trochanteric bursitis/IT band tendonitis.  Weight bearing and activities as tolerated  Counseled on weight loss and general wellness    To do next visit:  Return if symptoms worsen or fail to improve, for re-check on an as needed basis (PRN).    The above stated was discussed in layman's terms and the patient expressed understanding.  All questions were answered to the patient's satisfaction.       Scribe Attestation      I,:  Rayo Waldron am acting as a scribe while in the presence of the attending physician.:       I,:  Billy Villalobos MD personally performed the services described in this documentation    as scribed in my presence.:               Subjective:   Hung Poe is a 88 y.o. male who presents today for initial evaluation of his left hip due to pain.  His pain is laterally.  He had an episode a few weeks ago where his hip \"gave out \".  He denies any groin or medial thigh pain.  Denies any back or buttock pain.  His lateral hip symptoms improved with time, rest as well as use of a cane for ambulatory assistance.  He does have chronic peripheral neuropathy.  Since calling and making the appointment his symptoms resolved.    History of both knee replacements 2019/2021.      Review of systems negative unless otherwise specified in HPI  Review of Systems    Past Medical History:   Diagnosis " Date    Chronic kidney disease     Hypertension     Sleep apnea     no CPAP        Past Surgical History:   Procedure Laterality Date    CARPAL TUNNEL RELEASE      COLONOSCOPY      [place] ARTHROCENTESIS ASPIR&/INJ MAJOR JT/BURSA W/O US  6/14/2018    Procedure: ASPIRATION RIGHT KNEE BURSA;  Surgeon: Billy Villalobos MD;  [facility];  Service: Orthopedics    MO ARTHRP KNE CONDYLE&PLATU MEDIAL&LAT COMPARTMENTS Left 6/14/2018    Procedure: ARTHROPLASTY KNEE TOTAL;  Surgeon: Billy Villalobos MD;  [facility];  Service: Orthopedics    MO ARTHRP KNE CONDYLE&PLATU MEDIAL&LAT COMPARTMENTS Right 6/16/2021    Procedure: ARTHROPLASTY KNEE TOTAL;  Surgeon: Billy Villalobos MD;  [facility];  Service: Orthopedics       Family History   Problem Relation Age of Onset    No Known Problems Father        Social History     Occupational History    Not on file   Tobacco Use    Smoking status: Former    Smokeless tobacco: Never    Tobacco comments:     quit 50 years ago    Vaping Use    Vaping status: Never Used   Substance and Sexual Activity    Alcohol use: Not Currently    Drug use: No    Sexual activity: Not on file         Current Outpatient Medications:     lisinopril-hydrochlorothiazide (PRINZIDE,ZESTORETIC) 20-25 MG per tablet, Take 1 tablet by mouth daily, Disp: , Rfl:     LUMIGAN 0.01 % ophthalmic drops, Administer into the left eye daily at bedtime , Disp: , Rfl:     pyridostigmine (Mestinon) 60 mg tablet, Take 1 tablet (60 mg total) by mouth 4 (four) times a day, Disp: 360 tablet, Rfl: 1    aspirin 81 mg chewable tablet, Chew daily  (Patient not taking: Reported on 11/15/2023), Disp: , Rfl:     docusate sodium (COLACE) 100 mg capsule, Take 1 capsule (100 mg total) by mouth 2 (two) times a day (Patient not taking: Reported on 9/10/2021), Disp: 10 capsule, Rfl: 0    enoxaparin (LOVENOX) 40 mg/0.4 mL, Inject 0.4 mL (40 mg total) under the skin daily (Patient not taking: Reported on 9/10/2021), Disp: 28 mL,  Rfl: 0    gabapentin (NEURONTIN) 100 mg capsule, Take 100 mg by mouth 2 (two) times a day (Patient not taking: Reported on 7/10/2024), Disp: , Rfl:     oxyCODONE (ROXICODONE) 5 mg immediate release tablet, 1 pill po Q6 Hrs prn severe pain (Patient not taking: Reported on 9/10/2021), Disp: 20 tablet, Rfl: 0    triamcinolone (KENALOG) 0.1 % ointment, Apply topically twice a day for up to 14 days to active areas as needed. May repeat course after taking 1 week break. Do not use on face, groin, armpits. Do not use on open skin (Patient not taking: Reported on 4/18/2024), Disp: 453.6 g, Rfl: 1    No Known Allergies         Vitals:    11/15/24 0907   BP: 122/72   Pulse: 71       Body mass index is 42.62 kg/m².  Wt Readings from Last 3 Encounters:   11/15/24 131 kg (288 lb 9.6 oz)   11/13/24 131 kg (288 lb)   06/25/24 132 kg (291 lb)       Objective:                    Right Hip Exam     Tenderness   The patient is experiencing no tenderness.     Range of Motion   Flexion:  80   External rotation:  40   Internal rotation:  20     Muscle Strength   The patient has normal right hip strength.    Other   Erythema: absent      Left Hip Exam     Tenderness   The patient is experiencing tenderness in the greater trochanter and lateral.    Range of Motion   Flexion:  80   External rotation:  30   Internal rotation: 10 (Without groin pain)     Muscle Strength   The patient has normal left hip strength.     Other   Erythema: absent    Comments:    Equal leg lengths with well-healed anterior incisions at both knees.            Diagnostics, reviewed and taken today if performed as documented:    The attending physician has personally reviewed the pertinent films in PACS and interpretation is as follows:    Left hip and pelvis x-rays taken and reviewed in the office today and show: Moderate degenerative changes of both hips with mild flattening of both superior femoral heads.  Subchondral sclerosis and osteophyte formation present at  "both femoral acetabular joints      Procedures, if performed today:    Procedures    None performed      Portions of the record may have been created with voice recognition software.  Occasional wrong word or \"sound a like\" substitutions may have occurred due to the inherent limitations of voice recognition software.  Read the chart carefully and recognize, using context, where substitutions have occurred.  "

## 2024-11-25 ENCOUNTER — APPOINTMENT (OUTPATIENT)
Dept: LAB | Facility: MEDICAL CENTER | Age: 89
End: 2024-11-25
Payer: COMMERCIAL

## 2024-11-25 DIAGNOSIS — I10 ESSENTIAL HYPERTENSION: ICD-10-CM

## 2024-11-25 DIAGNOSIS — R97.20 ELEVATED PSA: ICD-10-CM

## 2024-11-25 DIAGNOSIS — N18.31 STAGE 3A CHRONIC KIDNEY DISEASE (HCC): ICD-10-CM

## 2024-11-25 LAB
ALBUMIN SERPL BCG-MCNC: 4.2 G/DL (ref 3.5–5)
ANION GAP SERPL CALCULATED.3IONS-SCNC: 13 MMOL/L (ref 4–13)
BASOPHILS # BLD AUTO: 0.06 THOUSANDS/ΜL (ref 0–0.1)
BASOPHILS NFR BLD AUTO: 1 % (ref 0–1)
BUN SERPL-MCNC: 23 MG/DL (ref 5–25)
CALCIUM SERPL-MCNC: 10 MG/DL (ref 8.4–10.2)
CHLORIDE SERPL-SCNC: 103 MMOL/L (ref 96–108)
CO2 SERPL-SCNC: 25 MMOL/L (ref 21–32)
CREAT SERPL-MCNC: 1.38 MG/DL (ref 0.6–1.3)
CREAT UR-MCNC: 92.9 MG/DL
EOSINOPHIL # BLD AUTO: 0.13 THOUSAND/ΜL (ref 0–0.61)
EOSINOPHIL NFR BLD AUTO: 2 % (ref 0–6)
ERYTHROCYTE [DISTWIDTH] IN BLOOD BY AUTOMATED COUNT: 12.8 % (ref 11.6–15.1)
GFR SERPL CREATININE-BSD FRML MDRD: 45 ML/MIN/1.73SQ M
GLUCOSE SERPL-MCNC: 94 MG/DL (ref 65–140)
HCT VFR BLD AUTO: 45.5 % (ref 36.5–49.3)
HGB BLD-MCNC: 14.9 G/DL (ref 12–17)
IMM GRANULOCYTES # BLD AUTO: 0.01 THOUSAND/UL (ref 0–0.2)
IMM GRANULOCYTES NFR BLD AUTO: 0 % (ref 0–2)
LYMPHOCYTES # BLD AUTO: 1.23 THOUSANDS/ΜL (ref 0.6–4.47)
LYMPHOCYTES NFR BLD AUTO: 20 % (ref 14–44)
MCH RBC QN AUTO: 33 PG (ref 26.8–34.3)
MCHC RBC AUTO-ENTMCNC: 32.7 G/DL (ref 31.4–37.4)
MCV RBC AUTO: 101 FL (ref 82–98)
MONOCYTES # BLD AUTO: 0.58 THOUSAND/ΜL (ref 0.17–1.22)
MONOCYTES NFR BLD AUTO: 10 % (ref 4–12)
NEUTROPHILS # BLD AUTO: 4.04 THOUSANDS/ΜL (ref 1.85–7.62)
NEUTS SEG NFR BLD AUTO: 67 % (ref 43–75)
NRBC BLD AUTO-RTO: 0 /100 WBCS
PHOSPHATE SERPL-MCNC: 2.7 MG/DL (ref 2.3–4.1)
PLATELET # BLD AUTO: 199 THOUSANDS/UL (ref 149–390)
PMV BLD AUTO: 10.4 FL (ref 8.9–12.7)
POTASSIUM SERPL-SCNC: 4.7 MMOL/L (ref 3.5–5.3)
PROT UR-MCNC: 6.3 MG/DL
PROT/CREAT UR: 0.1 MG/G{CREAT} (ref 0–0.1)
PSA SERPL-MCNC: 12.46 NG/ML (ref 0–4)
PTH-INTACT SERPL-MCNC: 82 PG/ML (ref 12–88)
RBC # BLD AUTO: 4.52 MILLION/UL (ref 3.88–5.62)
SODIUM SERPL-SCNC: 141 MMOL/L (ref 135–147)
WBC # BLD AUTO: 6.05 THOUSAND/UL (ref 4.31–10.16)

## 2024-11-25 PROCEDURE — 36415 COLL VENOUS BLD VENIPUNCTURE: CPT

## 2024-11-25 PROCEDURE — 85025 COMPLETE CBC W/AUTO DIFF WBC: CPT

## 2024-11-25 PROCEDURE — 84153 ASSAY OF PSA TOTAL: CPT

## 2024-11-25 PROCEDURE — 82570 ASSAY OF URINE CREATININE: CPT

## 2024-11-25 PROCEDURE — 83970 ASSAY OF PARATHORMONE: CPT

## 2024-11-25 PROCEDURE — 84156 ASSAY OF PROTEIN URINE: CPT

## 2024-11-25 PROCEDURE — 80069 RENAL FUNCTION PANEL: CPT

## 2024-12-02 ENCOUNTER — RESULTS FOLLOW-UP (OUTPATIENT)
Dept: UROLOGY | Facility: CLINIC | Age: 89
End: 2024-12-02

## 2024-12-03 NOTE — TELEPHONE ENCOUNTER
Patient's wife called back with patient on the line, they are concerned that their follow up is not until January an inquired if they should discuss this sooner. Explained there are no sooner appointments with Dr. Kwan. They are open to seeing PA-C for sooner discussion. Appointment made for 12/23/24. Patient on wait list as well.

## 2024-12-05 ENCOUNTER — OFFICE VISIT (OUTPATIENT)
Dept: NEPHROLOGY | Facility: CLINIC | Age: 89
End: 2024-12-05
Payer: COMMERCIAL

## 2024-12-05 VITALS
SYSTOLIC BLOOD PRESSURE: 121 MMHG | OXYGEN SATURATION: 92 % | HEART RATE: 75 BPM | DIASTOLIC BLOOD PRESSURE: 79 MMHG | BODY MASS INDEX: 42.65 KG/M2 | WEIGHT: 288 LBS | HEIGHT: 69 IN

## 2024-12-05 DIAGNOSIS — E66.01 CLASS 3 SEVERE OBESITY DUE TO EXCESS CALORIES WITHOUT SERIOUS COMORBIDITY WITH BODY MASS INDEX (BMI) OF 40.0 TO 44.9 IN ADULT (HCC): ICD-10-CM

## 2024-12-05 DIAGNOSIS — G70.00 MYASTHENIA GRAVIS (HCC): ICD-10-CM

## 2024-12-05 DIAGNOSIS — R97.20 ELEVATED PSA: ICD-10-CM

## 2024-12-05 DIAGNOSIS — I12.9 BENIGN HYPERTENSION WITH CKD (CHRONIC KIDNEY DISEASE) STAGE III (HCC): Primary | ICD-10-CM

## 2024-12-05 DIAGNOSIS — E66.813 CLASS 3 SEVERE OBESITY DUE TO EXCESS CALORIES WITHOUT SERIOUS COMORBIDITY WITH BODY MASS INDEX (BMI) OF 40.0 TO 44.9 IN ADULT (HCC): ICD-10-CM

## 2024-12-05 DIAGNOSIS — N18.30 BENIGN HYPERTENSION WITH CKD (CHRONIC KIDNEY DISEASE) STAGE III (HCC): Primary | ICD-10-CM

## 2024-12-05 DIAGNOSIS — I10 ESSENTIAL HYPERTENSION: ICD-10-CM

## 2024-12-05 DIAGNOSIS — N18.31 STAGE 3A CHRONIC KIDNEY DISEASE (HCC): ICD-10-CM

## 2024-12-05 PROCEDURE — 99214 OFFICE O/P EST MOD 30 MIN: CPT | Performed by: INTERNAL MEDICINE

## 2024-12-05 PROCEDURE — G2211 COMPLEX E/M VISIT ADD ON: HCPCS | Performed by: INTERNAL MEDICINE

## 2024-12-05 NOTE — ASSESSMENT & PLAN NOTE
Blood pressure improved after recheck.  Patient is on combination lisinopril with HCTZ.  Advised to keep working on losing weight, follow low-salt diet

## 2024-12-05 NOTE — ASSESSMENT & PLAN NOTE
Lab Results   Component Value Date    EGFR 45 11/25/2024    EGFR 48 04/19/2024    EGFR 39 04/15/2024    CREATININE 1.38 (H) 11/25/2024    CREATININE 1.33 (H) 04/19/2024    CREATININE 1.56 (H) 04/15/2024   Stable.  Follow-up in 1 year

## 2024-12-05 NOTE — PROGRESS NOTES
OFFICE FOLLOW UP - Nephrology   Hung Poe 88 y.o. male MRN: [id number]       ASSESSMENT and PLAN:  Assessment & Plan  Benign hypertension with CKD (chronic kidney disease) stage III ([facility])  Lab Results   Component Value Date    EGFR 45 11/25/2024    EGFR 48 04/19/2024    EGFR 39 04/15/2024    CREATININE 1.38 (H) 11/25/2024    CREATININE 1.33 (H) 04/19/2024    CREATININE 1.56 (H) 04/15/2024   CKD stage III with creatinine fluctuating around 1.3-1.6.  CKD suspected secondary to hypertension, morbid obesity, age-related nephron loss  Renal function overall stable, blood pressure well-controlled after recheck.  Once again discussed about importance to keep working on losing weight, follow low-salt diet and avoid NSAIDs.  Plan to see him back in the office in 1 year with repeat labs  Stage 3a chronic kidney disease ([facility])  Lab Results   Component Value Date    EGFR 45 11/25/2024    EGFR 48 04/19/2024    EGFR 39 04/15/2024    CREATININE 1.38 (H) 11/25/2024    CREATININE 1.33 (H) 04/19/2024    CREATININE 1.56 (H) 04/15/2024   Stable.  Follow-up in 1 year  Essential hypertension  Blood pressure improved after recheck.  Patient is on combination lisinopril with HCTZ.  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  On pyridostigmine as per neurology          Patient Instructions   As we discussed in the office visit and after reviewing your most recent blood test your kidney function remains fairly stable with a most recent creatinine around 1.38.  Once again discussed about importance to follow low-sodium  diet.  Keep working on losing weight.  Avoid NSAIDs (no ibuprofen, Motrin, Advil, Aleve, naproxen).  Okay to take Tylenol or acetaminophen as needed for pain.  Follow-up with your primary care doctor, urologist, neurologist.  I would like to see you back in the office in 1 year with repeat labs.          HPI: Hung Poe is a 88 y.o. male who is here for Follow-up and Chronic Kidney Disease  .    Patient with history of CKD creatinine fluctuating around 1.3-1.6, hypertension, morbid obesity, osteoarthrosis status post right knee arthroplasty on 6/2021, last time seeing on 11/2023, today returns to the office for year follow-up with his wife    Since our last visit, was evaluated in the emergency department on 11/2023 due to ptosis, found to have myasthenia gravis, started on pyridostigmine, currently follows with neurology.   Patient with elevated PSA, follows with urology    Patient currently has no complaints at this time and is feeling well than some hip pain for what he follows with orthopedics.   Patient denies any chest pain, shortness of breath and swelling.   Denies any abdominal pain, no nausea, vomiting, no diarrhea or constipation.  Denies any urinary problems, no burning sensation or gross hematuria.  Denies NSAID use.  Noted he lost 7 pounds since last office visit 1 year ago    The last blood work was done on 11/25/2024, which we have reviewed together.  Recent creatinine 1.38 with an estimated GFR of 45, UPC 0.1    ROS:   All the systems were reviewed and were negative except as documented on the HPI.    Allergies: Patient has no known allergies.    Medications:   Current Outpatient Medications:     lisinopril-hydrochlorothiazide (PRINZIDE,ZESTORETIC) 20-25 MG per tablet, Take 1 tablet by mouth daily, Disp: , Rfl:     LUMIGAN 0.01 % ophthalmic drops, Administer into the left eye daily at bedtime , Disp: , Rfl:     pyridostigmine (Mestinon) 60 mg tablet, Take 1 tablet (60 mg total) by mouth 4 (four)  times a day, Disp: 360 tablet, Rfl: 1    aspirin 81 mg chewable tablet, Chew daily  (Patient not taking: Reported on 11/15/2023), Disp: , Rfl:     docusate sodium (COLACE) 100 mg capsule, Take 1 capsule (100 mg total) by mouth 2 (two) times a day (Patient not taking: Reported on 9/10/2021), Disp: 10 capsule, Rfl: 0    enoxaparin (LOVENOX) 40 mg/0.4 mL, Inject 0.4 mL (40 mg total) under the skin daily (Patient not taking: Reported on 9/10/2021), Disp: 28 mL, Rfl: 0    gabapentin (NEURONTIN) 100 mg capsule, Take 100 mg by mouth 2 (two) times a day (Patient not taking: Reported on 7/10/2024), Disp: , Rfl:     oxyCODONE (ROXICODONE) 5 mg immediate release tablet, 1 pill po Q6 Hrs prn severe pain (Patient not taking: Reported on 9/10/2021), Disp: 20 tablet, Rfl: 0    triamcinolone (KENALOG) 0.1 % ointment, Apply topically twice a day for up to 14 days to active areas as needed. May repeat course after taking 1 week break. Do not use on face, groin, armpits. Do not use on open skin (Patient not taking: Reported on 4/18/2024), Disp: 453.6 g, Rfl: 1    Past Medical History:   Diagnosis Date    Chronic kidney disease     Hypertension     Sleep apnea     no CPAP      Past Surgical History:   Procedure Laterality Date    CARPAL TUNNEL RELEASE      COLONOSCOPY      AR ARTHROCENTESIS ASPIR&/INJ MAJOR JT/BURSA W/O US  6/14/2018    Procedure: ASPIRATION RIGHT KNEE BURSA;  Surgeon: Billy Villalobos MD;  [facility];  Service: Orthopedics    AR ARTHRP KNE CONDYLE&PLATU MEDIAL&LAT COMPARTMENTS Left 6/14/2018    Procedure: ARTHROPLASTY KNEE TOTAL;  Surgeon: Billy Villalobos MD;  [facility];  Service: Orthopedics    AR ARTHRP KNE CONDYLE&PLATU MEDIAL&LAT COMPARTMENTS Right 6/16/2021    Procedure: ARTHROPLASTY KNEE TOTAL;  Surgeon: Billy Villalobos MD;  [facility];  Service: Orthopedics     Family History   Problem Relation Age of Onset    No Known Problems Father       reports that he has quit smoking. He has  "never used smokeless tobacco. He reports that he does not currently use alcohol. He reports that he does not use drugs.      Physical Exam:   Vitals:    12/05/24 1119 12/05/24 1142   BP: 140/78 121/79   BP Location: Left arm Left arm   Patient Position: Sitting Sitting   Cuff Size: Adult Large   Pulse: 75    SpO2: 92%    Weight: 131 kg (288 lb)    Height: 5' 9\" (1.753 m)      Body mass index is 42.53 kg/m².    General: Morbidly obese, cooperative, in not acute distress  Eyes: conjunctivae pink, anicteric sclerae  ENT: lips and mucous membranes moist  Neck: supple, no JVD  Chest: clear breath sounds bilateral, no crackles, ronchus or wheezings  CVS: distinct S1 & S2, normal rate, regular rhythm  Abdomen: soft, non-tender, non-distended, normoactive bowel sounds  Back: no CVA tenderness  Extremities: no edema of both legs  Skin: no rash, chronic skin changes in lower extremities  Neuro: awake, alert, oriented      Lab Results:  Results for orders placed or performed in visit on 11/25/24   CBC and differential    Collection Time: 11/25/24  9:37 AM   Result Value Ref Range    WBC 6.05 4.31 - 10.16 Thousand/uL    RBC 4.52 3.88 - 5.62 Million/uL    Hemoglobin 14.9 12.0 - 17.0 g/dL    Hematocrit 45.5 36.5 - 49.3 %     (H) 82 - 98 fL    MCH 33.0 26.8 - 34.3 pg    MCHC 32.7 31.4 - 37.4 g/dL    RDW 12.8 11.6 - 15.1 %    MPV 10.4 8.9 - 12.7 fL    Platelets 199 149 - 390 Thousands/uL    nRBC 0 /100 WBCs    Segmented % 67 43 - 75 %    Immature Grans % 0 0 - 2 %    Lymphocytes % 20 14 - 44 %    Monocytes % 10 4 - 12 %    Eosinophils Relative 2 0 - 6 %    Basophils Relative 1 0 - 1 %    Absolute Neutrophils 4.04 1.85 - 7.62 Thousands/µL    Absolute Immature Grans 0.01 0.00 - 0.20 Thousand/uL    Absolute Lymphocytes 1.23 0.60 - 4.47 Thousands/µL    Absolute Monocytes 0.58 0.17 - 1.22 Thousand/µL    Eosinophils Absolute 0.13 0.00 - 0.61 Thousand/µL    Basophils Absolute 0.06 0.00 - 0.10 Thousands/µL   Renal function panel    " "Collection Time: 11/25/24  9:37 AM   Result Value Ref Range    Albumin 4.2 3.5 - 5.0 g/dL    Calcium 10.0 8.4 - 10.2 mg/dL    Phosphorus 2.7 2.3 - 4.1 mg/dL    Glucose 94 65 - 140 mg/dL    BUN 23 5 - 25 mg/dL    Creatinine 1.38 (H) 0.60 - 1.30 mg/dL    Sodium 141 135 - 147 mmol/L    Potassium 4.7 3.5 - 5.3 mmol/L    Chloride 103 96 - 108 mmol/L    CO2 25 21 - 32 mmol/L    ANION GAP 13 4 - 13 mmol/L    eGFR 45 ml/min/1.73sq m   PTH, intact    Collection Time: 11/25/24  9:37 AM   Result Value Ref Range    PTH 82.0 12.0 - 88.0 pg/mL   Protein / creatinine ratio, urine    Collection Time: 11/25/24  9:37 AM   Result Value Ref Range    Creatinine, Ur 92.9 Reference range not established. mg/dL    Protein Urine Random 6.3 Reference range not established. mg/dL    Prot/Creat Ratio, Ur 0.1 0.0 - 0.1   PSA Total, Diagnostic    Collection Time: 11/25/24  9:38 AM   Result Value Ref Range    PSA, Diagnostic 12.457 (H) 0.000 - 4.000 ng/mL               Portions of the record may have been created with voice recognition software. Occasional wrong word or \"sound a like\" substitutions may have occurred due to the inherent limitations of voice recognition software. Read the chart carefully and recognize, using context, where substitutions have occurred.If you have any questions, please contact the dictating provider.  "

## 2024-12-05 NOTE — LETTER
December 5, 2024     Joss Diaz, DO  [address] [facility], [place] [address]    Patient: Hung Poe   YOB: 1935   Date of Visit: 12/5/2024       Dear Dr. Diaz:    Thank you for referring Hung Poe to me for evaluation. Below are my notes for this consultation.    If you have questions, please do not hesitate to call me. I look forward to following your patient along with you.         Sincerely,        Raman Li MD        CC: No Recipients    Raman Li MD  12/5/2024 11:53 AM  Sign when Signing Visit  OFFICE FOLLOW UP - Nephrology   Hung Poe 88 y.o. male MRN: [id number]       ASSESSMENT and PLAN:  Assessment & Plan  Benign hypertension with CKD (chronic kidney disease) stage III ([facility])  Lab Results   Component Value Date    EGFR 45 11/25/2024    EGFR 48 04/19/2024    EGFR 39 04/15/2024    CREATININE 1.38 (H) 11/25/2024    CREATININE 1.33 (H) 04/19/2024    CREATININE 1.56 (H) 04/15/2024   CKD stage III with creatinine fluctuating around 1.3-1.6.  CKD suspected secondary to hypertension, morbid obesity, age-related nephron loss  Renal function overall stable, blood pressure well-controlled after recheck.  Once again discussed about importance to keep working on losing weight, follow low-salt diet and avoid NSAIDs.  Plan to see him back in the office in 1 year with repeat labs  Stage 3a chronic kidney disease ([facility])  Lab Results   Component Value Date    EGFR 45 11/25/2024    EGFR 48 04/19/2024    EGFR 39 04/15/2024    CREATININE 1.38 (H) 11/25/2024    CREATININE 1.33 (H) 04/19/2024    CREATININE 1.56 (H) 04/15/2024   Stable.  Follow-up in 1 year  Essential hypertension  Blood pressure improved after recheck.  Patient is on combination lisinopril with HCTZ.  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  On pyridostigmine as per neurology          Patient Instructions   As we discussed in the office visit and after reviewing your most recent blood test your kidney function remains fairly stable with a most recent creatinine around 1.38.  Once again discussed about importance to follow low-sodium diet.  Keep working on losing weight.  Avoid NSAIDs (no ibuprofen, Motrin, Advil, Aleve, naproxen).  Okay to take Tylenol or acetaminophen as needed for pain.  Follow-up with your primary care doctor, urologist, neurologist.  I would like to see you back in the office in 1 year with repeat labs.          HPI: Hung Poe is a 88 y.o. male who is here for Follow-up and Chronic Kidney Disease  .    Patient with history of CKD creatinine fluctuating around 1.3-1.6, hypertension, morbid obesity, osteoarthrosis status post right knee arthroplasty on 6/2021, last time seeing on 11/2023, today returns to the office for year follow-up with his wife    Since our last visit, was evaluated in the emergency department on 11/2023 due to ptosis, found to have myasthenia gravis, started on pyridostigmine, currently follows with neurology.   Patient with elevated PSA, follows with urology    Patient currently has no complaints at this time and is feeling well than some hip pain for what he follows with orthopedics.   Patient denies any chest pain, shortness of breath and swelling.   Denies any abdominal pain, no nausea, vomiting, no diarrhea or constipation.  Denies any urinary problems, no burning sensation or gross hematuria.  Denies NSAID use.  Noted he lost 7 pounds since last office visit 1 year ago    The last blood work was done on 11/25/2024, which we have reviewed together.  Recent  creatinine 1.38 with an estimated GFR of 45, UPC 0.1    ROS:   All the systems were reviewed and were negative except as documented on the HPI.    Allergies: Patient has no known allergies.    Medications:   Current Outpatient Medications:   •  lisinopril-hydrochlorothiazide (PRINZIDE,ZESTORETIC) 20-25 MG per tablet, Take 1 tablet by mouth daily, Disp: , Rfl:   •  LUMIGAN 0.01 % ophthalmic drops, Administer into the left eye daily at bedtime , Disp: , Rfl:   •  pyridostigmine (Mestinon) 60 mg tablet, Take 1 tablet (60 mg total) by mouth 4 (four) times a day, Disp: 360 tablet, Rfl: 1  •  aspirin 81 mg chewable tablet, Chew daily  (Patient not taking: Reported on 11/15/2023), Disp: , Rfl:   •  docusate sodium (COLACE) 100 mg capsule, Take 1 capsule (100 mg total) by mouth 2 (two) times a day (Patient not taking: Reported on 9/10/2021), Disp: 10 capsule, Rfl: 0  •  enoxaparin (LOVENOX) 40 mg/0.4 mL, Inject 0.4 mL (40 mg total) under the skin daily (Patient not taking: Reported on 9/10/2021), Disp: 28 mL, Rfl: 0  •  gabapentin (NEURONTIN) 100 mg capsule, Take 100 mg by mouth 2 (two) times a day (Patient not taking: Reported on 7/10/2024), Disp: , Rfl:   •  oxyCODONE (ROXICODONE) 5 mg immediate release tablet, 1 pill po Q6 Hrs prn severe pain (Patient not taking: Reported on 9/10/2021), Disp: 20 tablet, Rfl: 0  •  triamcinolone (KENALOG) 0.1 % ointment, Apply topically twice a day for up to 14 days to active areas as needed. May repeat course after taking 1 week break. Do not use on face, groin, armpits. Do not use on open skin (Patient not taking: Reported on 4/18/2024), Disp: 453.6 g, Rfl: 1    Past Medical History:   Diagnosis Date   • Chronic kidney disease    • Hypertension    • Sleep apnea     no CPAP      Past Surgical History:   Procedure Laterality Date   • CARPAL TUNNEL RELEASE     • COLONOSCOPY     • [place] ARTHROCENTESIS ASPIR&/INJ MAJOR JT/BURSA W/O US  6/14/2018    Procedure: ASPIRATION RIGHT KNEE BURSA;   "Surgeon: Billy Villalobos MD;  [facility];  Service: Orthopedics   • [place] ARTHRP KNE CONDYLE&PLATU MEDIAL&LAT COMPARTMENTS Left 6/14/2018    Procedure: ARTHROPLASTY KNEE TOTAL;  Surgeon: Billy Villalobos MD;  [facility];  Service: Orthopedics   • WI ARTHRP KNE CONDYLE&PLATU MEDIAL&LAT COMPARTMENTS Right 6/16/2021    Procedure: ARTHROPLASTY KNEE TOTAL;  Surgeon: Billy Villalobos MD;  [facility];  Service: Orthopedics     Family History   Problem Relation Age of Onset   • No Known Problems Father       reports that he has quit smoking. He has never used smokeless tobacco. He reports that he does not currently use alcohol. He reports that he does not use drugs.      Physical Exam:   Vitals:    12/05/24 1119 12/05/24 1142   BP: 140/78 121/79   BP Location: Left arm Left arm   Patient Position: Sitting Sitting   Cuff Size: Adult Large   Pulse: 75    SpO2: 92%    Weight: 131 kg (288 lb)    Height: 5' 9\" (1.753 m)      Body mass index is 42.53 kg/m².    General: Morbidly obese, cooperative, in not acute distress  Eyes: conjunctivae pink, anicteric sclerae  ENT: lips and mucous membranes moist  Neck: supple, no JVD  Chest: clear breath sounds bilateral, no crackles, ronchus or wheezings  CVS: distinct S1 & S2, normal rate, regular rhythm  Abdomen: soft, non-tender, non-distended, normoactive bowel sounds  Back: no CVA tenderness  Extremities: no edema of both legs  Skin: no rash, chronic skin changes in lower extremities  Neuro: awake, alert, oriented      Lab Results:  Results for orders placed or performed in visit on 11/25/24   CBC and differential    Collection Time: 11/25/24  9:37 AM   Result Value Ref Range    WBC 6.05 4.31 - 10.16 Thousand/uL    RBC 4.52 3.88 - 5.62 Million/uL    Hemoglobin 14.9 12.0 - 17.0 g/dL    Hematocrit 45.5 36.5 - 49.3 %     (H) 82 - 98 fL    MCH 33.0 26.8 - 34.3 pg    MCHC 32.7 31.4 - 37.4 g/dL    RDW 12.8 11.6 - 15.1 %    MPV 10.4 8.9 - 12.7 fL    Platelets " "199 149 - 390 Thousands/uL    nRBC 0 /100 WBCs    Segmented % 67 43 - 75 %    Immature Grans % 0 0 - 2 %    Lymphocytes % 20 14 - 44 %    Monocytes % 10 4 - 12 %    Eosinophils Relative 2 0 - 6 %    Basophils Relative 1 0 - 1 %    Absolute Neutrophils 4.04 1.85 - 7.62 Thousands/µL    Absolute Immature Grans 0.01 0.00 - 0.20 Thousand/uL    Absolute Lymphocytes 1.23 0.60 - 4.47 Thousands/µL    Absolute Monocytes 0.58 0.17 - 1.22 Thousand/µL    Eosinophils Absolute 0.13 0.00 - 0.61 Thousand/µL    Basophils Absolute 0.06 0.00 - 0.10 Thousands/µL   Renal function panel    Collection Time: 11/25/24  9:37 AM   Result Value Ref Range    Albumin 4.2 3.5 - 5.0 g/dL    Calcium 10.0 8.4 - 10.2 mg/dL    Phosphorus 2.7 2.3 - 4.1 mg/dL    Glucose 94 65 - 140 mg/dL    BUN 23 5 - 25 mg/dL    Creatinine 1.38 (H) 0.60 - 1.30 mg/dL    Sodium 141 135 - 147 mmol/L    Potassium 4.7 3.5 - 5.3 mmol/L    Chloride 103 96 - 108 mmol/L    CO2 25 21 - 32 mmol/L    ANION GAP 13 4 - 13 mmol/L    eGFR 45 ml/min/1.73sq m   PTH, intact    Collection Time: 11/25/24  9:37 AM   Result Value Ref Range    PTH 82.0 12.0 - 88.0 pg/mL   Protein / creatinine ratio, urine    Collection Time: 11/25/24  9:37 AM   Result Value Ref Range    Creatinine, Ur 92.9 Reference range not established. mg/dL    Protein Urine Random 6.3 Reference range not established. mg/dL    Prot/Creat Ratio, Ur 0.1 0.0 - 0.1   PSA Total, Diagnostic    Collection Time: 11/25/24  9:38 AM   Result Value Ref Range    PSA, Diagnostic 12.457 (H) 0.000 - 4.000 ng/mL               Portions of the record may have been created with voice recognition software. Occasional wrong word or \"sound a like\" substitutions may have occurred due to the inherent limitations of voice recognition software. Read the chart carefully and recognize, using context, where substitutions have occurred.If you have any questions, please contact the dictating provider.  "

## 2024-12-05 NOTE — ASSESSMENT & PLAN NOTE
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  On pyridostigmine as per neurology

## 2024-12-05 NOTE — ASSESSMENT & PLAN NOTE
Lab Results   Component Value Date    EGFR 45 11/25/2024    EGFR 48 04/19/2024    EGFR 39 04/15/2024    CREATININE 1.38 (H) 11/25/2024    CREATININE 1.33 (H) 04/19/2024    CREATININE 1.56 (H) 04/15/2024   CKD stage III with creatinine fluctuating around 1.3-1.6.  CKD suspected secondary to hypertension, morbid obesity, age-related nephron loss  Renal function overall stable, blood pressure well-controlled after recheck.  Once again discussed about importance to keep working on losing weight, follow low-salt diet and avoid NSAIDs.  Plan to see him back in the office in 1 year with repeat labs

## 2024-12-05 NOTE — PATIENT INSTRUCTIONS
As we discussed in the office visit and after reviewing your most recent blood test your kidney function remains fairly stable with a most recent creatinine around 1.38.  Once again discussed about importance to follow low-sodium diet.  Keep working on losing weight.  Avoid NSAIDs (no ibuprofen, Motrin, Advil, Aleve, naproxen).  Okay to take Tylenol or acetaminophen as needed for pain.  Follow-up with your primary care doctor, urologist, neurologist.  I would like to see you back in the office in 1 year with repeat labs.

## 2024-12-23 ENCOUNTER — PREP FOR PROCEDURE (OUTPATIENT)
Dept: UROLOGY | Facility: CLINIC | Age: 89
End: 2024-12-23

## 2024-12-23 ENCOUNTER — OFFICE VISIT (OUTPATIENT)
Dept: UROLOGY | Facility: CLINIC | Age: 89
End: 2024-12-23

## 2024-12-23 ENCOUNTER — TELEPHONE (OUTPATIENT)
Dept: UROLOGY | Facility: CLINIC | Age: 89
End: 2024-12-23

## 2024-12-23 VITALS
BODY MASS INDEX: 41.92 KG/M2 | DIASTOLIC BLOOD PRESSURE: 70 MMHG | OXYGEN SATURATION: 92 % | HEIGHT: 69 IN | WEIGHT: 283 LBS | SYSTOLIC BLOOD PRESSURE: 120 MMHG | HEART RATE: 105 BPM

## 2024-12-23 DIAGNOSIS — R97.20 ELEVATED PSA: Primary | ICD-10-CM

## 2024-12-23 NOTE — PROGRESS NOTES
Office Visit- Urology  Hung Poe 1935 MRN: [id number]      Assessment/Discussion/Plan    88 y.o. male managed by     Elevated PSA  -Patient with a longstanding history of elevated PSA  -Last had a PSA of 5.2 and then subsequently 4.2  -Patient had updated PSA which returned at 9.3 with a total free PSA percentage of 12.2%  DARREN in April 2024 returned without palpable abnormality  -After thorough discussion about prostate cancer screening/elevated PSA/prostate cancer patient and spouse opted to proceed with repeat PSA testing and PSA returned at 8.29 in April 2024  -had a multiparametric MRI the prostate in May 2024 that returned with a PI-RADS 4 lesion at the left posterior lateral peripheral zone   -patient was seen by physician and there is discussion about proceeding with biopsy versus observation.  Patient opted for observation  -Most recent PSA returned the highest it has been at 12.4  -Once again discussed with patient the possible clinical implication of his PI-RADS 4 lesion and continued elevated PSA now at 12.  Advised him that I do think a biopsy is reasonable for definitive rule out/rule in a prostate cancer as well as characterization of prostate cancer grade if applicable.  Discussed with patient that if he did have clinically significant prostate cancer he would in all likelihood only be a candidate for radiation therapy for primary treatment given his age and risk of surgical intervention at this point in time  -Reviewed rationale for both observation versus biopsy.  Reviewed that with observation we would mitigate the risk of overdiagnosis/overtreatment of prostate cancer (performing intervention that would not otherwise change course for patient ultimately) versus proceeding with prostate biopsy with mitigation of potential risk of morbidity/mortality from prostate cancer and situation that aggressive or clinically significant prostate cancer is present.  Discussed MRI fusion transperineal  "prostate biopsy.  Reviewed Dr. Carvajal's note that \" most likely should have a biopsy\"  -After thorough discussion today in the office patient and spouse are agreeable to proceed with prostate biopsy.  We will plan for an MRI fusion transperineal prostate biopsy informed consent was signed in the office today through E consent.  Risk of infection including sepsis, blood in the urine, ejaculate, or per rectum, or urinary retnetiom were reviewed with patient     Chief Complaint:   Hung is a 88 y.o. male presenting to the office for a follow up visit regarding elevated PSA        Subjective    Hx 4/18/2024  Patient is a 88-year-old gentleman with a history of BPH with no significant urinary tract symptoms and elevated PSA who presents to the office for follow-up.  Last seen in the office in November 2021.  At this point in time his PSA was measuring 4.2 from a 5.2 previously ordered.  Had a prostate exam with no nodularity noted.  He was advised to follow-up on an as-needed basis.  3 presents to the office today due to concern for low urinary tract symptoms such as frequency, urgency, urge incontinence.  No dysuria or gross hematuria.  He also obtained a updated PSA which returned significantly elevated at 9.3 with a total free PSA percentage of 12.2%.  No known family history of prostate cancer.  Patient has not had a biopsy before     Hx 12/23/2024  Patient presents to the office today for follow-up he is accompanied by his spouse.  He returns to the office discuss his PSA and the interventions he is lasting by myself he had an MRI that returned with a PI-RADS 4 lesion he met with Dr. Carvajal and plan was for observation.  He obtained an updated PSA which returned at the highest it has yet been measured at 12.4        ROS:   Review of Systems   Constitutional: Negative.  Negative for chills, fatigue and fever.   HENT: Negative.     Respiratory:  Negative for shortness of breath.    Cardiovascular:  Negative for " chest pain.   Gastrointestinal: Negative.  Negative for abdominal pain.   Endocrine: Negative.    Musculoskeletal: Negative.    Skin: Negative.    Neurological: Negative.  Negative for dizziness and light-headedness.   Hematological: Negative.    Psychiatric/Behavioral: Negative.           Past Medical History  Past Medical History:   Diagnosis Date    Chronic kidney disease     Hypertension     Sleep apnea     no CPAP        Past Surgical History  Past Surgical History:   Procedure Laterality Date    CARPAL TUNNEL RELEASE      COLONOSCOPY      MA ARTHROCENTESIS ASPIR&/INJ MAJOR JT/BURSA W/O US  6/14/2018    Procedure: ASPIRATION RIGHT KNEE BURSA;  Surgeon: Billy Villalobos MD;  Location: [facility];  Service: Orthopedics    MA ARTHRP KNE CONDYLE&PLATU MEDIAL&LAT COMPARTMENTS Left 6/14/2018    Procedure: ARTHROPLASTY KNEE TOTAL;  Surgeon: Billy Villalobos MD;  Location: [facility];  Service: Orthopedics    MA ARTHRP KNE CONDYLE&PLATU MEDIAL&LAT COMPARTMENTS Right 6/16/2021    Procedure: ARTHROPLASTY KNEE TOTAL;  Surgeon: Billy Villalobos MD;  Location: [facility];  Service: Orthopedics       Past Family History  Family History   Problem Relation Age of Onset    No Known Problems Father        Past Social history  Social History     Socioeconomic History    Marital status: /Civil Union     Spouse name: Not on file    Number of children: Not on file    Years of education: Not on file    Highest education level: Not on file   Occupational History    Not on file   Tobacco Use    Smoking status: Former    Smokeless tobacco: Never    Tobacco comments:     quit 50 years ago    Vaping Use    Vaping status: Never Used   Substance and Sexual Activity    Alcohol use: Not Currently    Drug use: No    Sexual activity: Not on file   Other Topics Concern    Not on file   Social History Narrative    Not on file     Social Drivers of Health     Financial Resource Strain: Not on file   Food Insecurity: Not on file    Transportation Needs: Not on file   Physical Activity: Not on file   Stress: Not on file   Social Connections: Not on file   Intimate Partner Violence: Not on file   Housing Stability: Not on file       Current Medications  Current Outpatient Medications   Medication Sig Dispense Refill    aspirin 81 mg chewable tablet Chew daily  (Patient not taking: Reported on 11/15/2023)      docusate sodium (COLACE) 100 mg capsule Take 1 capsule (100 mg total) by mouth 2 (two) times a day (Patient not taking: Reported on 9/10/2021) 10 capsule 0    enoxaparin (LOVENOX) 40 mg/0.4 mL Inject 0.4 mL (40 mg total) under the skin daily (Patient not taking: Reported on 9/10/2021) 28 mL 0    gabapentin (NEURONTIN) 100 mg capsule Take 100 mg by mouth 2 (two) times a day (Patient not taking: Reported on 7/10/2024)      lisinopril-hydrochlorothiazide (PRINZIDE,ZESTORETIC) 20-25 MG per tablet Take 1 tablet by mouth daily      LUMIGAN 0.01 % ophthalmic drops Administer into the left eye daily at bedtime       oxyCODONE (ROXICODONE) 5 mg immediate release tablet 1 pill po Q6 Hrs prn severe pain (Patient not taking: Reported on 9/10/2021) 20 tablet 0    pyridostigmine (Mestinon) 60 mg tablet Take 1 tablet (60 mg total) by mouth 4 (four) times a day 360 tablet 1    triamcinolone (KENALOG) 0.1 % ointment Apply topically twice a day for up to 14 days to active areas as needed. May repeat course after taking 1 week break. Do not use on face, groin, armpits. Do not use on open skin (Patient not taking: Reported on 4/18/2024) 453.6 g 1     No current facility-administered medications for this visit.       Allergies  No Known Allergies    OBJECTIVE    Vitals   There were no vitals filed for this visit.    PVR:    Physical Exam  Constitutional:       General: He is not in acute distress.     Appearance: Normal appearance. He is normal weight. He is not ill-appearing or toxic-appearing.   HENT:      Head: Normocephalic and atraumatic.   Eyes:       Conjunctiva/sclera: Conjunctivae normal.   Cardiovascular:      Rate and Rhythm: Normal rate.   Pulmonary:      Effort: Pulmonary effort is normal. No respiratory distress.   Skin:     General: Skin is warm and dry.   Neurological:      General: No focal deficit present.      Mental Status: He is alert and oriented to person, place, and time.      Cranial Nerves: No cranial nerve deficit.   Psychiatric:         Mood and Affect: Mood normal.         Behavior: Behavior normal.         Thought Content: Thought content normal.          Labs:     Lab Results   Component Value Date    PSA 12.457 (H) 11/25/2024    PSA 8.29 (H) 04/19/2024    PSA 9.3 (H) 04/12/2024    PSA 4.3 (H) 10/23/2020     Lab Results   Component Value Date    CREATININE 1.38 (H) 11/25/2024      Lab Results   Component Value Date    HGBA1C 5.8 (H) 05/21/2021     Lab Results   Component Value Date    CALCIUM 10.0 11/25/2024    K 4.7 11/25/2024    CO2 25 11/25/2024     11/25/2024    BUN 23 11/25/2024    CREATININE 1.38 (H) 11/25/2024       I have personally reviewed all pertinent lab results and reviewed with patient    Imaging       Ron Fisher PA-C  Date: 12/23/2024 Time: 10:46 AM  [facility]    This note was written using fluency dictation software. Please excuse any resulting minor grammatical errors.

## 2024-12-23 NOTE — TELEPHONE ENCOUNTER
Please help with scheduling for the next available MRI fusion transperineal prostate biopsy.  Patient utilizes aspirin. informed consent was signed in office today

## 2025-01-28 ENCOUNTER — PREP FOR PROCEDURE (OUTPATIENT)
Dept: UROLOGY | Facility: MEDICAL CENTER | Age: 89
End: 2025-01-28

## 2025-01-28 DIAGNOSIS — R97.20 ELEVATED PSA: Primary | ICD-10-CM

## 2025-01-28 DIAGNOSIS — Z01.812 PRE-OPERATIVE LABORATORY EXAMINATION: ICD-10-CM

## 2025-01-28 DIAGNOSIS — R39.89 SUSPECTED UTI: ICD-10-CM

## 2025-01-28 DIAGNOSIS — Z01.810 PRE-OPERATIVE CARDIOVASCULAR EXAMINATION: ICD-10-CM

## 2025-02-13 ENCOUNTER — TELEPHONE (OUTPATIENT)
Dept: NEPHROLOGY | Facility: CLINIC | Age: 89
End: 2025-02-13

## 2025-02-13 NOTE — TELEPHONE ENCOUNTER
Called patient to reschedule his appt on 11/7/25.  Dr. Li will not be in the office that day. Patient appt is reschedule on 10/24/25 with Dr. Li.

## 2025-03-05 ENCOUNTER — OFFICE VISIT (OUTPATIENT)
Dept: DERMATOLOGY | Facility: CLINIC | Age: 89
End: 2025-03-05
Payer: COMMERCIAL

## 2025-03-05 VITALS — WEIGHT: 283 LBS | TEMPERATURE: 98.2 F | BODY MASS INDEX: 41.92 KG/M2 | HEIGHT: 69 IN

## 2025-03-05 DIAGNOSIS — R21 RASH: Primary | ICD-10-CM

## 2025-03-05 PROCEDURE — 99214 OFFICE O/P EST MOD 30 MIN: CPT | Performed by: DERMATOLOGY

## 2025-03-05 RX ORDER — MUPIROCIN 20 MG/G
OINTMENT TOPICAL 2 TIMES DAILY
Qty: 22 G | Refills: 0 | Status: SHIPPED | OUTPATIENT
Start: 2025-03-05

## 2025-03-05 RX ORDER — CLOBETASOL PROPIONATE 0.5 MG/G
OINTMENT TOPICAL 2 TIMES DAILY
Qty: 30 G | Refills: 0 | Status: SHIPPED | OUTPATIENT
Start: 2025-03-05

## 2025-03-05 NOTE — PATIENT INSTRUCTIONS
RASH    Physical Exam:  (Anatomic Location); (Size and Morphological Description); (Differential Diagnosis):  Left elbow with swollen purple pink plaque with crust   Pertinent Positives:  Pertinent Negatives:    Assessment and Plan:  Based on a thorough discussion of this condition and the management approach to it (including a comprehensive discussion of the known risks, side effects and potential benefits of treatment), the patient (family) agrees to implement the following specific plan:  Prescribed Mupirocin and clobetasol ointment to be applied twice a day for NO more than 2 weeks maximum. Mix two ointments together before applying to left elbow. Apply a wrap on at night before bed after applying   Scrape test done in office today   Results:NEGATIVE  Follow up in 2 weeks

## 2025-03-05 NOTE — PROGRESS NOTES
"[facility]atology Clinic Note     Patient Name: Hung Poe  Encounter Date: 3/5/25     Have you been cared for by a [facility] Dermatologist in the last 3 years and, if so, which description applies to you?    Yes.  I have been here within the last 3 years, and my medical history has NOT changed since that time.  I am MALE/not capable of bearing children.    REVIEW OF SYSTEMS:  Have you recently had or currently have any of the following? No changes in my recent health.   PAST MEDICAL HISTORY:  Have you personally ever had or currently have any of the following?  If \"YES,\" then please provide more detail. No changes in my medical history.   HISTORY OF IMMUNOSUPPRESSION: Do you have a history of any of the following:  Systemic Immunosuppression such as Diabetes, Biologic or Immunotherapy, Chemotherapy, Organ Transplantation, Bone Marrow Transplantation or Prednisone?  No     Answering \"YES\" requires the addition of the dotphrase \"IMMUNOSUPPRESSED\" as the first diagnosis of the patient's visit.   FAMILY HISTORY:  Any \"first degree relatives\" (parent, brother, sister, or child) with the following?    No changes in my family's known health.   PATIENT EXPERIENCE:    Do you want the Dermatologist to perform a COMPLETE skin exam today including a clinical examination under the \"bra and underwear\" areas?  No  If necessary, do we have your permission to call and leave a detailed message on your Preferred Phone number that includes your specific medical information?  Yes      No Known Allergies   Current Outpatient Medications:     lisinopril-hydrochlorothiazide (PRINZIDE,ZESTORETIC) 20-25 MG per tablet, Take 1 tablet by mouth daily, Disp: , Rfl:     LUMIGAN 0.01 % ophthalmic drops, Administer into the left eye daily at bedtime , Disp: , Rfl:     pyridostigmine (Mestinon) 60 mg tablet, Take 1 tablet (60 mg total) by mouth 4 (four) times a day, Disp: 360 tablet, Rfl: 1    aspirin 81 mg chewable tablet, Chew daily  " (Patient not taking: Reported on 11/15/2023), Disp: , Rfl:     docusate sodium (COLACE) 100 mg capsule, Take 1 capsule (100 mg total) by mouth 2 (two) times a day (Patient not taking: Reported on 9/10/2021), Disp: 10 capsule, Rfl: 0    enoxaparin (LOVENOX) 40 mg/0.4 mL, Inject 0.4 mL (40 mg total) under the skin daily (Patient not taking: Reported on 9/10/2021), Disp: 28 mL, Rfl: 0    gabapentin (NEURONTIN) 100 mg capsule, Take 100 mg by mouth 2 (two) times a day (Patient not taking: Reported on 7/10/2024), Disp: , Rfl:     oxyCODONE (ROXICODONE) 5 mg immediate release tablet, 1 pill po Q6 Hrs prn severe pain (Patient not taking: Reported on 9/10/2021), Disp: 20 tablet, Rfl: 0    triamcinolone (KENALOG) 0.1 % ointment, Apply topically twice a day for up to 14 days to active areas as needed. May repeat course after taking 1 week break. Do not use on face, groin, armpits. Do not use on open skin (Patient not taking: Reported on 4/18/2024), Disp: 453.6 g, Rfl: 1          Whom besides the patient is providing clinical information about today's encounter?   NO ADDITIONAL HISTORIAN (patient alone provided history)    Physical Exam and Assessment/Plan by Diagnosis:    RASH- eczema vs majocchi granuloma    Physical Exam:  (Anatomic Location); (Size and Morphological Description); (Differential Diagnosis):  Left elbow with swollen purple pink plaque with crust   Pertinent Positives:  Pertinent Negatives:    Additional History of Present Condition:  Patient presents today for rash follow up with wife. Last evaluated by Dr. Manriquez on 1/16/24. According to wife, rash has been present for about a year on left arm and oozes at night. Patient notes another outbreak on right ankle that tends to get itchy. Currently uses over the counter  Aveeno     Assessment and Plan:  Based on a thorough discussion of this condition and the management approach to it (including a comprehensive discussion of the known risks, side effects and  potential benefits of treatment), the patient (family) agrees to implement the following specific plan:  Prescribed Mupirocin and clobetasol ointment to be applied twice a day for NO more than 2 weeks maximum. Mix two ointments together before applying to left elbow. Apply a wrap on at night before bed after applying   KOH test done in office today   Results:NEGATIVE  Follow up in 2 weeks   Consider majocchi granuloma, biopsy        Scribe Attestation      I,:  Haylee Woodard MA am acting as a scribe while in the presence of the attending physician.:       I,:  Sandra Manriquez MD personally performed the services described in this documentation    as scribed in my presence.:

## 2025-03-21 ENCOUNTER — TELEPHONE (OUTPATIENT)
Age: 89
End: 2025-03-21

## 2025-03-21 NOTE — TELEPHONE ENCOUNTER
Patient's wife called in. They received a denial of services letter from insurance and she feels it's regarding upcoming biopsy. There is a lot of verbiage she is confused about on the paperwork.

## 2025-03-26 ENCOUNTER — OFFICE VISIT (OUTPATIENT)
Dept: DERMATOLOGY | Facility: CLINIC | Age: 89
End: 2025-03-26
Payer: COMMERCIAL

## 2025-03-26 VITALS — WEIGHT: 289 LBS | TEMPERATURE: 96.7 F | BODY MASS INDEX: 42.68 KG/M2

## 2025-03-26 DIAGNOSIS — R21 RASH: Primary | ICD-10-CM

## 2025-03-26 DIAGNOSIS — Z09 FOLLOW-UP EXAM AFTER TREATMENT: ICD-10-CM

## 2025-03-26 PROCEDURE — 99213 OFFICE O/P EST LOW 20 MIN: CPT | Performed by: NURSE PRACTITIONER

## 2025-03-26 NOTE — PROGRESS NOTES
"[facility] Dermatology Clinic Note     Patient Name: Hung Poe  Encounter Date: 3/26/25     Have you been cared for by a [facility] Dermatologist in the last 3 years and, if so, which description applies to you?    Yes.  I have been here within the last 3 years, and my medical history has NOT changed since that time.  I am MALE/not capable of bearing children.    REVIEW OF SYSTEMS:  Have you recently had or currently have any of the following? No changes in my recent health.   PAST MEDICAL HISTORY:  Have you personally ever had or currently have any of the following?  If \"YES,\" then please provide more detail. No changes in my medical history.   HISTORY OF IMMUNOSUPPRESSION: Do you have a history of any of the following:  Systemic Immunosuppression such as Diabetes, Biologic or Immunotherapy, Chemotherapy, Organ Transplantation, Bone Marrow Transplantation or Prednisone?  No     Answering \"YES\" requires the addition of the dotphrase \"IMMUNOSUPPRESSED\" as the first diagnosis of the patient's visit.   FAMILY HISTORY:  Any \"first degree relatives\" (parent, brother, sister, or child) with the following?    No changes in my family's known health.   PATIENT EXPERIENCE:    Do you want the Dermatologist to perform a COMPLETE skin exam today including a clinical examination under the \"bra and underwear\" areas?  NO  If necessary, do we have your permission to call and leave a detailed message on your Preferred Phone number that includes your specific medical information?  Yes      No Known Allergies   Current Outpatient Medications:     lisinopril-hydrochlorothiazide (PRINZIDE,ZESTORETIC) 20-25 MG per tablet, Take 1 tablet by mouth daily, Disp: , Rfl:     LUMIGAN 0.01 % ophthalmic drops, Administer into the left eye daily at bedtime , Disp: , Rfl:     aspirin 81 mg chewable tablet, Chew daily  (Patient not taking: Reported on 11/15/2023), Disp: , Rfl:     clobetasol (TEMOVATE) 0.05 % ointment, Apply topically 2 (two) times " a day To left elbow for NO more than 2 weeks. Mix with Mupirocin ointment before applying (Patient not taking: Reported on 3/26/2025), Disp: 30 g, Rfl: 0    docusate sodium (COLACE) 100 mg capsule, Take 1 capsule (100 mg total) by mouth 2 (two) times a day (Patient not taking: Reported on 9/10/2021), Disp: 10 capsule, Rfl: 0    enoxaparin (LOVENOX) 40 mg/0.4 mL, Inject 0.4 mL (40 mg total) under the skin daily (Patient not taking: Reported on 9/10/2021), Disp: 28 mL, Rfl: 0    gabapentin (NEURONTIN) 100 mg capsule, Take 100 mg by mouth 2 (two) times a day (Patient not taking: Reported on 7/10/2024), Disp: , Rfl:     mupirocin (BACTROBAN) 2 % ointment, Apply topically 2 (two) times a day To left elbow for NO more than 2 weeks. Mix with clobetasol ointment before applying. (Patient not taking: Reported on 3/26/2025), Disp: 22 g, Rfl: 0    oxyCODONE (ROXICODONE) 5 mg immediate release tablet, 1 pill po Q6 Hrs prn severe pain (Patient not taking: Reported on 9/10/2021), Disp: 20 tablet, Rfl: 0    pyridostigmine (Mestinon) 60 mg tablet, Take 1 tablet (60 mg total) by mouth 4 (four) times a day, Disp: 360 tablet, Rfl: 1    triamcinolone (KENALOG) 0.1 % ointment, Apply topically twice a day for up to 14 days to active areas as needed. May repeat course after taking 1 week break. Do not use on face, groin, armpits. Do not use on open skin (Patient not taking: Reported on 4/18/2024), Disp: 453.6 g, Rfl: 1          Whom besides the patient is providing clinical information about today's encounter?   NO ADDITIONAL HISTORIAN (patient alone provided history)    Physical Exam and Assessment/Plan by Diagnosis:    RASH    Physical Exam:  (Anatomic Location); (Size and Morphological Description); (Differential Diagnosis):  Left elbow; PIH present         Additional History of Present Condition:  patient returns for f/u; last seen by Dr. Manriquez on 3/5/2025 for suspected eczema vs majocchi granuloma. She prescribed patient Mupirocin  and clobetasol ointment to be applied twice a day x 2 weeks. He reports rash has resolved.     Assessment and Plan:  Based on a thorough discussion of this condition and the management approach to it (including a comprehensive discussion of the known risks, side effects and potential benefits of treatment), the patient (family) agrees to implement the following specific plan:  If comes back, call office to schedule appointment for as soon as possible; will likely perform biopsy     Scribe Attestation      I,:  Savannah Bueno am acting as a scribe while in the presence of the attending physician.:       I,:  PARISH Moe personally performed the services described in this documentation    as scribed in my presence.:

## 2025-03-26 NOTE — PATIENT INSTRUCTIONS
RASH    Physical Exam:  (Anatomic Location); (Size and Morphological Description); (Differential Diagnosis):  Left elbow   Pertinent Positives:  Pertinent Negatives:    Additional History of Present Condition:  patient was applying Mupirocin and clobetasol ointment to be applied twice a day for NO more than 2 weeks     Assessment and Plan:  Based on a thorough discussion of this condition and the management approach to it (including a comprehensive discussion of the known risks, side effects and potential benefits of treatment), the patient (family) agrees to implement the following specific plan:  If comes back call office to schedule appointment for as soon as possible; will do biopsy then

## 2025-03-27 NOTE — TELEPHONE ENCOUNTER
Patient's wife called in asking for an update in regards to procedure scheduled for 5/17 about prior authorization. Spoke with prior auth department which explained that will need to be checked 30 days prior to procedure. Informed patient's wife of this information.

## 2025-04-22 DIAGNOSIS — G70.00 MYASTHENIA GRAVIS (HCC): ICD-10-CM

## 2025-04-23 RX ORDER — PYRIDOSTIGMINE BROMIDE 60 MG/1
60 TABLET ORAL 4 TIMES DAILY
Qty: 360 TABLET | Refills: 3 | Status: SHIPPED | OUTPATIENT
Start: 2025-04-23

## 2025-05-13 NOTE — PROGRESS NOTES
"Name: Hung Poe      : 1935      MRN: [id number]  Encounter Provider: Tino Hernandez MD  Encounter Date: 2025   Encounter department: 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  Myasthenia gravis usually generalizes with time but this has not occurred in his case.  He has been quite stable with the administration of Mestinon.  He is not entirely sure that he is benefiting, and if he forgets the dose he does not notice a difference.  I have told him he can go down on his medicine to 3 times daily which is much easier to remember.  He will let me know if there is any change and he understands he should not make further adjustments.  He will follow-up with me in 6 months, or sooner if needed.  All of his questions were answered and he is in agreement with this plan.               History of Present Illness   HPI     I had the pleasure of seeing Hung Poe, a 89 y.o. male, in follow-up.  Please be aware of the inherent limitations of voice recognition software, which may result in transcriptional errors.  Last year \"my eye went closed\".  He and his wife state only the right was involved but the medical records suggest that there was left-sided involvement in the past.  His droopy eye became constant but fluctuated in intensity.  He has not noticed any particular time of the day that he is more likely to be affected.  His vision was \"blurry\" but with further questioning he refers to difficulty seeing through his droopy eyelid.  He had a tendency to hold his eyelid open with his fingers but without diplopia or blurring of images.  He denies dyspnea, dysarthria, or dysphagia.  He has a history of bilateral knee replacement and pain in the feet, and as a result no longer does the lawn or walks as far.  He denies specific fatigue.  There is no activity that he feels that he cannot perform although he " "has a tendency to hold onto the handrail while he is walking.  Sometimes while he is walking his tongue gets \"stiff\".  CT angiogram of the head and neck were negative.  Laboratory testing showed elevated titers of acetylcholine receptor binding, blocking, and modulating antibodies.  CT of the chest showed no mediastinal pathology.  He is on pyridostigmine 60 mg 4 times daily.  Since we increased up to the current dose his ptosis is less severe.  He feels normal and is very pleased with his clinical course.  Sometimes he forgets a dose of his medicine but does not notice any difference.     He has a history of hypertension and chronic kidney disease.  There is a remote history of tobacco use but no history of substance abuse.  He sees urology in follow-up because of concerns about prostate malignancy    Appointment on 11/25/2024   Component Date Value Ref Range Status    WBC 11/25/2024 6.05  4.31 - 10.16 Thousand/uL Final    RBC 11/25/2024 4.52  3.88 - 5.62 Million/uL Final    Hemoglobin 11/25/2024 14.9  12.0 - 17.0 g/dL Final    Hematocrit 11/25/2024 45.5  36.5 - 49.3 % Final    MCV 11/25/2024 101 (H)  82 - 98 fL Final    MCH 11/25/2024 33.0  26.8 - 34.3 pg Final    MCHC 11/25/2024 32.7  31.4 - 37.4 g/dL Final    RDW 11/25/2024 12.8  11.6 - 15.1 % Final    MPV 11/25/2024 10.4  8.9 - 12.7 fL Final    Platelets 11/25/2024 199  149 - 390 Thousands/uL Final    nRBC 11/25/2024 0  /100 WBCs Final    Segmented % 11/25/2024 67  43 - 75 % Final    Immature Grans % 11/25/2024 0  0 - 2 % Final    Lymphocytes % 11/25/2024 20  14 - 44 % Final    Monocytes % 11/25/2024 10  4 - 12 % Final    Eosinophils Relative 11/25/2024 2  0 - 6 % Final    Basophils Relative 11/25/2024 1  0 - 1 % Final    Absolute Neutrophils 11/25/2024 4.04  1.85 - 7.62 Thousands/µL Final    Absolute Immature Grans 11/25/2024 0.01  0.00 - 0.20 Thousand/uL Final    Absolute Lymphocytes 11/25/2024 1.23  0.60 - 4.47 Thousands/µL Final    Absolute Monocytes " 11/25/2024 0.58  0.17 - 1.22 Thousand/µL Final    Eosinophils Absolute 11/25/2024 0.13  0.00 - 0.61 Thousand/µL Final    Basophils Absolute 11/25/2024 0.06  0.00 - 0.10 Thousands/µL Final    Albumin 11/25/2024 4.2  3.5 - 5.0 g/dL Final    Calcium 11/25/2024 10.0  8.4 - 10.2 mg/dL Final    Phosphorus 11/25/2024 2.7  2.3 - 4.1 mg/dL Final    Glucose 11/25/2024 94  65 - 140 mg/dL Final    If the patient is fasting, the ADA then defines impaired fasting glucose as > 100 mg/dL and diabetes as > or equal to 123 mg/dL.    BUN 11/25/2024 23  5 - 25 mg/dL Final    Creatinine 11/25/2024 1.38 (H)  0.60 - 1.30 mg/dL Final    Standardized to IDMS reference method    Sodium 11/25/2024 141  135 - 147 mmol/L Final    Potassium 11/25/2024 4.7  3.5 - 5.3 mmol/L Final    Chloride 11/25/2024 103  96 - 108 mmol/L Final    CO2 11/25/2024 25  21 - 32 mmol/L Final    ANION GAP 11/25/2024 13  4 - 13 mmol/L Final    eGFR 11/25/2024 45  ml/min/1.73sq m Final    PTH 11/25/2024 82.0  12.0 - 88.0 pg/mL Final    Creatinine, Ur 11/25/2024 92.9  Reference range not established. mg/dL Final    Protein Urine Random 11/25/2024 6.3  Reference range not established. mg/dL Final    Prot/Creat Ratio, Ur 11/25/2024 0.1  0.0 - 0.1 Final    PSA, Diagnostic 11/25/2024 12.457 (H)  0.000 - 4.000 ng/mL Final    [employer] Access chemiluminescent immunoassay. Confirm baseline values for patients being serially monitored.        No results found for this or any previous visit.     No results found for this or any previous visit.    No results found for this or any previous visit.    No results found for this or any previous visit.    No results found for this or any previous visit.    No results found for this or any previous visit.    No results found for this or any previous visit.    No results found for this or any previous visit.    No results found for this or any previous visit.    No results found for this or any previous visit.    No results found  for this or any previous visit.    No results found for this or any previous visit.        Review of Systems I have personally reviewed the MA's review of systems and made changes as necessary.  Review of Systems   Constitutional:  Negative for appetite change, fatigue and fever.   HENT: Negative.  Negative for hearing loss, tinnitus, trouble swallowing and voice change.    Eyes: Negative.  Negative for photophobia, pain and visual disturbance.   Respiratory: Negative.  Negative for shortness of breath.    Cardiovascular: Negative.  Negative for palpitations.   Gastrointestinal: Negative.  Negative for nausea and vomiting.   Endocrine: Negative.  Negative for cold intolerance.   Genitourinary: Negative.  Negative for dysuria, frequency and urgency.   Musculoskeletal:  Negative for back pain, gait problem, myalgias, neck pain and neck stiffness.   Skin: Negative.  Negative for rash.   Allergic/Immunologic: Negative.    Neurological:  Negative for dizziness, tremors, seizures, syncope, facial asymmetry, speech difficulty, weakness, light-headedness, numbness and headaches.   Hematological: Negative.  Does not bruise/bleed easily.   Psychiatric/Behavioral: Negative.  Negative for confusion, hallucinations and sleep disturbance.      Medical History Reviewed by provider this encounter:     .  Past Medical History   Past Medical History:   Diagnosis Date    Chronic kidney disease     Hypertension     Sleep apnea     no CPAP      Past Surgical History:   Procedure Laterality Date    CARPAL TUNNEL RELEASE      COLONOSCOPY      NM ARTHROCENTESIS ASPIR&/INJ MAJOR JT/BURSA W/O US  6/14/2018    Procedure: ASPIRATION RIGHT KNEE BURSA;  Surgeon: Billy Villalobos MD;  Location: [facility];  Service: Orthopedics    NM ARTHRP KNE CONDYLE&PLATU MEDIAL&LAT COMPARTMENTS Left 6/14/2018    Procedure: ARTHROPLASTY KNEE TOTAL;  Surgeon: Billy Villalobos MD;  Location: [facility];  Service: Orthopedics    NM ARTHRP KNE CONDYLE&PLATU  "MEDIAL&LAT COMPARTMENTS Right 6/16/2021    Procedure: ARTHROPLASTY KNEE TOTAL;  Surgeon: Billy Villalobos MD;  Location: [facility];  Service: Orthopedics     Family History   Problem Relation Age of Onset    No Known Problems Father       reports that he has quit smoking. He has never used smokeless tobacco. He reports that he does not currently use alcohol. He reports that he does not use drugs.  Current Outpatient Medications   Medication Instructions    aspirin 81 mg chewable tablet Daily    clobetasol (TEMOVATE) 0.05 % ointment Topical, 2 times daily, To left elbow for NO more than 2 weeks. Mix with Mupirocin ointment before applying    docusate sodium (COLACE) 100 mg, Oral, 2 times daily    enoxaparin (LOVENOX) 40 mg, Subcutaneous, Daily    gabapentin (NEURONTIN) 100 mg, 2 times daily    lisinopril-hydrochlorothiazide (PRINZIDE,ZESTORETIC) 20-25 MG per tablet 1 tablet, Daily    LUMIGAN 0.01 % ophthalmic drops Daily at bedtime    mupirocin (BACTROBAN) 2 % ointment Topical, 2 times daily, To left elbow for NO more than 2 weeks. Mix with clobetasol ointment before applying.    oxyCODONE (ROXICODONE) 5 mg immediate release tablet 1 pill po Q6 Hrs prn severe pain    pyridostigmine (MESTINON) 60 mg, Oral, 4 times daily    triamcinolone (KENALOG) 0.1 % ointment Apply topically twice a day for up to 14 days to active areas as needed. May repeat course after taking 1 week break. Do not use on face, groin, armpits. Do not use on open skin   Allergies[1]   Medications Ordered Prior to Encounter[2]   Social History     Tobacco Use    Smoking status: Former    Smokeless tobacco: Never    Tobacco comments:     quit 50 years ago    Vaping Use    Vaping status: Never Used   Substance and Sexual Activity    Alcohol use: Not Currently    Drug use: No    Sexual activity: Not on file        Objective   /74 (BP Location: Left arm, Patient Position: Sitting, Cuff Size: Large)   Pulse 74   Ht 5' 9\" (1.753 m)   Wt 125 kg (275 " lb)   BMI 40.61 kg/m²     Physical Exam  Neurological Exam    Cranial nerves:  II: Pupils equal, round, and reactive to light. No gross visual field defect. III, IV, VI: Extraocular movements intact. Constant right ptosis that was worse after the motor exam. No diplopia, even after the motor exam  V: Normal facial sensation in all three divisions of the trigeminal nerve bilaterally  VII: Normal facial strength  VIII: Hearing intact to finger rub bilaterally  IX, X: Palate elevated symmetrically  XI: Sternocleidomastoid strength normal bilaterally  XII: Tongue protruded in midline without atrophy or fibrillations     Motor:  Normal tone and bulk throughout.   Muscle strength testing by the MRC scale was 5/5 in the deltoid, biceps, triceps, wrist extensors, wrist flexors, finger extensors, finger flexors,. Hip flexors, quadriceps, ankle dorsiflexors, ankle plantar flexors, and EHL bilaterally  Normal neck flexors; no fatigueability        Sensation: Normal pinprick and light touch throughout     Cerebellar: normal finger to nose and heel to shin testing     Gait: Normal heel, toe, and tandem gait                 [1] No Known Allergies  [2]   Current Outpatient Medications on File Prior to Visit   Medication Sig Dispense Refill    lisinopril-hydrochlorothiazide (PRINZIDE,ZESTORETIC) 20-25 MG per tablet Take 1 tablet by mouth in the morning.      LUMIGAN 0.01 % ophthalmic drops Administer into the left eye daily at bedtime      pyridostigmine (MESTINON) 60 mg tablet TAKE 1 TABLET FOUR TIMES A  tablet 3    aspirin 81 mg chewable tablet Chew daily  (Patient not taking: Reported on 5/14/2025)      clobetasol (TEMOVATE) 0.05 % ointment Apply topically 2 (two) times a day To left elbow for NO more than 2 weeks. Mix with Mupirocin ointment before applying (Patient not taking: Reported on 3/26/2025) 30 g 0    docusate sodium (COLACE) 100 mg capsule Take 1 capsule (100 mg total) by mouth 2 (two) times a day (Patient not  taking: Reported on 9/10/2021) 10 capsule 0    enoxaparin (LOVENOX) 40 mg/0.4 mL Inject 0.4 mL (40 mg total) under the skin daily (Patient not taking: Reported on 9/10/2021) 28 mL 0    gabapentin (NEURONTIN) 100 mg capsule Take 100 mg by mouth 2 (two) times a day (Patient not taking: Reported on 7/10/2024)      mupirocin (BACTROBAN) 2 % ointment Apply topically 2 (two) times a day To left elbow for NO more than 2 weeks. Mix with clobetasol ointment before applying. (Patient not taking: Reported on 3/26/2025) 22 g 0    oxyCODONE (ROXICODONE) 5 mg immediate release tablet 1 pill po Q6 Hrs prn severe pain (Patient not taking: Reported on 9/10/2021) 20 tablet 0    triamcinolone (KENALOG) 0.1 % ointment Apply topically twice a day for up to 14 days to active areas as needed. May repeat course after taking 1 week break. Do not use on face, groin, armpits. Do not use on open skin (Patient not taking: Reported on 4/18/2024) 453.6 g 1     No current facility-administered medications on file prior to visit.

## 2025-05-14 ENCOUNTER — OFFICE VISIT (OUTPATIENT)
Dept: NEUROLOGY | Facility: CLINIC | Age: 89
End: 2025-05-14
Payer: COMMERCIAL

## 2025-05-14 VITALS
HEIGHT: 69 IN | WEIGHT: 275 LBS | DIASTOLIC BLOOD PRESSURE: 74 MMHG | HEART RATE: 74 BPM | BODY MASS INDEX: 40.73 KG/M2 | SYSTOLIC BLOOD PRESSURE: 114 MMHG

## 2025-05-14 DIAGNOSIS — G70.00 MYASTHENIA GRAVIS (HCC): Primary | ICD-10-CM

## 2025-05-14 PROCEDURE — 99214 OFFICE O/P EST MOD 30 MIN: CPT | Performed by: PSYCHIATRY & NEUROLOGY

## 2025-05-14 NOTE — ASSESSMENT & PLAN NOTE
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  Myasthenia gravis usually generalizes with time but this has not occurred in his case.  He has been quite stable with the administration of Mestinon.  He is not entirely sure that he is benefiting, and if he forgets the dose he does not notice a difference.  I have told him he can go down on his medicine to 3 times daily which is much easier to remember.  He will let me know if there is any change and he understands he should not make further adjustments.  He will follow-up with me in 6 months, or sooner if needed.  All of his questions were answered and he is in agreement with this plan.

## 2025-05-14 NOTE — PROGRESS NOTES
"Name: Hung Poe      : 1935      MRN: [id number]  Encounter Provider: Tino Hernandez MD  Encounter Date: 2025   Encounter department: [facility] [place]  :  Assessment & Plan      {Ambulatory Patient Instructions (Optional):50091}    History of Present Illness {?Quick Links Encounters * My Last Note * Last Note in Specialty * Snapshot * Since Last Visit * History :56655}  HPI   Review of Systems   Constitutional:  Negative for appetite change, fatigue and fever.   HENT: Negative.  Negative for hearing loss, tinnitus, trouble swallowing and voice change.    Eyes: Negative.  Negative for photophobia, pain and visual disturbance.   Respiratory: Negative.  Negative for shortness of breath.    Cardiovascular: Negative.  Negative for palpitations.   Gastrointestinal: Negative.  Negative for nausea and vomiting.   Endocrine: Negative.  Negative for cold intolerance.   Genitourinary: Negative.  Negative for dysuria, frequency and urgency.   Musculoskeletal:  Negative for back pain, gait problem, myalgias, neck pain and neck stiffness.   Skin: Negative.  Negative for rash.   Allergic/Immunologic: Negative.    Neurological:  Negative for dizziness, tremors, seizures, syncope, facial asymmetry, speech difficulty, weakness, light-headedness, numbness and headaches.   Hematological: Negative.  Does not bruise/bleed easily.   Psychiatric/Behavioral: Negative.  Negative for confusion, hallucinations and sleep disturbance.     I have personally reviewed the MA's review of systems and made changes as necessary.    {Select to insert medical history sections (Optional):05180}     Objective {?Quick Links Trend Vitals * Enter New Vitals * Results Review * Timeline (Adult) * Labs * Imaging * Cardiology * Procedures * Lung Cancer Screening * Surgical eConsent :62971}  /74 (BP Location: Left arm, Patient Position: Sitting, Cuff Size: Large)   Pulse 74   Ht 5' 9\" (1.753 m)   Wt 125 kg (275 " lb)   BMI 40.61 kg/m²     Physical Exam  Neurological Exam    {Radiology Results Review (Optional):64595}    {Administrative / Billing Section (Optional):93940}

## 2025-05-20 ENCOUNTER — TELEPHONE (OUTPATIENT)
Age: 89
End: 2025-05-20

## 2025-05-20 DIAGNOSIS — R97.20 ELEVATED PSA: Primary | ICD-10-CM

## 2025-05-20 NOTE — TELEPHONE ENCOUNTER
PT wife Heidi called and stated they received a call to have pt repeat PSA in 5/2025. Please review if needed and place script in pt chart to complete. Please call pt when script is in chart to have completed     Pt call back-[phone]

## 2025-05-21 NOTE — TELEPHONE ENCOUNTER
Called and spoke to pt's wife. Scheduled f/u for 6/24 with Dr. Carvajal. She knows that the pt will have to get a PSA prior to the visit.    Hilton Carvajal MD to Erica Canchola MA       4/23/25 12:59 PM  Note     Ok.  Please advise he should return in maybe may or June with me or georgie to review another PSA just to ensure it is not going too high.

## 2025-06-16 ENCOUNTER — APPOINTMENT (OUTPATIENT)
Dept: LAB | Facility: MEDICAL CENTER | Age: 89
End: 2025-06-16
Attending: UROLOGY
Payer: COMMERCIAL

## 2025-06-16 DIAGNOSIS — R97.20 ELEVATED PSA: ICD-10-CM

## 2025-06-16 DIAGNOSIS — Z01.812 PRE-OPERATIVE LABORATORY EXAMINATION: ICD-10-CM

## 2025-06-16 DIAGNOSIS — N18.31 STAGE 3A CHRONIC KIDNEY DISEASE (HCC): ICD-10-CM

## 2025-06-16 DIAGNOSIS — N18.30 BENIGN HYPERTENSION WITH CKD (CHRONIC KIDNEY DISEASE) STAGE III (HCC): ICD-10-CM

## 2025-06-16 DIAGNOSIS — I12.9 BENIGN HYPERTENSION WITH CKD (CHRONIC KIDNEY DISEASE) STAGE III (HCC): ICD-10-CM

## 2025-06-16 LAB
ALBUMIN SERPL BCG-MCNC: 4.4 G/DL (ref 3.5–5)
ALP SERPL-CCNC: 82 U/L (ref 34–104)
ALT SERPL W P-5'-P-CCNC: 13 U/L (ref 7–52)
ANION GAP SERPL CALCULATED.3IONS-SCNC: 9 MMOL/L (ref 4–13)
AST SERPL W P-5'-P-CCNC: 14 U/L (ref 13–39)
BASOPHILS # BLD AUTO: 0.07 THOUSANDS/ÂΜL (ref 0–0.1)
BASOPHILS NFR BLD AUTO: 1 % (ref 0–1)
BILIRUB SERPL-MCNC: 0.97 MG/DL (ref 0.2–1)
BUN SERPL-MCNC: 21 MG/DL (ref 5–25)
CALCIUM SERPL-MCNC: 10 MG/DL (ref 8.4–10.2)
CHLORIDE SERPL-SCNC: 100 MMOL/L (ref 96–108)
CO2 SERPL-SCNC: 28 MMOL/L (ref 21–32)
CREAT SERPL-MCNC: 1.24 MG/DL (ref 0.6–1.3)
EOSINOPHIL # BLD AUTO: 0.19 THOUSAND/ÂΜL (ref 0–0.61)
EOSINOPHIL NFR BLD AUTO: 3 % (ref 0–6)
ERYTHROCYTE [DISTWIDTH] IN BLOOD BY AUTOMATED COUNT: 12.8 % (ref 11.6–15.1)
GFR SERPL CREATININE-BSD FRML MDRD: 51 ML/MIN/1.73SQ M
GLUCOSE SERPL-MCNC: 221 MG/DL (ref 65–140)
HCT VFR BLD AUTO: 43.3 % (ref 36.5–49.3)
HGB BLD-MCNC: 13.8 G/DL (ref 12–17)
IMM GRANULOCYTES # BLD AUTO: 0.02 THOUSAND/UL (ref 0–0.2)
IMM GRANULOCYTES NFR BLD AUTO: 0 % (ref 0–2)
LYMPHOCYTES # BLD AUTO: 1.29 THOUSANDS/ÂΜL (ref 0.6–4.47)
LYMPHOCYTES NFR BLD AUTO: 19 % (ref 14–44)
MCH RBC QN AUTO: 32.6 PG (ref 26.8–34.3)
MCHC RBC AUTO-ENTMCNC: 31.9 G/DL (ref 31.4–37.4)
MCV RBC AUTO: 102 FL (ref 82–98)
MONOCYTES # BLD AUTO: 0.56 THOUSAND/ÂΜL (ref 0.17–1.22)
MONOCYTES NFR BLD AUTO: 8 % (ref 4–12)
NEUTROPHILS # BLD AUTO: 4.8 THOUSANDS/ÂΜL (ref 1.85–7.62)
NEUTS SEG NFR BLD AUTO: 69 % (ref 43–75)
NRBC BLD AUTO-RTO: 0 /100 WBCS
PLATELET # BLD AUTO: 181 THOUSANDS/UL (ref 149–390)
PMV BLD AUTO: 10.8 FL (ref 8.9–12.7)
POTASSIUM SERPL-SCNC: 4.6 MMOL/L (ref 3.5–5.3)
PROT SERPL-MCNC: 7.3 G/DL (ref 6.4–8.4)
PSA FREE MFR SERPL: 14.39 %
PSA FREE SERPL-MCNC: 1.41 NG/ML
PSA SERPL-MCNC: 9.78 NG/ML (ref 0–4)
RBC # BLD AUTO: 4.23 MILLION/UL (ref 3.88–5.62)
SODIUM SERPL-SCNC: 137 MMOL/L (ref 135–147)
WBC # BLD AUTO: 6.93 THOUSAND/UL (ref 4.31–10.16)

## 2025-06-16 PROCEDURE — 80053 COMPREHEN METABOLIC PANEL: CPT

## 2025-06-16 PROCEDURE — 85025 COMPLETE CBC W/AUTO DIFF WBC: CPT

## 2025-06-16 PROCEDURE — 84153 ASSAY OF PSA TOTAL: CPT

## 2025-06-16 PROCEDURE — 84154 ASSAY OF PSA FREE: CPT

## 2025-06-16 PROCEDURE — 36415 COLL VENOUS BLD VENIPUNCTURE: CPT

## 2025-06-24 ENCOUNTER — OFFICE VISIT (OUTPATIENT)
Dept: UROLOGY | Facility: CLINIC | Age: 89
End: 2025-06-24
Payer: COMMERCIAL

## 2025-06-24 VITALS
HEIGHT: 69 IN | SYSTOLIC BLOOD PRESSURE: 122 MMHG | HEART RATE: 65 BPM | BODY MASS INDEX: 40.73 KG/M2 | OXYGEN SATURATION: 93 % | WEIGHT: 275 LBS | DIASTOLIC BLOOD PRESSURE: 70 MMHG

## 2025-06-24 DIAGNOSIS — R97.20 ELEVATED PSA: Primary | ICD-10-CM

## 2025-06-24 PROCEDURE — 99213 OFFICE O/P EST LOW 20 MIN: CPT | Performed by: UROLOGY

## 2025-06-24 NOTE — PROGRESS NOTES
Hung Poe is a(n) 89 y.o. male. , :  1935    Subjective     Assessment:  The encounter diagnosis was Elevated PSA.  89 M here to follow-up on his elevated PSA.  Happy to report that the digital rectal exam was unremarkable.  He has no signs or symptoms of metastatic disease.  He voids well.  He is here with his wife. Wants to observe.    Plan: PSA and DARREN one year.     Radiology  2024 MRI prostate  1. PI-RADSv2.1 Category 4 - High (clinically significant cancer is likely to be present). A 1.4 cm left posterolateral peripheral zone lesion, mid gland to apex.  2. No extraprostatic tumor, seminal vesicle invasion, pelvic lymphadenopathy, or pelvic osseous metastatic disease.  3. Calculated prostate volume of 86 mL.      History  Elevated PSA with abnormal MRI left posterolateral peripheral.  Mid gland to apex.     Prior Visits  2021 [place]    85-year-old man history of BPH.  He has no significant urinary complaints.  Last year he was referred by his family doctor for elevated PSA at age 84. His random PSA was 5.2 with no previous ones to compare.  Repeat PSA was 4.2.  At that time I recommended 1 year follow-up with no further PSA testing. He has occasional 1-2 times nocturia.      2024 [place]  Patient is a 88-year-old gentleman with a history of BPH with no significant urinary tract symptoms and elevated PSA who presents to the office for follow-up.  Last seen in the office in 2021.  At this point in time his PSA was measuring 4.2 from a 5.2 previously ordered.  Had a prostate exam with no nodularity noted.  He was advised to follow-up on an as-needed basis.  3 presents to the office today due to concern for low urinary tract symptoms such as frequency, urgency, urge incontinence.  No dysuria or gross hematuria.  He also obtained a updated PSA which returned significantly elevated at 9.3 with a total free PSA percentage of 12.2%.  No known family history of prostate cancer.   "Patient has not had a biopsy before      6/25/2024 NAZANIN Carvajal  88-year-old gentleman with BPH and lower urinary tract symptoms, elevated PSA.  MRI of the prostate suggests lesion in the left posterolateral region, peripherally.     Plan:  Biopsy vs observation.  Prefers to watch.  PSA and DARREN and PSA in 6 months.  Call if urination gets worse or has bone pain to suggest that there is spread of the cancer.  I think this would be difficult for him to tell in and amongst his normal aches and pains that comes with being 88.    6/24/2025 OV Hilton Carvajal  89 M here to follow-up on his elevated PSA.  Happy to report that the digital rectal exam was unremarkable.  He has no signs or symptoms of metastatic disease.  He voids well.  He is here with his wife. Wants to observe.    Plan: PSA and DARREN one year.    Review of Systems    Lab Results   Component Value Date    PSA 9.784 (H) 06/16/2025    PSA 12.457 (H) 11/25/2024    PSA 8.29 (H) 04/19/2024     No results found for: \"TESTOSTERONE\"  No components found for: \"CR\"  No results found for: \"HBA1C\"    Objective     /70 (BP Location: Right arm, Patient Position: Sitting, Cuff Size: Adult)   Pulse 65   Ht 5' 9\" (1.753 m)   Wt 125 kg (275 lb)   SpO2 93%   BMI 40.61 kg/m²     Physical Exam      Hilton Carvajal [facility]  "

## 2025-06-27 ENCOUNTER — TELEPHONE (OUTPATIENT)
Dept: NEPHROLOGY | Facility: CLINIC | Age: 89
End: 2025-06-27

## 2025-06-27 NOTE — TELEPHONE ENCOUNTER
Spoke with Hung regarding your upcoming appointment on 10/24/2025 with Dr. Li in [place], [place]      Unfortunately, due to a change in the provider's schedule we need to change your appointment.  It has been moved to Thursday, 12/5 11 am. He will have his wife call us to confirm this change.

## 2025-08-08 ENCOUNTER — HOSPITAL ENCOUNTER (INPATIENT)
Facility: HOSPITAL | Age: 89
LOS: 7 days | Discharge: HOME | DRG: 435 | End: 2025-08-15
Attending: EMERGENCY MEDICINE | Admitting: INTERNAL MEDICINE
Payer: COMMERCIAL

## 2025-08-08 ENCOUNTER — APPOINTMENT (EMERGENCY)
Dept: CT IMAGING | Facility: HOSPITAL | Age: 89
DRG: 435 | End: 2025-08-08
Payer: COMMERCIAL

## 2025-08-08 PROBLEM — R17 JAUNDICE: Status: ACTIVE | Noted: 2025-08-08

## 2025-08-08 PROBLEM — K83.1 OBSTRUCTIVE JAUNDICE: Status: ACTIVE | Noted: 2025-08-08

## 2025-08-08 PROBLEM — R73.9 HYPERGLYCEMIA: Status: ACTIVE | Noted: 2025-08-08

## 2025-08-09 ENCOUNTER — APPOINTMENT (INPATIENT)
Dept: MRI IMAGING | Facility: HOSPITAL | Age: 89
DRG: 435 | End: 2025-08-09
Payer: COMMERCIAL

## 2025-08-09 PROBLEM — K86.89 PANCREATIC MASS: Status: ACTIVE | Noted: 2025-08-09

## 2025-08-10 PROBLEM — E08.65 DIABETES MELLITUS DUE TO UNDERLYING CONDITION WITH HYPERGLYCEMIA, WITHOUT LONG-TERM CURRENT USE OF INSULIN (HCC): Status: ACTIVE | Noted: 2025-08-08

## 2025-08-11 ENCOUNTER — APPOINTMENT (INPATIENT)
Dept: RADIOLOGY | Facility: HOSPITAL | Age: 89
DRG: 435 | End: 2025-08-11
Payer: COMMERCIAL

## 2025-08-12 ENCOUNTER — ANESTHESIA EVENT (INPATIENT)
Dept: GASTROENTEROLOGY | Facility: HOSPITAL | Age: 89
DRG: 435 | End: 2025-08-12
Payer: COMMERCIAL

## 2025-08-12 ENCOUNTER — APPOINTMENT (INPATIENT)
Dept: RADIOLOGY | Facility: HOSPITAL | Age: 89
DRG: 435 | End: 2025-08-12
Payer: COMMERCIAL

## 2025-08-12 ENCOUNTER — APPOINTMENT (INPATIENT)
Dept: GASTROENTEROLOGY | Facility: HOSPITAL | Age: 89
DRG: 435 | End: 2025-08-12
Attending: PHYSICIAN ASSISTANT
Payer: COMMERCIAL

## 2025-08-12 ENCOUNTER — ANESTHESIA (INPATIENT)
Dept: GASTROENTEROLOGY | Facility: HOSPITAL | Age: 89
DRG: 435 | End: 2025-08-12
Payer: COMMERCIAL

## 2025-08-12 PROBLEM — N18.9 ACUTE KIDNEY INJURY SUPERIMPOSED ON CKD  (HCC): Status: ACTIVE | Noted: 2025-08-12

## 2025-08-12 PROBLEM — N17.9 ACUTE KIDNEY INJURY SUPERIMPOSED ON CKD  (HCC): Status: ACTIVE | Noted: 2025-08-12

## 2025-08-12 PROBLEM — E87.1 HYPONATREMIA: Status: ACTIVE | Noted: 2025-08-12

## 2025-08-13 ENCOUNTER — APPOINTMENT (INPATIENT)
Dept: CT IMAGING | Facility: HOSPITAL | Age: 89
DRG: 435 | End: 2025-08-13
Payer: COMMERCIAL

## 2025-08-13 ENCOUNTER — APPOINTMENT (INPATIENT)
Dept: RADIOLOGY | Facility: HOSPITAL | Age: 89
DRG: 435 | End: 2025-08-13
Attending: INTERNAL MEDICINE
Payer: COMMERCIAL

## 2025-08-13 PROBLEM — Z51.5 PALLIATIVE CARE ENCOUNTER: Status: ACTIVE | Noted: 2025-08-13

## 2025-08-14 ENCOUNTER — TELEPHONE (OUTPATIENT)
Dept: HEMATOLOGY ONCOLOGY | Facility: CLINIC | Age: 89
End: 2025-08-14

## 2025-08-18 ENCOUNTER — DOCUMENTATION (OUTPATIENT)
Dept: HEMATOLOGY ONCOLOGY | Facility: CLINIC | Age: 89
End: 2025-08-18

## 2025-08-18 ENCOUNTER — TELEPHONE (OUTPATIENT)
Dept: HEMATOLOGY ONCOLOGY | Facility: CLINIC | Age: 89
End: 2025-08-18

## 2025-08-19 ENCOUNTER — TELEPHONE (OUTPATIENT)
Dept: HEMATOLOGY ONCOLOGY | Facility: CLINIC | Age: 89
End: 2025-08-19

## 2025-08-19 ENCOUNTER — APPOINTMENT (OUTPATIENT)
Dept: LAB | Facility: CLINIC | Age: 89
End: 2025-08-19
Payer: COMMERCIAL

## 2025-08-19 ENCOUNTER — OFFICE VISIT (OUTPATIENT)
Dept: HEMATOLOGY ONCOLOGY | Facility: CLINIC | Age: 89
End: 2025-08-19
Payer: COMMERCIAL

## 2025-08-19 VITALS
TEMPERATURE: 97.1 F | HEIGHT: 69 IN | OXYGEN SATURATION: 95 % | RESPIRATION RATE: 18 BRPM | BODY MASS INDEX: 40.43 KG/M2 | DIASTOLIC BLOOD PRESSURE: 68 MMHG | WEIGHT: 273 LBS | HEART RATE: 121 BPM | SYSTOLIC BLOOD PRESSURE: 132 MMHG

## 2025-08-19 DIAGNOSIS — C25.9 PANCREATIC ADENOCARCINOMA (HCC): ICD-10-CM

## 2025-08-19 DIAGNOSIS — R16.0 LIVER MASSES: ICD-10-CM

## 2025-08-19 DIAGNOSIS — K86.89 PANCREATIC MASS: Primary | ICD-10-CM

## 2025-08-19 DIAGNOSIS — Z95.828 PORT-A-CATH IN PLACE: Primary | ICD-10-CM

## 2025-08-19 DIAGNOSIS — K86.89 PANCREATIC MASS: ICD-10-CM

## 2025-08-19 DIAGNOSIS — R11.0 NAUSEA: ICD-10-CM

## 2025-08-19 DIAGNOSIS — E66.813 CLASS 3 SEVERE OBESITY DUE TO EXCESS CALORIES WITHOUT SERIOUS COMORBIDITY WITH BODY MASS INDEX (BMI) OF 40.0 TO 44.9 IN ADULT: ICD-10-CM

## 2025-08-19 LAB — MISCELLANEOUS LAB TEST RESULT: NORMAL

## 2025-08-19 PROCEDURE — G2211 COMPLEX E/M VISIT ADD ON: HCPCS | Performed by: INTERNAL MEDICINE

## 2025-08-19 PROCEDURE — 36415 COLL VENOUS BLD VENIPUNCTURE: CPT

## 2025-08-19 PROCEDURE — 99215 OFFICE O/P EST HI 40 MIN: CPT | Performed by: INTERNAL MEDICINE

## 2025-08-19 RX ORDER — SODIUM CHLORIDE 9 MG/ML
20 INJECTION, SOLUTION INTRAVENOUS ONCE
Status: CANCELLED | OUTPATIENT
Start: 2025-08-27

## 2025-08-19 RX ORDER — LIDOCAINE AND PRILOCAINE 25; 25 MG/G; MG/G
CREAM TOPICAL AS NEEDED
Qty: 100 G | Refills: 0 | Status: SHIPPED | OUTPATIENT
Start: 2025-08-19

## 2025-08-19 RX ORDER — ONDANSETRON 8 MG/1
8 TABLET, FILM COATED ORAL EVERY 8 HOURS PRN
Qty: 20 TABLET | Refills: 1 | Status: SHIPPED | OUTPATIENT
Start: 2025-08-19

## 2025-08-19 RX ORDER — SODIUM CHLORIDE 9 MG/ML
20 INJECTION, SOLUTION INTRAVENOUS ONCE
OUTPATIENT
Start: 2025-09-10

## 2025-08-20 ENCOUNTER — TELEPHONE (OUTPATIENT)
Dept: NUTRITION | Facility: CLINIC | Age: 89
End: 2025-08-20

## 2025-08-21 ENCOUNTER — TELEPHONE (OUTPATIENT)
Age: 89
End: 2025-08-21

## (undated) DEVICE — ACE WRAP 6 IN STERILE

## (undated) DEVICE — SYRINGE 20ML LL

## (undated) DEVICE — TRAY FOLEY 16FR URIMETER SURESTEP

## (undated) DEVICE — ABDOMINAL PAD: Brand: DERMACEA

## (undated) DEVICE — ACE WRAP 6 IN UNSTERILE

## (undated) DEVICE — DUAL CUT SAGITTAL BLADE

## (undated) DEVICE — SPONGE PVP SCRUB WING STERILE

## (undated) DEVICE — REM POLYHESIVE ADULT PATIENT RETURN ELECTRODE: Brand: VALLEYLAB

## (undated) DEVICE — CULTURE TUBE ANAEROBIC

## (undated) DEVICE — HOOD: Brand: FLYTE, SURGICOOL

## (undated) DEVICE — SPECIMEN CONTAINER STERILE PEEL PACK

## (undated) DEVICE — DRAPE EQUIPMENT RF WAND

## (undated) DEVICE — CULTURE TUBE AEROBIC

## (undated) DEVICE — SUT VICRYL PLUS 1 CTB-1 36 IN VCPB947H

## (undated) DEVICE — GLOVE SRG BIOGEL 8

## (undated) DEVICE — THE SIMPULSE SOLO SYSTEM WITH ULTREX RETRACTABLE SPLASH SHIELD TIP: Brand: SIMPULSE SOLO

## (undated) DEVICE — 3M™ IOBAN™ 2 ANTIMICROBIAL INCISE DRAPE 6650EZ: Brand: IOBAN™ 2

## (undated) DEVICE — DRESSING MEPILEX AG BORDER 4 X 4 IN

## (undated) DEVICE — SPONGE GAUZE 4 X 9

## (undated) DEVICE — STOCKINETTE REGULAR

## (undated) DEVICE — SUT VICRYL PLUS 2-0 CTB-1 27 IN VCPB259H

## (undated) DEVICE — DRAPE TOWEL: Brand: CONVERTORS

## (undated) DEVICE — GLOVE INDICATOR PI UNDERGLOVE SZ 8.5 BLUE

## (undated) DEVICE — PADDING CAST 4 IN  COTTON STRL

## (undated) DEVICE — PLUMEPEN PRO 10FT

## (undated) DEVICE — NO-SCRATCH ™ SMALL WHITNEY CURETTE ™ IS A SINGLE-USE, PLASTIC CURETTE FOR QUICKLY APPLYING, MANIPULATING AND REMOVING BONE CEMENT DURING HIP AND KNEE REPLACEMENT SURGERY. THE PLASTIC IS SOFTER THAN STEEL INSTRUMENTS, REDUCING THE RISK OF DAMAGING THE PROSTHESIS WITH METAL INSTRUMENTS.  THE CURETTE’S 6MM TIP REMOVES EXCESS CEMENT FROM REPLACEMENT HIPS AND KNEES. EASY-TO-MANEUVER, THE SMALL BLUE CURETTE LETS YOU REMOVE CEMENT FROM ALL EDGES OF THE PROSTHESIS.NO-SCRATCH WHITNEY SMALL CURETTE FEATURES:SAFER THAN STEEL- MADE OF PLASTIC - STURDY YET SOFTER THAN SURGICAL STEEL.HANDIER- EACH TOOL HAS A MOLDED-IN THUMB INDENTATION INSTANTLY ORIENTING THE TOOL.- EASIER TO MANEUVER IN HARD TO SEE PLACES.- COLOR-CODED FOR EASY IDENTIFICATION.FASTER- COMES INDIVIDUALLY PACKAGED IN STERILE, PEEL OPEN POUCH, READY TO GO.- APPLIES, MANIPULATES, OR REMOVES CEMENT WITH FINGERTIP PRECISION.ECONOMICAL- THE COST OF A SINGLE REVISION DWARFS THE COST OF A SINGLE-USE CURETTE. - DISPOSABLE – THERE’S NO NEED TO WASTE TIME REMOVING HARDENED CEMENT OR RE-STERILIZING TOOLS.- LESS EXPENSIVE TO BUY AND INVENTORY - ORDER ONLY THE TOOL YOU USE.- PACKAGED 25 INDIVIDUALLY WRAPPED TOOLS TO A CARTON FOR CONVENIENT SHELF STORAGE.: Brand: WHITNEY NO-SCRATCH CURETTE (SMALL)

## (undated) DEVICE — PACK MAJOR ORTHO W/SPLITS PBDS

## (undated) DEVICE — COOL TEMP PAD

## (undated) DEVICE — GAUZE SPONGES,16 PLY: Brand: CURITY

## (undated) DEVICE — SPINNING CEMENT MIXING BOWL

## (undated) DEVICE — CHLORAPREP HI-LITE 26ML ORANGE

## (undated) DEVICE — CAPIT KNEE ATTUNE FB CEMENT - DEPUY

## (undated) DEVICE — RECIPROCATING BLADE, DOUBLE SIDED, OFFSET  (70.0 X 0.64 X 12.6MM)

## (undated) DEVICE — BETHLEHEM UNIV TOTAL KNEE, KIT: Brand: CARDINAL HEALTH

## (undated) DEVICE — DRAPE SHEET X-LG

## (undated) DEVICE — INTENDED FOR TISSUE SEPARATION, AND OTHER PROCEDURES THAT REQUIRE A SHARP SURGICAL BLADE TO PUNCTURE OR CUT.: Brand: BARD-PARKER SAFETY BLADES SIZE 10, STERILE

## (undated) DEVICE — CUFF TOURNIQUET 30 X 4 IN QUICK CONNECT DISP 1BLA

## (undated) DEVICE — SILVER-COATED ANTIMICROBIAL BARRIER DRESSING: Brand: ACTICOAT   4" X 8"

## (undated) DEVICE — JP 3-SPRING RES W/10FR PVC DRAIN/TR: Brand: CARDINAL HEALTH

## (undated) DEVICE — RECIPROCATING BLADE, DOUBLE SIDED (70.0 X 0.96 X 12.5MM)